# Patient Record
Sex: MALE | Race: WHITE | Employment: UNEMPLOYED | URBAN - METROPOLITAN AREA
[De-identification: names, ages, dates, MRNs, and addresses within clinical notes are randomized per-mention and may not be internally consistent; named-entity substitution may affect disease eponyms.]

---

## 2016-04-21 LAB
LEFT VENTRICULAR EJECTION FRACTION MODE: NORMAL
LV EF: 65 %

## 2017-02-23 PROBLEM — N18.30 CKD (CHRONIC KIDNEY DISEASE) STAGE 3, GFR 30-59 ML/MIN (HCC): Chronic | Status: ACTIVE | Noted: 2017-02-23

## 2017-06-01 PROBLEM — R76.8 POSITIVE HEPATITIS C ANTIBODY TEST: Chronic | Status: ACTIVE | Noted: 2017-06-01

## 2018-01-29 PROBLEM — N40.0 BPH (BENIGN PROSTATIC HYPERPLASIA): Status: ACTIVE | Noted: 2018-01-29

## 2018-02-26 PROBLEM — F32.A DEPRESSION: Status: ACTIVE | Noted: 2018-02-26

## 2018-03-12 ENCOUNTER — HOSPITAL ENCOUNTER (OUTPATIENT)
Dept: ULTRASOUND IMAGING | Age: 58
Discharge: HOME | End: 2018-03-14
Payer: MEDICARE

## 2018-03-12 DIAGNOSIS — N18.30 CKD (CHRONIC KIDNEY DISEASE) STAGE 3, GFR 30-59 ML/MIN (HCC): Chronic | ICD-10-CM

## 2018-03-12 DIAGNOSIS — E87.5 HYPERKALEMIA: ICD-10-CM

## 2018-03-12 PROCEDURE — 76770 US EXAM ABDO BACK WALL COMP: CPT

## 2018-04-02 ENCOUNTER — OFFICE VISIT (OUTPATIENT)
Dept: FAMILY MEDICINE CLINIC | Age: 58
End: 2018-04-02
Payer: MEDICARE

## 2018-04-02 ENCOUNTER — HOSPITAL ENCOUNTER (OUTPATIENT)
Age: 58
Discharge: HOME | End: 2018-04-04
Payer: MEDICARE

## 2018-04-02 VITALS
RESPIRATION RATE: 18 BRPM | HEIGHT: 72 IN | HEART RATE: 76 BPM | DIASTOLIC BLOOD PRESSURE: 79 MMHG | TEMPERATURE: 97.6 F | OXYGEN SATURATION: 93 % | SYSTOLIC BLOOD PRESSURE: 133 MMHG | BODY MASS INDEX: 42.66 KG/M2 | WEIGHT: 315 LBS

## 2018-04-02 DIAGNOSIS — E66.9 DIABETES MELLITUS TYPE 2 IN OBESE (HCC): ICD-10-CM

## 2018-04-02 DIAGNOSIS — Z76.0 MEDICATION REFILL: ICD-10-CM

## 2018-04-02 DIAGNOSIS — E13.65 OTHER SPECIFIED DIABETES MELLITUS WITH HYPERGLYCEMIA, WITHOUT LONG-TERM CURRENT USE OF INSULIN (HCC): ICD-10-CM

## 2018-04-02 DIAGNOSIS — I10 ESSENTIAL HYPERTENSION: Chronic | ICD-10-CM

## 2018-04-02 DIAGNOSIS — E11.69 DIABETES MELLITUS TYPE 2 IN OBESE (HCC): ICD-10-CM

## 2018-04-02 DIAGNOSIS — F32.A DEPRESSION, UNSPECIFIED DEPRESSION TYPE: ICD-10-CM

## 2018-04-02 DIAGNOSIS — I10 ESSENTIAL HYPERTENSION: Primary | Chronic | ICD-10-CM

## 2018-04-02 LAB
ANION GAP SERPL CALCULATED.3IONS-SCNC: 18 MMOL/L (ref 7–16)
BUN BLDV-MCNC: 22 MG/DL (ref 6–20)
CALCIUM SERPL-MCNC: 9 MG/DL (ref 8.6–10.2)
CHLORIDE BLD-SCNC: 95 MMOL/L (ref 98–107)
CO2: 26 MMOL/L (ref 22–29)
CREAT SERPL-MCNC: 1.3 MG/DL (ref 0.7–1.2)
GFR AFRICAN AMERICAN: >60
GFR NON-AFRICAN AMERICAN: 57 ML/MIN/1.73
GLUCOSE BLD-MCNC: 349 MG/DL (ref 74–109)
HBA1C MFR BLD: 8.8 % (ref 4.8–5.9)
POTASSIUM SERPL-SCNC: 4.4 MMOL/L (ref 3.5–5)
SODIUM BLD-SCNC: 139 MMOL/L (ref 132–146)

## 2018-04-02 PROCEDURE — 80048 BASIC METABOLIC PNL TOTAL CA: CPT

## 2018-04-02 PROCEDURE — 36415 COLL VENOUS BLD VENIPUNCTURE: CPT

## 2018-04-02 PROCEDURE — 99212 OFFICE O/P EST SF 10 MIN: CPT | Performed by: FAMILY MEDICINE

## 2018-04-02 PROCEDURE — 99213 OFFICE O/P EST LOW 20 MIN: CPT | Performed by: FAMILY MEDICINE

## 2018-04-02 PROCEDURE — 83036 HEMOGLOBIN GLYCOSYLATED A1C: CPT

## 2018-04-02 RX ORDER — LOSARTAN POTASSIUM 25 MG/1
25 TABLET ORAL DAILY
Qty: 30 TABLET | Refills: 3 | Status: SHIPPED | OUTPATIENT
Start: 2018-04-02 | End: 2018-08-07 | Stop reason: SDUPTHER

## 2018-04-02 RX ORDER — TRAZODONE HYDROCHLORIDE 100 MG/1
200 TABLET ORAL NIGHTLY
Qty: 60 TABLET | Refills: 5 | Status: SHIPPED | OUTPATIENT
Start: 2018-04-02 | End: 2018-06-13 | Stop reason: SDUPTHER

## 2018-04-02 RX ORDER — PAROXETINE HYDROCHLORIDE 20 MG/1
20 TABLET, FILM COATED ORAL DAILY
Qty: 30 TABLET | Refills: 3 | Status: SHIPPED | OUTPATIENT
Start: 2018-04-02 | End: 2018-08-07 | Stop reason: SDUPTHER

## 2018-04-02 ASSESSMENT — ENCOUNTER SYMPTOMS
NAUSEA: 0
CONSTIPATION: 0
VOMITING: 0
DIARRHEA: 0
ABDOMINAL PAIN: 0
SHORTNESS OF BREATH: 0
COUGH: 0

## 2018-04-05 ENCOUNTER — TELEPHONE (OUTPATIENT)
Dept: CASE MANAGEMENT | Age: 58
End: 2018-04-05

## 2018-05-01 ENCOUNTER — TELEPHONE (OUTPATIENT)
Dept: FAMILY MEDICINE CLINIC | Age: 58
End: 2018-05-01

## 2018-05-23 RX ORDER — GLIPIZIDE 10 MG/1
10 TABLET ORAL
Qty: 60 TABLET | Refills: 3 | Status: SHIPPED | OUTPATIENT
Start: 2018-05-23 | End: 2018-08-07 | Stop reason: SDUPTHER

## 2018-05-25 ENCOUNTER — HOSPITAL ENCOUNTER (OUTPATIENT)
Dept: CT IMAGING | Age: 58
Discharge: HOME | End: 2018-05-27
Payer: MEDICARE

## 2018-05-25 DIAGNOSIS — Z87.891 PERSONAL HISTORY OF TOBACCO USE: ICD-10-CM

## 2018-05-25 PROCEDURE — G0297 LDCT FOR LUNG CA SCREEN: HCPCS

## 2018-05-29 ENCOUNTER — TELEPHONE (OUTPATIENT)
Dept: CASE MANAGEMENT | Age: 58
End: 2018-05-29

## 2018-05-31 DIAGNOSIS — E66.9 DIABETES MELLITUS TYPE 2 IN OBESE (HCC): ICD-10-CM

## 2018-05-31 DIAGNOSIS — I25.2 HISTORY OF NON-ST ELEVATION MYOCARDIAL INFARCTION (NSTEMI): ICD-10-CM

## 2018-05-31 DIAGNOSIS — Z76.0 MEDICATION REFILL: ICD-10-CM

## 2018-05-31 DIAGNOSIS — E11.69 DIABETES MELLITUS TYPE 2 IN OBESE (HCC): ICD-10-CM

## 2018-05-31 DIAGNOSIS — I10 ESSENTIAL HYPERTENSION: ICD-10-CM

## 2018-05-31 DIAGNOSIS — E78.00 PURE HYPERCHOLESTEROLEMIA: Chronic | ICD-10-CM

## 2018-06-01 RX ORDER — ATENOLOL 50 MG/1
50 TABLET ORAL 2 TIMES DAILY
Qty: 180 TABLET | Refills: 1 | Status: SHIPPED | OUTPATIENT
Start: 2018-06-01 | End: 2018-11-12 | Stop reason: SDUPTHER

## 2018-06-01 RX ORDER — ATORVASTATIN CALCIUM 40 MG/1
TABLET, FILM COATED ORAL
Qty: 90 TABLET | Refills: 1 | Status: SHIPPED | OUTPATIENT
Start: 2018-06-01 | End: 2018-11-19 | Stop reason: SDUPTHER

## 2018-06-01 RX ORDER — HYDROCHLOROTHIAZIDE 25 MG/1
25 TABLET ORAL DAILY
Qty: 90 TABLET | Refills: 1 | Status: SHIPPED | OUTPATIENT
Start: 2018-06-01 | End: 2018-11-19 | Stop reason: SDUPTHER

## 2018-06-01 RX ORDER — FINASTERIDE 5 MG/1
5 TABLET, FILM COATED ORAL DAILY
Qty: 90 TABLET | Refills: 1 | Status: SHIPPED | OUTPATIENT
Start: 2018-06-01 | End: 2018-12-05 | Stop reason: SDUPTHER

## 2018-06-01 RX ORDER — ASPIRIN 81 MG/1
81 TABLET ORAL DAILY
Qty: 90 TABLET | Refills: 1 | Status: SHIPPED | OUTPATIENT
Start: 2018-06-01 | End: 2018-11-19 | Stop reason: SDUPTHER

## 2018-06-01 RX ORDER — AMLODIPINE BESYLATE 10 MG/1
10 TABLET ORAL DAILY
Qty: 90 TABLET | Refills: 1 | Status: SHIPPED | OUTPATIENT
Start: 2018-06-01 | End: 2018-11-12 | Stop reason: SDUPTHER

## 2018-06-01 RX ORDER — OMEPRAZOLE 20 MG/1
20 CAPSULE, DELAYED RELEASE ORAL DAILY
Qty: 90 CAPSULE | Refills: 1 | Status: SHIPPED | OUTPATIENT
Start: 2018-06-01 | End: 2018-11-19 | Stop reason: SDUPTHER

## 2018-06-13 DIAGNOSIS — Z76.0 MEDICATION REFILL: ICD-10-CM

## 2018-06-14 RX ORDER — DIPHENHYDRAMINE HCL 25 MG
75 CAPSULE ORAL NIGHTLY
Qty: 90 CAPSULE | Refills: 3 | Status: SHIPPED | OUTPATIENT
Start: 2018-06-14 | End: 2018-11-19 | Stop reason: SDUPTHER

## 2018-06-14 RX ORDER — TRAZODONE HYDROCHLORIDE 100 MG/1
200 TABLET ORAL NIGHTLY
Qty: 60 TABLET | Refills: 5 | Status: SHIPPED | OUTPATIENT
Start: 2018-06-14 | End: 2018-08-07 | Stop reason: SDUPTHER

## 2018-08-07 ENCOUNTER — OFFICE VISIT (OUTPATIENT)
Dept: FAMILY MEDICINE CLINIC | Age: 58
End: 2018-08-07
Payer: MEDICARE

## 2018-08-07 VITALS
HEART RATE: 81 BPM | RESPIRATION RATE: 18 BRPM | TEMPERATURE: 98 F | HEIGHT: 72 IN | SYSTOLIC BLOOD PRESSURE: 133 MMHG | WEIGHT: 315 LBS | BODY MASS INDEX: 42.66 KG/M2 | OXYGEN SATURATION: 92 % | DIASTOLIC BLOOD PRESSURE: 87 MMHG

## 2018-08-07 DIAGNOSIS — E66.9 DIABETES MELLITUS TYPE 2 IN OBESE (HCC): ICD-10-CM

## 2018-08-07 DIAGNOSIS — E11.69 DIABETES MELLITUS TYPE 2 IN OBESE (HCC): ICD-10-CM

## 2018-08-07 DIAGNOSIS — G56.00 CARPAL TUNNEL SYNDROME, UNSPECIFIED LATERALITY: Primary | ICD-10-CM

## 2018-08-07 DIAGNOSIS — F32.A DEPRESSION, UNSPECIFIED DEPRESSION TYPE: ICD-10-CM

## 2018-08-07 DIAGNOSIS — I10 ESSENTIAL HYPERTENSION: Chronic | ICD-10-CM

## 2018-08-07 DIAGNOSIS — Z76.0 MEDICATION REFILL: ICD-10-CM

## 2018-08-07 LAB — HBA1C MFR BLD: 9.9 %

## 2018-08-07 PROCEDURE — 83036 HEMOGLOBIN GLYCOSYLATED A1C: CPT | Performed by: STUDENT IN AN ORGANIZED HEALTH CARE EDUCATION/TRAINING PROGRAM

## 2018-08-07 PROCEDURE — 99212 OFFICE O/P EST SF 10 MIN: CPT | Performed by: STUDENT IN AN ORGANIZED HEALTH CARE EDUCATION/TRAINING PROGRAM

## 2018-08-07 PROCEDURE — 99213 OFFICE O/P EST LOW 20 MIN: CPT | Performed by: STUDENT IN AN ORGANIZED HEALTH CARE EDUCATION/TRAINING PROGRAM

## 2018-08-07 RX ORDER — LOSARTAN POTASSIUM 25 MG/1
25 TABLET ORAL DAILY
Qty: 30 TABLET | Refills: 3 | Status: SHIPPED | OUTPATIENT
Start: 2018-08-07 | End: 2018-11-19 | Stop reason: SDUPTHER

## 2018-08-07 RX ORDER — TRAZODONE HYDROCHLORIDE 100 MG/1
200 TABLET ORAL NIGHTLY
Qty: 60 TABLET | Refills: 5 | Status: SHIPPED | OUTPATIENT
Start: 2018-08-07 | End: 2019-01-03 | Stop reason: SDUPTHER

## 2018-08-07 RX ORDER — GLIPIZIDE 10 MG/1
10 TABLET ORAL
Qty: 90 TABLET | Refills: 3 | Status: SHIPPED | OUTPATIENT
Start: 2018-08-07 | End: 2018-11-19 | Stop reason: SDUPTHER

## 2018-08-07 RX ORDER — MIRTAZAPINE 15 MG/1
15 TABLET, FILM COATED ORAL NIGHTLY
Qty: 30 TABLET | Refills: 3 | Status: SHIPPED | OUTPATIENT
Start: 2018-08-07 | End: 2018-11-12 | Stop reason: SDUPTHER

## 2018-08-07 RX ORDER — PAROXETINE HYDROCHLORIDE 20 MG/1
20 TABLET, FILM COATED ORAL DAILY
Qty: 30 TABLET | Refills: 3 | Status: SHIPPED | OUTPATIENT
Start: 2018-08-07 | End: 2018-11-19 | Stop reason: SDUPTHER

## 2018-08-07 ASSESSMENT — ENCOUNTER SYMPTOMS
ABDOMINAL PAIN: 0
NAUSEA: 0
VOMITING: 0

## 2018-08-07 NOTE — PROGRESS NOTES
work.  States that he is also taking Benadryl occasionally. Patient with COPD currently on 4-6 mLs of O2. Currently well controlled, and recently had medications changed by pulmonology which includes stopping Qvar and starting trelegy. He uses BiPAP at night for sleep apnea. [place] [facility] [place] carpal tunnel and back pain, as it would be free. Past Medical History:      Diagnosis Date    Anxiety     Asthma     CAD (coronary artery disease) 2008    2 stents in 2008 .  CHF [facility]congestive heart failure) ([facility])     Chills     Chronic sinusitis     Cocaine abuse     COPD (chronic obstructive pulmonary disease) ([facility])     Depression     Diabetes mellitus ([facility])     on insulin    GERD (gastroesophageal reflux disease)     H/O cardiovascular stress test     H/O chest x-ray     Hand fracture 3/2010    Fractured both hands.  Hepatitis C     Work up was negative. Saw specialist     History of tobacco abuse     Hyperlipidemia     Hypertension     Night sweats     NSTEMI (non-ST elevation myocardial infarction) ([facility] [address] 3/2010    Hospitalized.  Obesity     Oxygen dependent     Panic attacks     Pneumonia 3/2010     LLL.  Sleep apnea     Has CPAP machine    SOB (shortness of breath)     does not know settings       Past Surgical History:        Procedure Laterality Date    COLONOSCOPY  2011    COLONOSCOPY  9/23/15   [employer] [facility] CATH LAB PROCEDURE  2/25/2009    [facility], cardiac cath/primary BM stent in proximal LAD.     OTHER SURGICAL HISTORY      anal fistula    TONSILLECTOMY         Allergies:    Amoxicillin and Penicillins    Social History:   Social History     Social History    Marital status: Single     Spouse name: N/A    Number of children: N/A    Years of education: 12     Occupational History    disabled-      Social History Main Topics    Smoking status: Former Smoker     Packs/day: 2.00     Years: 38.00 with Dr. Jasmyn Lozada

## 2018-08-07 NOTE — PROGRESS NOTES
S: 62 y.o. male here for md follow up. Eating a lot of sugary foods. Cannot afford test strips. Trouble with sleeping. Taking trazodone at night and self increased to 300 mg. Uses cpap for ALEXIS.  O: VS: /87   Pulse 81   Temp 98 °F (36.7 °C)   Resp 18   Ht 6' (1.829 m)   Wt (!) 372 lb (168.7 kg)   SpO2 92%   BMI 50.45 kg/m²    General: NAD   CV:  RRR, no gallops, rubs, or murmurs   Resp: CTAB no R/R/W   Abd:  Soft, nontender, no masses    Ext:  no C/C/tr edema b/l legs  Impression/Plan:   1. Dm 2-check a1c. Last one was 8.8. Did see eye doctor recently. Increase glypizide  2. HTN-controlled  3. Insomnia- continue trazodone 200mg , sleep hygiene, discuss adjusting mdd meds next time, low dose remeron, Watch for signs of elevated serotonin    rto in 4 weeks      Attending Physician Statement  I have discussed the case, including pertinent history and exam findings with the resident. I agree with the documented assessment and plan.         Bety Danielle MD

## 2018-08-08 RX ORDER — LIDOCAINE 50 MG/G
1 PATCH TOPICAL DAILY
Qty: 30 PATCH | Refills: 1 | Status: SHIPPED | OUTPATIENT
Start: 2018-08-08 | End: 2018-10-02 | Stop reason: SDUPTHER

## 2018-08-22 ENCOUNTER — TELEPHONE (OUTPATIENT)
Dept: FAMILY MEDICINE CLINIC | Age: 58
End: 2018-08-22

## 2018-08-22 NOTE — TELEPHONE ENCOUNTER
Patient called in stating that he would like a letter stating that he is unable to do jury duty due to being on oxygen 24 hours a day. Letter can be faxed to  [phone].

## 2018-08-27 NOTE — TELEPHONE ENCOUNTER
Patient notified of appointment information per Dr. Janie Yao note, patient verbalized and understood.

## 2018-09-18 ENCOUNTER — OFFICE VISIT (OUTPATIENT)
Dept: FAMILY MEDICINE CLINIC | Age: 58
End: 2018-09-18
Payer: MEDICARE

## 2018-09-18 VITALS
BODY MASS INDEX: 42.66 KG/M2 | TEMPERATURE: 98.6 F | HEIGHT: 72 IN | WEIGHT: 315 LBS | OXYGEN SATURATION: 95 % | DIASTOLIC BLOOD PRESSURE: 78 MMHG | SYSTOLIC BLOOD PRESSURE: 138 MMHG | HEART RATE: 83 BPM | RESPIRATION RATE: 22 BRPM

## 2018-09-18 DIAGNOSIS — E11.69 DIABETES MELLITUS TYPE 2 IN OBESE (HCC): ICD-10-CM

## 2018-09-18 DIAGNOSIS — G47.00 INSOMNIA, UNSPECIFIED TYPE: Primary | ICD-10-CM

## 2018-09-18 DIAGNOSIS — R35.0 URINARY FREQUENCY: ICD-10-CM

## 2018-09-18 DIAGNOSIS — E66.9 DIABETES MELLITUS TYPE 2 IN OBESE (HCC): ICD-10-CM

## 2018-09-18 PROCEDURE — 99212 OFFICE O/P EST SF 10 MIN: CPT | Performed by: STUDENT IN AN ORGANIZED HEALTH CARE EDUCATION/TRAINING PROGRAM

## 2018-09-18 PROCEDURE — 99213 OFFICE O/P EST LOW 20 MIN: CPT | Performed by: STUDENT IN AN ORGANIZED HEALTH CARE EDUCATION/TRAINING PROGRAM

## 2018-09-18 NOTE — PROGRESS NOTES
[address] [facility]  Department of Family Medicine  Family Medicine Residency Program      Patient:  Benjamin Baird 62 y.o. male     Date of Service: 9/18/18      Chief complaint:   Chief Complaint   Patient presents with    Diabetes     follow-up    Insomnia    Other     Increased urination         History of Present Illness   The patient is a 62 y.o. male  presented to the clinic with PMHx of GERD, Insomnia, anxiety, and COPD who presents for f/u of Insomnia, and complaint of urinary frequency. Insomnia slightly better. We added Remeron during his last visit to the clinic, and is helping. Still taking 2- 3 trazodones, 4 benadryls, in addition to remeron. Wakes up in the middle of the nght and takes another trazodone to fall back asleep States that he is not taking anymore [employer] sleep aid. Has tried Melatonin in the past but did not work. States that xanax, and Nora Valverde helped in the past. Which he was getting from psychiatry, but he cant afford to see psychiatry anymore as it costs him $40 to see a specialist.  Robin Murdock Dr,. Sridevi Thomas- 40$ in [place]ax will want to see also on magi    Also on Paxil, and states that he is ok nnow but tends to get more down during the winter. Urinary frequency:-He follows with urologist, and is on flomax, and finasteride. Last PSA was normal. Increased urinating is  only at night. Drinking lots of water. During the. Takes HCTZ in the morning. States that he urinates  before going to bed. Due to the urinary frequency at night it makes his sleeping worse. Denies dysuria, change in urine color, or change in smell. Cardiologist-  Minus Farida Gonzalez smoking 10 years ago. Has COPD, and ALEXIS. Past Medical History:      Diagnosis Date    Anxiety     Asthma     CAD (coronary artery disease) 2008    2 stents in 2008 .     [facility]     Chills     Chronic sinusitis     Cocaine abuse     COPD [facility]ch[facility] obstructive pulmonary disease) ([facility] [address]     Depression     [facility]     on insulin    GERD (gastroesophageal reflux disease)     H/O cardiovascular stress test     H/O chest x-ray     Hand fracture 3/2010    Fractured both hands.  Hepatitis C     Work up was negative. Saw specialist     History of tobacco abuse     Hyperlipidemia     Hypertension     Night sweats     [facility]STEM[facility]non-ST elevation [facility]) [facility] 3[facility]2010    Hospitalized.  Obesity     Oxygen dependent     Panic attacks     Pneumonia 3/2010     LLL.  Sleep apnea     Has CPAP machine    SOB (shortness of breath)     does not know settings       Past Surgical History:        Procedure Laterality Date    COLONOSCOPY  2011    COLONOSCOPY  9/23/15   [facility] DI[facility]NO[facility]IC C[facility]AC CATH LAB PROCEDURE  2/25/2009    [facility], cardiac cath/primary BM stent in proximal LAD.  OTHER SURGICAL HISTORY      anal fistula    TONSILLECTOMY         Allergies:    Amoxicillin and Penicillins    Social History:   Social History     Social History    Marital status: Single     Spouse name: N/A    Number of children: N/A    Years of education: 15     Occupational History    disabled-      Social History Main Topics    Smoking status: Former Smoker     Packs/day: 2.00     Years: 38.00     Types: Cigarettes     Quit date: 8/5/2008    Smokeless tobacco: Never Used    Alcohol use No      Comment: was heavy drinker; quit age 35;  drinks 1-2 cups of coffee daily    Drug use: No      Comment: Acid, marijuana, cocaine, STOPPED ALL DRUGS 1993    Sexual activity: Not on file     Other Topics Concern    Not on file     Social History Narrative    No narrative on file        Family History:   Family History   Problem Relation Age of Onset    Heart Failure Father     Diabetes Mother     Heart Surgery Brother        Review of Systems:   Review of Systems   Constitutional: Negative for fever.    Respiratory: Negative for shortness of breath. On 4L O2   Cardiovascular: Negative for chest pain. Genitourinary:        As stated in HPI   Psychiatric/Behavioral:        As stated in HPI       Physical Exam   Vitals: /78   Pulse 83   Temp 98.6 °F (37 °C) (Oral)   Resp 22   Ht 6' (1.829 m)   Wt (!) 370 lb (167.8 kg)   SpO2 95% Comment: 4 l O2  BMI 50.18 kg/m²   General Appearance: Well developed, awake, alert, oriented, no acute distress. Obese. HEENT: Normocephalic, atraumatic. Chest wall/Lung: Clear to auscultation bilaterally,  respirations unlabored. No ronchi/wheezing/rales. On 4L O2  Heart: Regular rate and rhythm, S1 and S2 normal, no murmur, rub or gallop. Abdomen: Soft, non-tender, bowel sounds normoactive, no masses, no organomegaly  Extremities:  Trace edema on lower left leg (chronic, and better than usual)  Neurologic:   Alert&Oriented. Psychiatric: has a normal mood and affect. Behavior is normal.       Assessment and Plan       1. Diabetes mellitus type 2 in obese (HCC)  -Last A1c was 8.8. Currently on Metformin maxed out, and Glipizide TID.  - Offered patient Trulicity or sitaglipitin but he is unable to afford them as they would cost about $40, so will talk to SW    Talk to  for Psych, and DM meds-Trulicity or sitagliptin    2. Insomnia, unspecified type  -Still having trouble staying asleep. He continues to take Benadryl, Trazodone, and now the remeron I prescribed which he thinks is helping.  -Discussed sleep hygiene again. We can doxepin in the future. Advised to decrease Benadryl, and to only take 200mg of Trazodone  -Control DM which will decrease urinary frequency, which will decrease night time awakenings. -Xanax and Ambien helped in the past, but he can't afford to see Psychiatry.  -Will talk to SW to see if able to help him co-pay for Psychiatrist  -Use CPAP for ALEXIS    3.  Urinary frequency  -Increased urinary frequency at night,which is likely due to uncontrolled diabetes.  -Plan as stated above In the diabetes section      Additional plans and future considerations weight loss    Return to Office: No Follow-up on file. Medication List:    Current Outpatient Prescriptions   Medication Sig Dispense Refill    lidocaine (LIDODERM) 5 % Place 1 patch onto the skin daily 12 hours on, 12 hours off.  30 patch 1    PARoxetine (PAXIL) 20 MG tablet Take 1 tablet by mouth daily 30 tablet 3    metFORMIN (GLUCOPHAGE) 1000 MG tablet Take 1 tablet by mouth 2 times daily (with meals) 180 tablet 1    losartan (COZAAR) 25 MG tablet Take 1 tablet by mouth daily 30 tablet 3    glipiZIDE (GLUCOTROL) 10 MG tablet Take 1 tablet by mouth 3 times daily (before meals) (Patient taking differently: Take 10 mg by mouth 2 times daily (before meals) ) 90 tablet 3    mirtazapine (REMERON) 15 MG tablet Take 1 tablet by mouth nightly 30 tablet 3    traZODone (DESYREL) 100 MG tablet Take 2 tablets by mouth nightly 60 tablet 5    tamsulosin (FLOMAX) 0.4 MG capsule take 1 capsule by mouth once daily 90 capsule 1    diphenhydrAMINE (BENADRYL) 25 MG capsule Take 3 capsules by mouth nightly 90 capsule 3    Fluticasone-Umeclidin-Vilant 100-62.5-25 MCG/INH AEPB Inhale 1 puff into the lungs daily 1 each 5    amLODIPine (NORVASC) 10 MG tablet Take 1 tablet by mouth daily 90 tablet 1    atenolol (TENORMIN) 50 MG tablet Take 1 tablet by mouth 2 times daily 180 tablet 1    aspirin 81 MG EC tablet Take 1 tablet by mouth daily 90 tablet 1    omeprazole (PRILOSEC) 20 MG delayed release capsule Take 1 capsule by mouth daily 90 capsule 1    atorvastatin (LIPITOR) 40 MG tablet take 1 tablet by mouth once daily 90 tablet 1    finasteride (PROSCAR) 5 MG tablet Take 1 tablet by mouth daily 90 tablet 1    albuterol (PROVENTIL) (2.5 MG/3ML) 0.083% nebulizer solution inhale contents of 1 vial in nebulizer four times a day 120 each 5    glucose blood VI test strips (ASCENSIA AUTODISC VI;ONE TOUCH ULTRA TEST VI)

## 2018-09-25 ASSESSMENT — ENCOUNTER SYMPTOMS: SHORTNESS OF BREATH: 0

## 2018-10-12 ENCOUNTER — OFFICE VISIT (OUTPATIENT)
Dept: CARDIOLOGY CLINIC | Age: 58
End: 2018-10-12
Payer: MEDICARE

## 2018-10-12 VITALS
SYSTOLIC BLOOD PRESSURE: 138 MMHG | HEART RATE: 83 BPM | RESPIRATION RATE: 16 BRPM | HEIGHT: 72 IN | BODY MASS INDEX: 42.66 KG/M2 | DIASTOLIC BLOOD PRESSURE: 78 MMHG | WEIGHT: 315 LBS

## 2018-10-12 DIAGNOSIS — E11.69 DIABETES MELLITUS TYPE 2 IN OBESE (HCC): ICD-10-CM

## 2018-10-12 DIAGNOSIS — E78.00 PURE HYPERCHOLESTEROLEMIA: Chronic | ICD-10-CM

## 2018-10-12 DIAGNOSIS — E66.9 DIABETES MELLITUS TYPE 2 IN OBESE (HCC): ICD-10-CM

## 2018-10-12 DIAGNOSIS — I25.119 CORONARY ARTERY DISEASE INVOLVING NATIVE CORONARY ARTERY OF NATIVE HEART WITH ANGINA PECTORIS (HCC): Primary | ICD-10-CM

## 2018-10-12 DIAGNOSIS — I25.2 HISTORY OF NON-ST ELEVATION MYOCARDIAL INFARCTION (NSTEMI): ICD-10-CM

## 2018-10-12 DIAGNOSIS — I10 ESSENTIAL HYPERTENSION: Chronic | ICD-10-CM

## 2018-10-12 PROCEDURE — 93000 ELECTROCARDIOGRAM COMPLETE: CPT | Performed by: INTERNAL MEDICINE

## 2018-10-12 PROCEDURE — 99213 OFFICE O/P EST LOW 20 MIN: CPT | Performed by: INTERNAL MEDICINE

## 2018-10-16 NOTE — PROGRESS NOTES
[facility]  MD Alyce Villarreal MD Verita Handler, MD Mercedes Chacon. MD Denise Ojeda Part   1960  Vania Henderson MD    Mr. Pearl Chaudhry was in the outpatient office on 10/12/2018 for follow up of his CAD. This morbidly obese, 22-year-old gentleman has CO@ dependent COPD with PCI to his LAD in 2009, hypertension and diabetes. This last echo in 04/2016 revealed normal EF. He presents to our office today for a yearly follow up visit. He notes no new cardiac complaints. He has chronic dyspnea on exertion, which has remained fairly unchanged. He denies orthopnea, PND and no lower extremity edema. 1. Morbid obesity, BMI 52 - 04/13/2015. 2. Hypertension. 3. Diabetes. 4. 02-dependent COPD. 5. GERD. 6. PCI to proximal LAD, 02/25/2009.   7. Echo, 02/24/2014. EF 50-55%. Stage II diastolic dysfunction. Normal LA size. No valvular heart disease. 8. Allergies to amoxicillin and penicillin. 9. Sixty pack year smoking history. Quit 08/05/2008. 10. Presentation, [facility], 1/3/2016 with throat pain. No chest pain was noted. Enzymes negative x 3. Creatinine 1.4. 11. Stress perfusion, 1/4/2016, mid and distal inferior wall infarct with no ischemia. Inferior wall hypokinesis. EF 42%. 12. Cardiology consulted. Due to lack of anginal symptoms, no further cardiac testing was recommended. 13. Echo 04/21/2016. Technically difficult study. Definity contrast used. EF 65%. Normal LA size.   Valves could not be visualized in the study.       Review of Systems:  HEENT: negative for acute visual and auditory problems  Constitutional: negative for fever and chills  Respiratory: negative for cough and hemoptysis  Cardiovascular: negative for chest pain and dyspnea  Gastrointestinal: negative for abdominal pain, diarrhea, nausea and vomiting  Genitourinary: negative for dysuria and hematuria  Derm: negative for rash and skin lesion(s)  Neurological: negative for seizures and tremors  Endocrine: negative for diabetic symptoms including polydipsia and polyuria  Musculoskeletal: negative for CTD  Psychiatric: negative for anxiety and major depression    On exam, he is a middle-aged, obese gentleman in no particular distress. BP: 138/78. P: 83. R: 16. Wt. 368 lbs. BMI: 49. HEENT, conjunctiva pink. Sclerae anicteric. Mucous membranes moist.  Neck is short and thick. No JVD could be appreciated. Carotid upstrokes normal.  No bruits. Chest expands normally. No intercostal retractions. Cardiovascular exam, normal S1/S2, regular rate and rhythm. No murmurs, rubs or gallops. Lungs have severely diminished breath sounds all over. No creps, rhonchi or wheezes. Abdomen is morbidly obese, soft and nontender. Extremities are without edema. Distal pulses are normal bilaterally. Skin has no rashes. Neurologically, oriented x 3. Psych, he is pleasant. Back, no tenderness. Muscle tone is normal.      EKG, as per my interpretation, reveals sinus rhythm, right bundle branch block. Nonspecific ST-T changes. Mr. Therese Ly was in the outpatient office for follow up of his CAD. He notes no anginal complaints. He has chronic dyspnea on exertion. It is related to his COPD. He requires no further cardiac testing. His medical therapy will be continued as follows:   lidocaine (LIDODERM) 5 % Place 1 patch onto the skin daily 12 hours on, 12 hours off.   capsicum (ZOSTRIX) 0.075 % topical cream Apply topically 3 times daily.    PARoxetine (PAXIL) 20 MG tablet Take 1 tablet by mouth daily   metFORMIN (GLUCOPHAGE) 1000 MG tablet Take 1 tablet by mouth 2 times daily (with meals)   losartan (COZAAR) 25 MG tablet Take 1 tablet by mouth daily   glipiZIDE (GLUCOTROL) 10 MG tablet Take 1 tablet by mouth 3 times daily (before meals)   mirtazapine (REMERON) 15 MG tablet Take 1 tablet by mouth nightly   traZODone (DESYREL) 100 MG tablet Take 2 tablets by mouth nightly   tamsulosin

## 2018-11-12 DIAGNOSIS — Z76.0 MEDICATION REFILL: ICD-10-CM

## 2018-11-12 DIAGNOSIS — F32.A DEPRESSION, UNSPECIFIED DEPRESSION TYPE: ICD-10-CM

## 2018-11-12 DIAGNOSIS — I10 ESSENTIAL HYPERTENSION: ICD-10-CM

## 2018-11-12 RX ORDER — MIRTAZAPINE 15 MG/1
TABLET, FILM COATED ORAL
Qty: 30 TABLET | Refills: 3 | Status: SHIPPED | OUTPATIENT
Start: 2018-11-12 | End: 2019-01-03 | Stop reason: SDUPTHER

## 2018-11-12 RX ORDER — AMLODIPINE BESYLATE 10 MG/1
TABLET ORAL
Qty: 90 TABLET | Refills: 1 | Status: SHIPPED | OUTPATIENT
Start: 2018-11-12 | End: 2019-02-08 | Stop reason: SDUPTHER

## 2018-11-12 RX ORDER — ATENOLOL 50 MG/1
TABLET ORAL
Qty: 180 TABLET | Refills: 1 | Status: SHIPPED | OUTPATIENT
Start: 2018-11-12 | End: 2019-02-08 | Stop reason: SDUPTHER

## 2018-11-19 ENCOUNTER — HOSPITAL ENCOUNTER (OUTPATIENT)
Age: 58
Discharge: HOME | End: 2018-11-21
Payer: MEDICARE

## 2018-11-19 ENCOUNTER — OFFICE VISIT (OUTPATIENT)
Dept: FAMILY MEDICINE CLINIC | Age: 58
End: 2018-11-19
Payer: MEDICARE

## 2018-11-19 VITALS
WEIGHT: 315 LBS | TEMPERATURE: 98.4 F | OXYGEN SATURATION: 94 % | HEART RATE: 81 BPM | BODY MASS INDEX: 42.66 KG/M2 | SYSTOLIC BLOOD PRESSURE: 119 MMHG | HEIGHT: 72 IN | DIASTOLIC BLOOD PRESSURE: 77 MMHG

## 2018-11-19 DIAGNOSIS — E11.69 DIABETES MELLITUS TYPE 2 IN OBESE (HCC): ICD-10-CM

## 2018-11-19 DIAGNOSIS — I25.2 HISTORY OF NON-ST ELEVATION MYOCARDIAL INFARCTION (NSTEMI): ICD-10-CM

## 2018-11-19 DIAGNOSIS — I10 ESSENTIAL HYPERTENSION: Chronic | ICD-10-CM

## 2018-11-19 DIAGNOSIS — E78.00 PURE HYPERCHOLESTEROLEMIA: Chronic | ICD-10-CM

## 2018-11-19 DIAGNOSIS — K21.9 GASTROESOPHAGEAL REFLUX DISEASE, ESOPHAGITIS PRESENCE NOT SPECIFIED: Primary | ICD-10-CM

## 2018-11-19 DIAGNOSIS — E66.9 DIABETES MELLITUS TYPE 2 IN OBESE (HCC): ICD-10-CM

## 2018-11-19 DIAGNOSIS — F32.A DEPRESSION, UNSPECIFIED DEPRESSION TYPE: ICD-10-CM

## 2018-11-19 DIAGNOSIS — Z76.0 MEDICATION REFILL: ICD-10-CM

## 2018-11-19 DIAGNOSIS — J30.9 ALLERGIC RHINITIS, UNSPECIFIED SEASONALITY, UNSPECIFIED TRIGGER: ICD-10-CM

## 2018-11-19 LAB
ALBUMIN SERPL-MCNC: 4.1 G/DL (ref 3.5–5.2)
ALP BLD-CCNC: 89 U/L (ref 40–129)
ALT SERPL-CCNC: 20 U/L (ref 0–40)
ANION GAP SERPL CALCULATED.3IONS-SCNC: 16 MMOL/L (ref 7–16)
AST SERPL-CCNC: 16 U/L (ref 0–39)
BILIRUB SERPL-MCNC: 0.2 MG/DL (ref 0–1.2)
BUN BLDV-MCNC: 21 MG/DL (ref 6–20)
CALCIUM SERPL-MCNC: 9.3 MG/DL (ref 8.6–10.2)
CHLORIDE BLD-SCNC: 94 MMOL/L (ref 98–107)
CO2: 25 MMOL/L (ref 22–29)
CREAT SERPL-MCNC: 1.4 MG/DL (ref 0.7–1.2)
GFR AFRICAN AMERICAN: >60
GFR NON-AFRICAN AMERICAN: 52 ML/MIN/1.73
GLUCOSE BLD-MCNC: 496 MG/DL (ref 74–99)
HBA1C MFR BLD: 12.1 %
POTASSIUM SERPL-SCNC: 4.3 MMOL/L (ref 3.5–5)
SODIUM BLD-SCNC: 135 MMOL/L (ref 132–146)
TOTAL PROTEIN: 7.2 G/DL (ref 6.4–8.3)

## 2018-11-19 PROCEDURE — 80053 COMPREHEN METABOLIC PANEL: CPT

## 2018-11-19 PROCEDURE — 99212 OFFICE O/P EST SF 10 MIN: CPT | Performed by: STUDENT IN AN ORGANIZED HEALTH CARE EDUCATION/TRAINING PROGRAM

## 2018-11-19 PROCEDURE — 83036 HEMOGLOBIN GLYCOSYLATED A1C: CPT | Performed by: STUDENT IN AN ORGANIZED HEALTH CARE EDUCATION/TRAINING PROGRAM

## 2018-11-19 PROCEDURE — 99213 OFFICE O/P EST LOW 20 MIN: CPT | Performed by: STUDENT IN AN ORGANIZED HEALTH CARE EDUCATION/TRAINING PROGRAM

## 2018-11-19 RX ORDER — DIPHENHYDRAMINE HCL 25 MG
75 CAPSULE ORAL NIGHTLY
Qty: 90 CAPSULE | Refills: 3 | Status: SHIPPED | OUTPATIENT
Start: 2018-11-19 | End: 2019-02-08 | Stop reason: SDUPTHER

## 2018-11-19 RX ORDER — GLUCOSAMINE HCL/CHONDROITIN SU 500-400 MG
CAPSULE ORAL
Qty: 100 STRIP | Refills: 1 | Status: SHIPPED
Start: 2018-11-19 | End: 2020-07-20

## 2018-11-19 RX ORDER — LOSARTAN POTASSIUM 25 MG/1
25 TABLET ORAL DAILY
Qty: 30 TABLET | Refills: 3 | Status: SHIPPED | OUTPATIENT
Start: 2018-11-19 | End: 2019-01-03 | Stop reason: SDUPTHER

## 2018-11-19 RX ORDER — CALCIPOTRIENE 50 UG/G
CREAM TOPICAL
Qty: 360 G | Refills: 1 | Status: SHIPPED | OUTPATIENT
Start: 2018-11-19 | End: 2019-05-28 | Stop reason: SDUPTHER

## 2018-11-19 RX ORDER — OMEPRAZOLE 20 MG/1
20 CAPSULE, DELAYED RELEASE ORAL DAILY
Qty: 90 CAPSULE | Refills: 1 | Status: SHIPPED | OUTPATIENT
Start: 2018-11-19 | End: 2019-02-08 | Stop reason: SDUPTHER

## 2018-11-19 RX ORDER — GLIPIZIDE 10 MG/1
10 TABLET ORAL
Qty: 90 TABLET | Refills: 3 | Status: SHIPPED | OUTPATIENT
Start: 2018-11-19 | End: 2019-05-17

## 2018-11-19 RX ORDER — HYDROCHLOROTHIAZIDE 25 MG/1
25 TABLET ORAL DAILY
Qty: 90 TABLET | Refills: 1 | Status: SHIPPED | OUTPATIENT
Start: 2018-11-19 | End: 2019-02-08 | Stop reason: SDUPTHER

## 2018-11-19 RX ORDER — ATORVASTATIN CALCIUM 40 MG/1
TABLET, FILM COATED ORAL
Qty: 90 TABLET | Refills: 1 | Status: SHIPPED | OUTPATIENT
Start: 2018-11-19 | End: 2019-02-08 | Stop reason: SDUPTHER

## 2018-11-19 RX ORDER — ASPIRIN 81 MG/1
81 TABLET ORAL DAILY
Qty: 90 TABLET | Refills: 1 | Status: SHIPPED | OUTPATIENT
Start: 2018-11-19 | End: 2019-02-08 | Stop reason: SDUPTHER

## 2018-11-19 RX ORDER — PAROXETINE HYDROCHLORIDE 20 MG/1
20 TABLET, FILM COATED ORAL DAILY
Qty: 30 TABLET | Refills: 3 | Status: SHIPPED | OUTPATIENT
Start: 2018-11-19 | End: 2019-01-03 | Stop reason: SDUPTHER

## 2018-11-19 ASSESSMENT — ENCOUNTER SYMPTOMS
SHORTNESS OF BREATH: 0
RESPIRATORY NEGATIVE: 1

## 2018-11-19 NOTE — PROGRESS NOTES
[address]   Department of Family Medicine  Family Medicine Residency Program      Patient:  Chon Gamboa 62 y.o. male     Date of Service: 18      Chief complaint:   Chief Complaint   Patient presents with    Other     two month f/u         History of Present Illness   The patient is a 62 y.o. male  presented to the clinic with complaints as above. who presented to the clinic today for diabetes follow-up visit. .   Takes metformin, and glipizide BID cause only eating twice  Compliance with medication: Yes  Home blood glucose monitoring range: No, cant afford strips. Hypoglycemic episode(s): No  Statin: Yes  ACEI: Lisinopril stoped but on losrtan  ASA: Yes  Ophthalmology: Yes saw in  [place] for now  Podiatry: No  Dentistry: No, has no teeth  Compliance with diet:  No, he eats reeses cups, craccker, bread, pasta, no pop. Has seen dietician in the past.  States he has seen a nutritionist in the past. Eats two meals a day, cause of the time he wakes up. Other: States her mother that his mother  of a diabetic coma. States he is using Lantus in the past, but stopped because his A1c normalized.       Insomnia-Using BIPAP. Remneron helping, Still continues to take trazodone and Benadryl. Uses oxygen, states he uses 2 L when sitting around, and uses 3-4 L when walking. Already received his flu shot. Plan   Talk to  for strips, he has accucheck      PastMedical History:      Diagnosis Date    Anxiety     Asthma     CAD (coronary artery disease)     2 stents in  .  [facility]     Chills     Chronic sinusitis     [facility] ([facility] [address]     COPD (chronic obstructive pulmonary disease) (HCC)     Depression     Diabetes mellitus (HCC)     on insulin    GERD (gastroesophageal reflux disease)     H/O cardiovascular stress test     H/O chest x-ray     Hand fracture 3/2010    Fractured both hands.       Hepatitis C     Work up was by mouth nightly 60 tablet 5    diphenhydrAMINE (BENADRYL) 25 MG capsule Take 3 capsules by mouth nightly 90 capsule 3    Fluticasone-Umeclidin-Vilant 100-62.5-25 MCG/INH AEPB Inhale 1 puff into the lungs daily 1 each 5    aspirin 81 MG EC tablet Take 1 tablet by mouth daily 90 tablet 1    hydrochlorothiazide (HYDRODIURIL) 25 MG tablet Take 1 tablet by mouth daily 90 tablet 1    omeprazole (PRILOSEC) 20 MG delayed release capsule Take 1 capsule by mouth daily 90 capsule 1    atorvastatin (LIPITOR) 40 MG tablet take 1 tablet by mouth once daily 90 tablet 1    finasteride (PROSCAR) 5 MG tablet Take 1 tablet by mouth daily 90 tablet 1    albuterol (PROVENTIL) (2.5 MG/3ML) 0.083% nebulizer solution inhale contents of 1 vial in nebulizer four times a day 120 each 5    Omega-3 Fatty Acids (FISH OIL) 1000 MG CAPS Take 1,000 mg by mouth daily.  promethazine (PHENERGAN) 25 MG tablet Take 25 mg by mouth every 6 hours as needed for Nausea.  OXYGEN Inhale 6 L into the lungs continuous       Ipratropium-Albuterol (COMBIVENT IN) Inhale 2 puffs into the lungs 2 times daily       lidocaine (LIDODERM) 5 % Place 1 patch onto the skin daily 12 hours on, 12 hours off. (Patient taking differently: Place 1 patch onto the skin daily 12 hours on, 12 hours off. Patient has not received king 10/12/18) 30 patch 1    tamsulosin (FLOMAX) 0.4 MG capsule take 1 capsule by mouth once daily 90 capsule 1    docusate sodium (DOCQLACE) 100 MG capsule Take 1 capsule by mouth 2 times daily (Patient taking differently: Take 100 mg by mouth as needed ) 60 capsule 5    glucose blood VI test strips (ASCENSIA AUTODISC VI;ONE TOUCH ULTRA TEST VI) strip Check each morning. [id number] Monie Nguyen for Pharmacy to substitute 100 strip 5    Lancets MISC Check sugars as directed. [id number] Monie Nguyen for pharmacy to substitute 100 each 5     No current facility-administered medications for this visit.          Leigha Jaramillo MD PGY-2    This case was discussedwith Dr(s)

## 2018-11-20 ENCOUNTER — TELEPHONE (OUTPATIENT)
Dept: FAMILY MEDICINE CLINIC | Age: 58
End: 2018-11-20

## 2018-11-20 NOTE — TELEPHONE ENCOUNTER
Please call the patient and schedule him to see me in 1-2 weeks, due to high blood sugar of 496, and restarting Lantus. He needs to measure his glucose 4 times a day and write  the results in a log. He is to check his glucose 4 times a day, one time in the morning prior to breakfast, 2 more times before meals, and one time prior to his nifghtly Lantus. If he is having any trouble getting his glucose monitoring materials please let me know. I Have attempted to call the patient myself multiple time but he is not getting the phone.     Freddie Sullivan MD PGY-2

## 2018-11-27 NOTE — TELEPHONE ENCOUNTER
Yoli Miladis returned call. Has not yet obtained insulin or testing supplies due to finances. Will obtain insulin after 12-1-18 but will not be able to get testing supplies.

## 2018-11-27 NOTE — TELEPHONE ENCOUNTER
Office staff was able to get him free Levemir. He was instructed to come pick it up.   Rafael Hernandez MD PGY-2

## 2018-12-06 RX ORDER — FINASTERIDE 5 MG/1
5 TABLET, FILM COATED ORAL DAILY
Qty: 90 TABLET | Refills: 1 | Status: SHIPPED | OUTPATIENT
Start: 2018-12-06 | End: 2019-02-08 | Stop reason: SDUPTHER

## 2018-12-13 ENCOUNTER — OFFICE VISIT (OUTPATIENT)
Dept: FAMILY MEDICINE CLINIC | Age: 58
End: 2018-12-13
Payer: MEDICARE

## 2018-12-13 VITALS
SYSTOLIC BLOOD PRESSURE: 126 MMHG | BODY MASS INDEX: 42.66 KG/M2 | RESPIRATION RATE: 18 BRPM | HEIGHT: 72 IN | TEMPERATURE: 98.2 F | HEART RATE: 74 BPM | OXYGEN SATURATION: 96 % | DIASTOLIC BLOOD PRESSURE: 70 MMHG | WEIGHT: 315 LBS

## 2018-12-13 DIAGNOSIS — E11.69 DIABETES MELLITUS TYPE 2 IN OBESE (HCC): ICD-10-CM

## 2018-12-13 DIAGNOSIS — E66.9 DIABETES MELLITUS TYPE 2 IN OBESE (HCC): ICD-10-CM

## 2018-12-13 LAB — GLUCOSE BLD-MCNC: 343 MG/DL

## 2018-12-13 PROCEDURE — 82962 GLUCOSE BLOOD TEST: CPT | Performed by: STUDENT IN AN ORGANIZED HEALTH CARE EDUCATION/TRAINING PROGRAM

## 2018-12-13 PROCEDURE — 99213 OFFICE O/P EST LOW 20 MIN: CPT | Performed by: STUDENT IN AN ORGANIZED HEALTH CARE EDUCATION/TRAINING PROGRAM

## 2018-12-13 PROCEDURE — 99212 OFFICE O/P EST SF 10 MIN: CPT | Performed by: STUDENT IN AN ORGANIZED HEALTH CARE EDUCATION/TRAINING PROGRAM

## 2018-12-13 RX ORDER — MIRTAZAPINE 15 MG/1
TABLET, FILM COATED ORAL
Qty: 30 TABLET | Refills: 3 | Status: CANCELLED | OUTPATIENT
Start: 2018-12-13

## 2018-12-13 RX ORDER — GLIPIZIDE 10 MG/1
10 TABLET ORAL
Qty: 90 TABLET | Refills: 3 | Status: CANCELLED | OUTPATIENT
Start: 2018-12-13

## 2018-12-13 RX ORDER — LOSARTAN POTASSIUM 25 MG/1
25 TABLET ORAL DAILY
Qty: 30 TABLET | Refills: 3 | Status: CANCELLED | OUTPATIENT
Start: 2018-12-13

## 2018-12-13 RX ORDER — PAROXETINE HYDROCHLORIDE 20 MG/1
20 TABLET, FILM COATED ORAL DAILY
Qty: 30 TABLET | Refills: 3 | Status: CANCELLED | OUTPATIENT
Start: 2018-12-13

## 2018-12-13 RX ORDER — TRAZODONE HYDROCHLORIDE 100 MG/1
200 TABLET ORAL NIGHTLY
Qty: 60 TABLET | Refills: 5 | Status: CANCELLED | OUTPATIENT
Start: 2018-12-13

## 2018-12-13 NOTE — PATIENT INSTRUCTIONS
Come in every two weeks to get fasting glucose checked in AM.    Increase Lantus to 15 units nightly.

## 2018-12-13 NOTE — PROGRESS NOTES
S1 and S2 normal, no murmur, rub or gallop. Abdomen: Soft, non-tender, bowel sounds normoactive, no masses, no organomegaly  Extremities:   No edema. Skin: Skin color, texture, turgor normal, no rashes or lesions  Neurologic:   Alert&Oriented. Assessment and Plan      Diabetes mellitus type 2 in obese [facility])  -Patient recently restarted using insulin, due to elevated A1c. Today insulin was increased to 15 units nightly from 10 units nightly. He is to continue metformin, and glipizide. Patient was advised to come in every 2 weeks to get fasting glucose checked, because  he is unwilling to pay for strips so he can check at home. - POCT Glucose-343  - insulin glargine (LANTUS) 100 UNIT/ML injection pen; Inject 15 Units into the skin nightly  Dispense: 5 pen; Refill: 0        Return to Office: Return in about 1 month (around 1/13/2019) for DM.       Medication List:    Current Outpatient Prescriptions   Medication Sig Dispense Refill    finasteride (PROSCAR) 5 MG tablet Take 1 tablet by mouth daily 90 tablet 1    tamsulosin (FLOMAX) 0.4 MG capsule take 1 capsule by mouth once daily 90 capsule 1    PARoxetine (PAXIL) 20 MG tablet Take 1 tablet by mouth daily 30 tablet 3    losartan (COZAAR) 25 MG tablet Take 1 tablet by mouth daily 30 tablet 3    omeprazole (PRILOSEC) 20 MG delayed release capsule Take 1 capsule by mouth daily 90 capsule 1    glipiZIDE (GLUCOTROL) 10 MG tablet Take 1 tablet by mouth 3 times daily (before meals) 90 tablet 3    aspirin 81 MG EC tablet Take 1 tablet by mouth daily 90 tablet 1    hydrochlorothiazide (HYDRODIURIL) 25 MG tablet Take 1 tablet by mouth daily 90 tablet 1    diphenhydrAMINE (BENADRYL) 25 MG capsule Take 3 capsules by mouth nightly 90 capsule 3    atorvastatin (LIPITOR) 40 MG tablet take 1 tablet by mouth once daily 90 tablet 1    insulin glargine (LANTUS) 100 UNIT/ML injection pen Inject 10 Units into the skin nightly 5 pen 0    mirtazapine (REMERON) 15 MG

## 2019-01-03 ENCOUNTER — OFFICE VISIT (OUTPATIENT)
Dept: FAMILY MEDICINE CLINIC | Age: 59
End: 2019-01-03
Payer: MEDICARE

## 2019-01-03 VITALS
OXYGEN SATURATION: 98 % | DIASTOLIC BLOOD PRESSURE: 82 MMHG | SYSTOLIC BLOOD PRESSURE: 132 MMHG | TEMPERATURE: 98.6 F | WEIGHT: 315 LBS | BODY MASS INDEX: 42.66 KG/M2 | HEART RATE: 88 BPM | RESPIRATION RATE: 16 BRPM | HEIGHT: 72 IN

## 2019-01-03 DIAGNOSIS — Z76.0 MEDICATION REFILL: ICD-10-CM

## 2019-01-03 DIAGNOSIS — E66.9 DIABETES MELLITUS TYPE 2 IN OBESE (HCC): Primary | ICD-10-CM

## 2019-01-03 DIAGNOSIS — E11.69 DIABETES MELLITUS TYPE 2 IN OBESE (HCC): Primary | ICD-10-CM

## 2019-01-03 LAB — GLUCOSE BLD-MCNC: 276 MG/DL

## 2019-01-03 PROCEDURE — 82962 GLUCOSE BLOOD TEST: CPT | Performed by: STUDENT IN AN ORGANIZED HEALTH CARE EDUCATION/TRAINING PROGRAM

## 2019-01-03 PROCEDURE — 99213 OFFICE O/P EST LOW 20 MIN: CPT | Performed by: STUDENT IN AN ORGANIZED HEALTH CARE EDUCATION/TRAINING PROGRAM

## 2019-01-03 PROCEDURE — 99212 OFFICE O/P EST SF 10 MIN: CPT | Performed by: STUDENT IN AN ORGANIZED HEALTH CARE EDUCATION/TRAINING PROGRAM

## 2019-01-03 RX ORDER — MIRTAZAPINE 15 MG/1
TABLET, FILM COATED ORAL
Qty: 30 TABLET | Refills: 1 | Status: SHIPPED | OUTPATIENT
Start: 2019-01-03 | End: 2019-02-08 | Stop reason: SDUPTHER

## 2019-01-03 RX ORDER — LOSARTAN POTASSIUM 25 MG/1
25 TABLET ORAL DAILY
Qty: 30 TABLET | Refills: 1 | Status: SHIPPED | OUTPATIENT
Start: 2019-01-03 | End: 2019-02-08 | Stop reason: SDUPTHER

## 2019-01-03 RX ORDER — TRAZODONE HYDROCHLORIDE 100 MG/1
200 TABLET ORAL NIGHTLY
Qty: 60 TABLET | Refills: 1 | Status: SHIPPED | OUTPATIENT
Start: 2019-01-03 | End: 2019-02-08 | Stop reason: SDUPTHER

## 2019-01-03 RX ORDER — PAROXETINE HYDROCHLORIDE 20 MG/1
20 TABLET, FILM COATED ORAL DAILY
Qty: 30 TABLET | Refills: 1 | Status: SHIPPED | OUTPATIENT
Start: 2019-01-03 | End: 2019-02-08 | Stop reason: SDUPTHER

## 2019-01-03 ASSESSMENT — ENCOUNTER SYMPTOMS: SHORTNESS OF BREATH: 0

## 2019-02-08 ENCOUNTER — OFFICE VISIT (OUTPATIENT)
Dept: FAMILY MEDICINE CLINIC | Age: 59
End: 2019-02-08
Payer: MEDICARE

## 2019-02-08 VITALS
WEIGHT: 315 LBS | HEIGHT: 72 IN | SYSTOLIC BLOOD PRESSURE: 137 MMHG | DIASTOLIC BLOOD PRESSURE: 84 MMHG | HEART RATE: 72 BPM | BODY MASS INDEX: 42.66 KG/M2 | OXYGEN SATURATION: 96 %

## 2019-02-08 DIAGNOSIS — G47.00 INSOMNIA, UNSPECIFIED TYPE: ICD-10-CM

## 2019-02-08 DIAGNOSIS — Z76.0 MEDICATION REFILL: ICD-10-CM

## 2019-02-08 DIAGNOSIS — E66.9 DIABETES MELLITUS TYPE 2 IN OBESE (HCC): Primary | ICD-10-CM

## 2019-02-08 DIAGNOSIS — E11.69 DIABETES MELLITUS TYPE 2 IN OBESE (HCC): Primary | ICD-10-CM

## 2019-02-08 LAB — GLUCOSE BLD-MCNC: 332 MG/DL

## 2019-02-08 PROCEDURE — 82962 GLUCOSE BLOOD TEST: CPT | Performed by: STUDENT IN AN ORGANIZED HEALTH CARE EDUCATION/TRAINING PROGRAM

## 2019-02-08 PROCEDURE — 99212 OFFICE O/P EST SF 10 MIN: CPT | Performed by: STUDENT IN AN ORGANIZED HEALTH CARE EDUCATION/TRAINING PROGRAM

## 2019-02-08 PROCEDURE — 99213 OFFICE O/P EST LOW 20 MIN: CPT | Performed by: STUDENT IN AN ORGANIZED HEALTH CARE EDUCATION/TRAINING PROGRAM

## 2019-02-08 RX ORDER — ATORVASTATIN CALCIUM 40 MG/1
TABLET, FILM COATED ORAL
Qty: 90 TABLET | Refills: 1 | Status: SHIPPED | OUTPATIENT
Start: 2019-02-08 | End: 2019-07-19 | Stop reason: SDUPTHER

## 2019-02-08 RX ORDER — PAROXETINE HYDROCHLORIDE 20 MG/1
20 TABLET, FILM COATED ORAL DAILY
Qty: 30 TABLET | Refills: 1 | Status: SHIPPED | OUTPATIENT
Start: 2019-02-08 | End: 2019-03-25 | Stop reason: SDUPTHER

## 2019-02-08 RX ORDER — LOSARTAN POTASSIUM 25 MG/1
25 TABLET ORAL DAILY
Qty: 30 TABLET | Refills: 1 | Status: SHIPPED | OUTPATIENT
Start: 2019-02-08 | End: 2019-03-25 | Stop reason: SDUPTHER

## 2019-02-08 RX ORDER — OMEPRAZOLE 20 MG/1
20 CAPSULE, DELAYED RELEASE ORAL DAILY
Qty: 90 CAPSULE | Refills: 1 | Status: SHIPPED | OUTPATIENT
Start: 2019-02-08 | End: 2019-07-19 | Stop reason: SDUPTHER

## 2019-02-08 RX ORDER — MIRTAZAPINE 30 MG/1
TABLET, FILM COATED ORAL
Qty: 30 TABLET | Refills: 1 | Status: SHIPPED | OUTPATIENT
Start: 2019-02-08 | End: 2019-03-25 | Stop reason: SDUPTHER

## 2019-02-08 RX ORDER — GABAPENTIN 100 MG/1
100 CAPSULE ORAL NIGHTLY
Qty: 30 CAPSULE | Refills: 1 | Status: SHIPPED | OUTPATIENT
Start: 2019-02-08 | End: 2019-03-25 | Stop reason: SDUPTHER

## 2019-02-08 RX ORDER — ASPIRIN 81 MG/1
81 TABLET ORAL DAILY
Qty: 90 TABLET | Refills: 1 | Status: SHIPPED | OUTPATIENT
Start: 2019-02-08 | End: 2019-07-19 | Stop reason: SDUPTHER

## 2019-02-08 RX ORDER — TAMSULOSIN HYDROCHLORIDE 0.4 MG/1
CAPSULE ORAL
Qty: 90 CAPSULE | Refills: 1 | Status: SHIPPED | OUTPATIENT
Start: 2019-02-08 | End: 2019-07-19 | Stop reason: SDUPTHER

## 2019-02-08 RX ORDER — ATENOLOL 50 MG/1
TABLET ORAL
Qty: 180 TABLET | Refills: 1 | Status: SHIPPED | OUTPATIENT
Start: 2019-02-08 | End: 2019-07-19 | Stop reason: SDUPTHER

## 2019-02-08 RX ORDER — GLIPIZIDE 10 MG/1
10 TABLET ORAL
Qty: 90 TABLET | Refills: 3 | Status: CANCELLED | OUTPATIENT
Start: 2019-02-08

## 2019-02-08 RX ORDER — CALCIPOTRIENE 50 UG/G
CREAM TOPICAL
Qty: 360 G | Refills: 1 | Status: CANCELLED | OUTPATIENT
Start: 2019-02-08

## 2019-02-08 RX ORDER — DIPHENHYDRAMINE HCL 25 MG
50 CAPSULE ORAL NIGHTLY
Qty: 90 CAPSULE | Refills: 3 | Status: SHIPPED | OUTPATIENT
Start: 2019-02-08 | End: 2019-05-01 | Stop reason: SDUPTHER

## 2019-02-08 RX ORDER — AMLODIPINE BESYLATE 10 MG/1
TABLET ORAL
Qty: 90 TABLET | Refills: 1 | Status: SHIPPED | OUTPATIENT
Start: 2019-02-08 | End: 2019-07-19 | Stop reason: SDUPTHER

## 2019-02-08 RX ORDER — TRAZODONE HYDROCHLORIDE 100 MG/1
200 TABLET ORAL NIGHTLY
Qty: 60 TABLET | Refills: 1 | Status: SHIPPED | OUTPATIENT
Start: 2019-02-08 | End: 2019-03-25 | Stop reason: SDUPTHER

## 2019-02-08 RX ORDER — FINASTERIDE 5 MG/1
5 TABLET, FILM COATED ORAL DAILY
Qty: 90 TABLET | Refills: 1 | Status: SHIPPED | OUTPATIENT
Start: 2019-02-08 | End: 2019-12-26 | Stop reason: SDUPTHER

## 2019-02-08 RX ORDER — HYDROCHLOROTHIAZIDE 25 MG/1
25 TABLET ORAL DAILY
Qty: 90 TABLET | Refills: 1 | Status: SHIPPED | OUTPATIENT
Start: 2019-02-08 | End: 2019-07-19 | Stop reason: SDUPTHER

## 2019-02-17 ASSESSMENT — ENCOUNTER SYMPTOMS: SHORTNESS OF BREATH: 0

## 2019-03-25 ENCOUNTER — OFFICE VISIT (OUTPATIENT)
Dept: FAMILY MEDICINE CLINIC | Age: 59
End: 2019-03-25
Payer: MEDICARE

## 2019-03-25 VITALS
OXYGEN SATURATION: 95 % | HEIGHT: 72 IN | TEMPERATURE: 98.6 F | SYSTOLIC BLOOD PRESSURE: 131 MMHG | RESPIRATION RATE: 16 BRPM | HEART RATE: 77 BPM | WEIGHT: 315 LBS | DIASTOLIC BLOOD PRESSURE: 76 MMHG | BODY MASS INDEX: 42.66 KG/M2

## 2019-03-25 DIAGNOSIS — E11.69 DIABETES MELLITUS TYPE 2 IN OBESE (HCC): Primary | ICD-10-CM

## 2019-03-25 DIAGNOSIS — Z76.0 MEDICATION REFILL: ICD-10-CM

## 2019-03-25 DIAGNOSIS — E66.9 DIABETES MELLITUS TYPE 2 IN OBESE (HCC): Primary | ICD-10-CM

## 2019-03-25 LAB
GLUCOSE BLD-MCNC: 383 MG/DL
HBA1C MFR BLD: 11.2 %

## 2019-03-25 PROCEDURE — 99212 OFFICE O/P EST SF 10 MIN: CPT | Performed by: STUDENT IN AN ORGANIZED HEALTH CARE EDUCATION/TRAINING PROGRAM

## 2019-03-25 PROCEDURE — 99213 OFFICE O/P EST LOW 20 MIN: CPT | Performed by: STUDENT IN AN ORGANIZED HEALTH CARE EDUCATION/TRAINING PROGRAM

## 2019-03-25 PROCEDURE — 83036 HEMOGLOBIN GLYCOSYLATED A1C: CPT | Performed by: STUDENT IN AN ORGANIZED HEALTH CARE EDUCATION/TRAINING PROGRAM

## 2019-03-25 PROCEDURE — 82962 GLUCOSE BLOOD TEST: CPT | Performed by: STUDENT IN AN ORGANIZED HEALTH CARE EDUCATION/TRAINING PROGRAM

## 2019-03-25 RX ORDER — PAROXETINE HYDROCHLORIDE 20 MG/1
20 TABLET, FILM COATED ORAL DAILY
Qty: 30 TABLET | Refills: 1 | Status: SHIPPED | OUTPATIENT
Start: 2019-03-25 | End: 2019-05-01 | Stop reason: SDUPTHER

## 2019-03-25 RX ORDER — MIRTAZAPINE 30 MG/1
TABLET, FILM COATED ORAL
Qty: 30 TABLET | Refills: 1 | Status: SHIPPED | OUTPATIENT
Start: 2019-03-25 | End: 2019-05-01 | Stop reason: SDUPTHER

## 2019-03-25 RX ORDER — GABAPENTIN 100 MG/1
100 CAPSULE ORAL NIGHTLY
Qty: 30 CAPSULE | Refills: 1 | Status: SHIPPED | OUTPATIENT
Start: 2019-03-25 | End: 2019-05-26 | Stop reason: SDUPTHER

## 2019-03-25 RX ORDER — TRAZODONE HYDROCHLORIDE 100 MG/1
200 TABLET ORAL NIGHTLY
Qty: 60 TABLET | Refills: 1 | Status: SHIPPED | OUTPATIENT
Start: 2019-03-25 | End: 2019-05-01 | Stop reason: SDUPTHER

## 2019-03-25 RX ORDER — LOSARTAN POTASSIUM 25 MG/1
25 TABLET ORAL DAILY
Qty: 30 TABLET | Refills: 1 | Status: SHIPPED | OUTPATIENT
Start: 2019-03-25 | End: 2019-05-01 | Stop reason: SDUPTHER

## 2019-03-25 ASSESSMENT — ENCOUNTER SYMPTOMS: SHORTNESS OF BREATH: 0

## 2019-04-01 DIAGNOSIS — E11.69 DIABETES MELLITUS TYPE 2 IN OBESE (HCC): ICD-10-CM

## 2019-04-01 DIAGNOSIS — E66.9 DIABETES MELLITUS TYPE 2 IN OBESE (HCC): ICD-10-CM

## 2019-04-02 DIAGNOSIS — E11.69 DIABETES MELLITUS TYPE 2 IN OBESE (HCC): ICD-10-CM

## 2019-04-02 DIAGNOSIS — E66.9 DIABETES MELLITUS TYPE 2 IN OBESE (HCC): ICD-10-CM

## 2019-05-01 DIAGNOSIS — Z76.0 MEDICATION REFILL: ICD-10-CM

## 2019-05-01 RX ORDER — MIRTAZAPINE 30 MG/1
TABLET, FILM COATED ORAL
Qty: 30 TABLET | Refills: 1 | Status: SHIPPED | OUTPATIENT
Start: 2019-05-01 | End: 2019-07-12 | Stop reason: SDUPTHER

## 2019-05-01 RX ORDER — LOSARTAN POTASSIUM 25 MG/1
25 TABLET ORAL DAILY
Qty: 30 TABLET | Refills: 1 | Status: SHIPPED | OUTPATIENT
Start: 2019-05-01 | End: 2019-07-19 | Stop reason: SDUPTHER

## 2019-05-01 RX ORDER — DOCUSATE SODIUM 100 MG/1
100 CAPSULE, LIQUID FILLED ORAL 2 TIMES DAILY
Qty: 60 CAPSULE | Refills: 5 | Status: SHIPPED | OUTPATIENT
Start: 2019-05-01 | End: 2019-07-19 | Stop reason: SDUPTHER

## 2019-05-01 RX ORDER — DIPHENHYDRAMINE HCL 50 MG
50 CAPSULE ORAL NIGHTLY
Qty: 90 CAPSULE | Refills: 0 | Status: SHIPPED | OUTPATIENT
Start: 2019-05-01 | End: 2019-07-19 | Stop reason: SDUPTHER

## 2019-05-01 RX ORDER — PAROXETINE HYDROCHLORIDE 20 MG/1
20 TABLET, FILM COATED ORAL DAILY
Qty: 30 TABLET | Refills: 1 | Status: SHIPPED | OUTPATIENT
Start: 2019-05-01 | End: 2019-07-19 | Stop reason: SDUPTHER

## 2019-05-01 RX ORDER — TRAZODONE HYDROCHLORIDE 100 MG/1
200 TABLET ORAL NIGHTLY
Qty: 60 TABLET | Refills: 1 | Status: SHIPPED | OUTPATIENT
Start: 2019-05-01 | End: 2019-07-19 | Stop reason: SDUPTHER

## 2019-05-17 ENCOUNTER — OFFICE VISIT (OUTPATIENT)
Dept: FAMILY MEDICINE CLINIC | Age: 59
End: 2019-05-17
Payer: MEDICARE

## 2019-05-17 VITALS
BODY MASS INDEX: 42.66 KG/M2 | SYSTOLIC BLOOD PRESSURE: 129 MMHG | TEMPERATURE: 97.8 F | HEART RATE: 84 BPM | WEIGHT: 315 LBS | DIASTOLIC BLOOD PRESSURE: 75 MMHG | OXYGEN SATURATION: 93 % | HEIGHT: 72 IN | RESPIRATION RATE: 20 BRPM

## 2019-05-17 DIAGNOSIS — Z76.0 MEDICATION REFILL: ICD-10-CM

## 2019-05-17 DIAGNOSIS — E66.9 DIABETES MELLITUS TYPE 2 IN OBESE (HCC): Primary | ICD-10-CM

## 2019-05-17 DIAGNOSIS — E11.69 DIABETES MELLITUS TYPE 2 IN OBESE (HCC): Primary | ICD-10-CM

## 2019-05-17 DIAGNOSIS — Z79.899 POLYPHARMACY: ICD-10-CM

## 2019-05-17 LAB
CHP ED QC CHECK: NORMAL
GLUCOSE BLD-MCNC: 353 MG/DL

## 2019-05-17 PROCEDURE — 99213 OFFICE O/P EST LOW 20 MIN: CPT | Performed by: STUDENT IN AN ORGANIZED HEALTH CARE EDUCATION/TRAINING PROGRAM

## 2019-05-17 PROCEDURE — 82962 GLUCOSE BLOOD TEST: CPT | Performed by: STUDENT IN AN ORGANIZED HEALTH CARE EDUCATION/TRAINING PROGRAM

## 2019-05-17 PROCEDURE — 99212 OFFICE O/P EST SF 10 MIN: CPT | Performed by: STUDENT IN AN ORGANIZED HEALTH CARE EDUCATION/TRAINING PROGRAM

## 2019-05-17 NOTE — PROGRESS NOTES
Follow up    Doesn't check sugars at home      today      Not always compliant with diet     Surveyed medications, but none can currently be stopped       Blood pressure 129/75, pulse 84, temperature 97.8 °F (36.6 °C), temperature source Oral, resp. rate 20, height 6' (1.829 m), weight (!) 360 lb (163.3 kg), SpO2 93 %. HEENT WNL     Heart regular    Lungs clear    abd non-tender      1+ edema    Pulses intact     Increase insulin to 40    In 1 month labs    Refill rxs    Attending Physician Statement  I have discussed the case, including pertinent history and exam findings with the resident. I agree with the documented assessment and plan.

## 2019-05-17 NOTE — PROGRESS NOTES
[address] [facility]  Department of Family Medicine  Family Medicine Residency Program      Patient:  Carroll Bonilla 62 y.o. male     Date of Service: 5/17/19      Chief complaint:   Chief Complaint   Patient presents with    Diabetes    Medication Refill         History of Present Illness   The patient is a 62 y.o. male  presented to the clinic with complaints as above. Takes metformin (1000 BID), and  lantus 29 Units nightly. Was previously on glipizibut was stopped due to starting insulin. Compliance with medication: Usually compliant, but not recently. Due to emesis last week, and diarrhea earlier this week. Home blood glucose monitoring range: No, cant afford strips ($44). Checks here during visits to the clinic. Hypoglycemic episode(s): No  Statin: Yes  ACEI: Lisinopril stopped but on losartan  ASA: Yes  Ophthalmology: Yes saw in July, [id number] [address] for now  [address] in [place] in the past but cant afford $45 co pay  Dentistry: No, has no teeth  Compliance with diet: Eating twice a day. Patient takes many medications. We went through his medication list.  There are no medications that we were able to stop either due to necessity, or his willingness. Patient to the CHF, and CAD S/P 2 stents placed. He follows with cardiology, and is due to see the cardiologist soon. PastMedical History:      Diagnosis Date    Anxiety     Asthma     CAD (coronary artery disease) 2008    2 stents in 2008 .  CHF (congestive heart failure) (HCC)     Chills     Chronic sinusitis     Cocaine abuse ([facility] [address]     COPD (chronic obstructive pulmonary disease) (HCC)     Depression     Diabetes mellitus (HCC)     on insulin    GERD (gastroesophageal reflux disease)     H/O cardiovascular stress test     H/O chest x-ray     Hand fracture 3/2010    Fractured both hands.  Hepatitis C     Work up was negative.  Saw specialist     History of tobacco abuse     Hyperlipidemia     Hypertension  Night sweats     NSTEMI (non-ST elevation myocardial infarction) ([facility] [address] 3/2010    Hospitalized.  Obesity     Oxygen dependent     Panic attacks     Pneumonia 3/2010     LLL.  Sleep apnea     Has CPAP machine    SOB (shortness of breath)     does not know settings       Past Surgical History:        Procedure Laterality Date    COLONOSCOPY  2011    COLONOSCOPY  9/23/15   [facility] PROCEDURE  2/25/2009    [facility], cardiac cath/primary BM stent in proximal LAD.     OTHER SURGICAL HISTORY      anal fistula    TONSILLECTOMY         Allergies:    Amoxicillin and Penicillins    Social History:   Social History     Socioeconomic History    Marital status: Single     Spouse name: Not on file    Number of children: Not on file    Years of education: 15    Highest education level: Not on file   Occupational History    Occupation: disabled-   Social Needs    Financial resource strain: Not on file    Food insecurity:     Worry: Not on file     Inability: Not on file    Transportation needs:     Medical: Not on file     Non-medical: Not on file   Tobacco Use    Smoking status: Former Smoker     Packs/day: 2.00     Years: 38.00     Pack years: 76.00     Types: Cigarettes     Last attempt to quit: 8/5/2008     Years since quitting: 10.7    Smokeless tobacco: Never Used   Substance and Sexual Activity    Alcohol use: No     Alcohol/week: 0.0 oz     Comment: was heavy drinker; quit age 35;  drinks 1-2 cups of coffee daily    Drug use: No     Comment: Acid, marijuana, cocaine, STOPPED ALL DRUGS 1993    Sexual activity: Not on file   Lifestyle    Physical activity:     Days per week: Not on file     Minutes per session: Not on file    Stress: Not on file   Relationships    Social connections:     Talks on phone: Not on file     Gets together: Not on file     Attends Hinduism service: Not on file     Active member of club or organization: Not on file     Attends meetings of clubs or organizations: Not on file     Relationship status: Not on file    Intimate partner violence:     Fear of current or ex partner: Not on file     Emotionally abused: Not on file     Physically abused: Not on file     Forced sexual activity: Not on file   Other Topics Concern    Not on file   Social History Narrative    Not on file        Family History:       Problem Relation Age of Onset    Heart Failure Father     Diabetes Mother     Heart Surgery Brother        Review of Systems:   Review of Systems   Constitutional: Negative for fever. Respiratory: Negative for shortness of breath. Cardiovascular: Negative for chest pain. Genitourinary: Negative for dysuria. Physical Exam   Vitals: /75 (Site: Left Upper Arm, Position: Sitting, Cuff Size: Medium Adult)   Pulse 84   Temp 97.8 °F (36.6 °C) (Oral)   Resp 20   Ht 6' (1.829 m)   Wt (!) 360 lb (163.3 kg)   SpO2 93%   BMI 48.82 kg/m²   General Appearance: Well developed, awake,alert, oriented, no acute distress  HEENT: Normocephalic, atraumatic. Chest wall/Lung: Clear toauscultation bilaterally,  respirations unlabored. Noronchi/wheezing/rales. On supplemental O2. Heart: Regular rate andrhythm, S1 and S2 normal, no murmur, rub or gallop. Abdomen: Soft, non-tender, bowel sounds normoactive, no masses, no organomegaly  Extremities:    1+ edema b/l  Neurologic:   Alert&Oriented. Psychiatric: has a normal mood and affect. Behavior is normal.       Assessment and Plan       Diabetes mellitus type 2 in obese (HCC)  -Increase insulin to 39 units (was on 29U) Nightly. Hyperglycemic today. Check A1c next visit.  -Continue to come to the clinic for POCT glucose checks  - POCT Glucose-353  - insulin glargine (LANTUS) 100 UNIT/ML injection pen; Inject 39 Units into the skin nightly  Dispense: 5 pen; Refill: 0    Polypharmacy  -Went through medication list, but unable to stop anything today.   Will reassess in the future. Medication Refill  -Med list reviewed and no meds needed to be refilled today            Additional plans and future considerations: Check Lipids next visit. HCM-vaccinations. Return to Office: Return in about 1 month (around 6/17/2019) for DM. Medication List:    Current Outpatient Medications   Medication Sig Dispense Refill    losartan (COZAAR) 25 MG tablet Take 1 tablet by mouth daily 30 tablet 1    mirtazapine (REMERON) 30 MG tablet take 1 tablet by mouth every evening 30 tablet 1    PARoxetine (PAXIL) 20 MG tablet Take 1 tablet by mouth daily 30 tablet 1    traZODone (DESYREL) 100 MG tablet Take 2 tablets by mouth nightly 60 tablet 1    docusate sodium (DOCQLACE) 100 MG capsule Take 1 capsule by mouth 2 times daily 60 capsule 5    diphenhydrAMINE (BENADRYL) 50 MG capsule Take 1 capsule by mouth nightly 90 capsule 0    insulin glargine (LANTUS) 100 UNIT/ML injection pen Inject 29 Units into the skin nightly 5 pen 0    metFORMIN (GLUCOPHAGE) 1000 MG tablet take 1 tablet by mouth twice a day with meals 180 tablet 1    Fluticasone-Umeclidin-Vilant 100-62.5-25 MCG/INH AEPB Inhale 1 puff into the lungs daily 1 each 1    gabapentin (NEURONTIN) 100 MG capsule Take 1 capsule by mouth nightly for 30 days.  Intended supply: 30 days 30 capsule 1    finasteride (PROSCAR) 5 MG tablet Take 1 tablet by mouth daily 90 tablet 1    tamsulosin (FLOMAX) 0.4 MG capsule take 1 capsule by mouth once daily 90 capsule 1    omeprazole (PRILOSEC) 20 MG delayed release capsule Take 1 capsule by mouth daily 90 capsule 1    aspirin 81 MG EC tablet Take 1 tablet by mouth daily 90 tablet 1    hydrochlorothiazide (HYDRODIURIL) 25 MG tablet Take 1 tablet by mouth daily 90 tablet 1    atorvastatin (LIPITOR) 40 MG tablet take 1 tablet by mouth once daily 90 tablet 1    amLODIPine (NORVASC) 10 MG tablet take 1 tablet by mouth once daily 90 tablet 1    atenolol (TENORMIN) 50 MG tablet take 1 tablet by mouth twice

## 2019-05-19 ASSESSMENT — ENCOUNTER SYMPTOMS: SHORTNESS OF BREATH: 0

## 2019-05-20 ENCOUNTER — TELEPHONE (OUTPATIENT)
Dept: CASE MANAGEMENT | Age: 59
End: 2019-05-20

## 2019-05-28 RX ORDER — CALCIPOTRIENE 50 UG/G
CREAM TOPICAL
Qty: 360 G | Refills: 1 | Status: SHIPPED | OUTPATIENT
Start: 2019-05-28 | End: 2019-12-16 | Stop reason: SDUPTHER

## 2019-05-28 NOTE — TELEPHONE ENCOUNTER
Requested Prescriptions     Pending Prescriptions Disp Refills    calcipotriene (DOVONEX) 0.005 % cream 360 g 1     Sig: APPLY 1-2 GRAMS TOPICALLY TO AFFECTED AREA 1-2 TIMES A DAY.   DO NOT APPLY TO FACE       Next appt is 7/19/2019  Last appt was 5/17/2019

## 2019-06-07 ENCOUNTER — HOSPITAL ENCOUNTER (OUTPATIENT)
Dept: CT IMAGING | Age: 59
Discharge: HOME | End: 2019-06-09
Payer: MEDICARE

## 2019-06-07 DIAGNOSIS — Z87.891 PERSONAL HISTORY OF TOBACCO USE: ICD-10-CM

## 2019-06-07 PROCEDURE — G0297 LDCT FOR LUNG CA SCREEN: HCPCS

## 2019-06-12 ENCOUNTER — TELEPHONE (OUTPATIENT)
Dept: CASE MANAGEMENT | Age: 59
End: 2019-06-12

## 2019-06-12 NOTE — TELEPHONE ENCOUNTER
No call, encounter opened to process CT Lung Screening.       CT Lung Screen 5/25/18 :  Impression       Lung - RADS Category 2 :  Benign Appearance or Behavior - Nodules with   a very low likelihood of becoming a clinically active cancer due to   size or lack of growth - Continue annual lung CT screening in 12   months.                  Olga Mac a  59 y. o. male who is a former (9 years ago) smoker with a 76 pack year smoking history.      Letter mailed to patient  5/29/2018 encouraging him  to call Anabelle Lopez for results and follow up recommendations if needed.             5/20/2019 No call, encounter opened for documentation in the lung nodule navigator flow sheet/ Lung screening patient.  Annual CT lung screening reminder mailed to patient. Reminders will be sent every 30 days until this patient schedules their screening exam or exceeds 60 days past due. Once a screening is over 60 days past due, the patient will be removed from active program surveillance and future follow up reminders. 6/7/19 CT Lung Screening: Michelle Ho  Impression           1. Limited examination due to patient body habitus.       2. Stable 3 mm nodule located in right middle lobe is likely benign,   however continued follow-up could be helpful for further evaluation.       3. No new nodule or evidence of lung mass.       Lung - RADS Category 3 :  Probably benign finding (s) - short term   follow - up suggested, includes nodules with a low likelihood of   becoming a clinically active cancer - Follow-up CT scan in 6 months.             Pulmonologist suggests yearly screening.     GABBY Velazquez., R.T.(R)(T)  Lung Nodule Navigator  CT Lung Cancer Screening- [facility]

## 2019-07-12 DIAGNOSIS — Z76.0 MEDICATION REFILL: ICD-10-CM

## 2019-07-12 RX ORDER — MIRTAZAPINE 30 MG/1
TABLET, FILM COATED ORAL
Qty: 30 TABLET | Refills: 1 | Status: SHIPPED | OUTPATIENT
Start: 2019-07-12 | End: 2019-09-05 | Stop reason: SDUPTHER

## 2019-07-19 ENCOUNTER — OFFICE VISIT (OUTPATIENT)
Dept: FAMILY MEDICINE CLINIC | Age: 59
End: 2019-07-19
Payer: MEDICARE

## 2019-07-19 ENCOUNTER — HOSPITAL ENCOUNTER (OUTPATIENT)
Age: 59
Discharge: HOME | End: 2019-07-21
Payer: MEDICARE

## 2019-07-19 VITALS
HEART RATE: 79 BPM | RESPIRATION RATE: 16 BRPM | TEMPERATURE: 98.2 F | BODY MASS INDEX: 42.66 KG/M2 | HEIGHT: 72 IN | OXYGEN SATURATION: 97 % | SYSTOLIC BLOOD PRESSURE: 129 MMHG | WEIGHT: 315 LBS | DIASTOLIC BLOOD PRESSURE: 79 MMHG

## 2019-07-19 DIAGNOSIS — E66.9 DIABETES MELLITUS TYPE 2 IN OBESE (HCC): ICD-10-CM

## 2019-07-19 DIAGNOSIS — E11.69 DIABETES MELLITUS TYPE 2 IN OBESE (HCC): ICD-10-CM

## 2019-07-19 DIAGNOSIS — Z79.899 POLYPHARMACY: ICD-10-CM

## 2019-07-19 DIAGNOSIS — Z76.0 MEDICATION REFILL: ICD-10-CM

## 2019-07-19 LAB
ALBUMIN SERPL-MCNC: 3.9 G/DL (ref 3.5–5.2)
ALP BLD-CCNC: 79 U/L (ref 40–129)
ALT SERPL-CCNC: 24 U/L (ref 0–40)
ANION GAP SERPL CALCULATED.3IONS-SCNC: 16 MMOL/L (ref 7–16)
AST SERPL-CCNC: 34 U/L (ref 0–39)
BASOPHILS ABSOLUTE: 0.09 E9/L (ref 0–0.2)
BASOPHILS RELATIVE PERCENT: 0.7 % (ref 0–2)
BILIRUB SERPL-MCNC: <0.2 MG/DL (ref 0–1.2)
BUN BLDV-MCNC: 26 MG/DL (ref 6–20)
CALCIUM SERPL-MCNC: 10 MG/DL (ref 8.6–10.2)
CHLORIDE BLD-SCNC: 92 MMOL/L (ref 98–107)
CHOLESTEROL, TOTAL: 192 MG/DL (ref 0–199)
CO2: 26 MMOL/L (ref 22–29)
CREAT SERPL-MCNC: 1.7 MG/DL (ref 0.7–1.2)
EOSINOPHILS ABSOLUTE: 0.13 E9/L (ref 0.05–0.5)
EOSINOPHILS RELATIVE PERCENT: 1 % (ref 0–6)
GFR AFRICAN AMERICAN: 50
GFR NON-AFRICAN AMERICAN: 41 ML/MIN/1.73
GLUCOSE BLD-MCNC: 504 MG/DL (ref 74–99)
HBA1C MFR BLD: 11.8 % (ref 4–5.6)
HCT VFR BLD CALC: 46.4 % (ref 37–54)
HDLC SERPL-MCNC: 27 MG/DL
HEMOGLOBIN: 14.4 G/DL (ref 12.5–16.5)
IMMATURE GRANULOCYTES #: 0.13 E9/L
IMMATURE GRANULOCYTES %: 1 % (ref 0–5)
LDL CHOLESTEROL CALCULATED: ABNORMAL MG/DL (ref 0–99)
LYMPHOCYTES ABSOLUTE: 3.09 E9/L (ref 1.5–4)
LYMPHOCYTES RELATIVE PERCENT: 24.6 % (ref 20–42)
MCH RBC QN AUTO: 27.6 PG (ref 26–35)
MCHC RBC AUTO-ENTMCNC: 31 % (ref 32–34.5)
MCV RBC AUTO: 88.9 FL (ref 80–99.9)
MONOCYTES ABSOLUTE: 0.78 E9/L (ref 0.1–0.95)
MONOCYTES RELATIVE PERCENT: 6.2 % (ref 2–12)
NEUTROPHILS ABSOLUTE: 8.36 E9/L (ref 1.8–7.3)
NEUTROPHILS RELATIVE PERCENT: 66.5 % (ref 43–80)
PDW BLD-RTO: 13.2 FL (ref 11.5–15)
PLATELET # BLD: 355 E9/L (ref 130–450)
PMV BLD AUTO: 10.7 FL (ref 7–12)
POTASSIUM SERPL-SCNC: 5.3 MMOL/L (ref 3.5–5)
RBC # BLD: 5.22 E12/L (ref 3.8–5.8)
SODIUM BLD-SCNC: 134 MMOL/L (ref 132–146)
TOTAL PROTEIN: 7.6 G/DL (ref 6.4–8.3)
TRIGL SERPL-MCNC: 519 MG/DL (ref 0–149)
VLDLC SERPL CALC-MCNC: ABNORMAL MG/DL
WBC # BLD: 12.6 E9/L (ref 4.5–11.5)

## 2019-07-19 PROCEDURE — 83036 HEMOGLOBIN GLYCOSYLATED A1C: CPT

## 2019-07-19 PROCEDURE — 99213 OFFICE O/P EST LOW 20 MIN: CPT | Performed by: STUDENT IN AN ORGANIZED HEALTH CARE EDUCATION/TRAINING PROGRAM

## 2019-07-19 PROCEDURE — 36415 COLL VENOUS BLD VENIPUNCTURE: CPT

## 2019-07-19 PROCEDURE — 80061 LIPID PANEL: CPT

## 2019-07-19 PROCEDURE — 85025 COMPLETE CBC W/AUTO DIFF WBC: CPT

## 2019-07-19 PROCEDURE — 80053 COMPREHEN METABOLIC PANEL: CPT

## 2019-07-19 PROCEDURE — 99212 OFFICE O/P EST SF 10 MIN: CPT | Performed by: STUDENT IN AN ORGANIZED HEALTH CARE EDUCATION/TRAINING PROGRAM

## 2019-07-19 PROCEDURE — 36415 COLL VENOUS BLD VENIPUNCTURE: CPT | Performed by: STUDENT IN AN ORGANIZED HEALTH CARE EDUCATION/TRAINING PROGRAM

## 2019-07-19 PROCEDURE — 36415 COLL VENOUS BLD VENIPUNCTURE: CPT | Performed by: FAMILY MEDICINE

## 2019-07-19 RX ORDER — PAROXETINE HYDROCHLORIDE 20 MG/1
20 TABLET, FILM COATED ORAL DAILY
Qty: 30 TABLET | Refills: 1 | Status: SHIPPED | OUTPATIENT
Start: 2019-07-19 | End: 2019-09-05 | Stop reason: SDUPTHER

## 2019-07-19 RX ORDER — GABAPENTIN 100 MG/1
CAPSULE ORAL
Qty: 90 CAPSULE | Refills: 0 | Status: SHIPPED | OUTPATIENT
Start: 2019-07-19 | End: 2019-09-05 | Stop reason: SDUPTHER

## 2019-07-19 RX ORDER — ATORVASTATIN CALCIUM 40 MG/1
TABLET, FILM COATED ORAL
Qty: 90 TABLET | Refills: 1 | Status: SHIPPED | OUTPATIENT
Start: 2019-07-19 | End: 2019-07-20 | Stop reason: SDUPTHER

## 2019-07-19 RX ORDER — TRAZODONE HYDROCHLORIDE 100 MG/1
200 TABLET ORAL NIGHTLY
Qty: 60 TABLET | Refills: 1 | Status: SHIPPED | OUTPATIENT
Start: 2019-07-19 | End: 2019-09-05 | Stop reason: SDUPTHER

## 2019-07-19 RX ORDER — DIPHENHYDRAMINE HCL 50 MG
50 CAPSULE ORAL NIGHTLY
Qty: 90 CAPSULE | Refills: 0 | Status: SHIPPED | OUTPATIENT
Start: 2019-07-19 | End: 2019-10-04 | Stop reason: SDUPTHER

## 2019-07-19 RX ORDER — AMLODIPINE BESYLATE 10 MG/1
TABLET ORAL
Qty: 90 TABLET | Refills: 1 | Status: SHIPPED | OUTPATIENT
Start: 2019-07-19 | End: 2019-09-05 | Stop reason: SDUPTHER

## 2019-07-19 RX ORDER — ASPIRIN 81 MG/1
81 TABLET ORAL DAILY
Qty: 90 TABLET | Refills: 1 | Status: SHIPPED | OUTPATIENT
Start: 2019-07-19 | End: 2019-12-16 | Stop reason: SDUPTHER

## 2019-07-19 RX ORDER — OMEPRAZOLE 20 MG/1
20 CAPSULE, DELAYED RELEASE ORAL DAILY
Qty: 90 CAPSULE | Refills: 1 | Status: SHIPPED | OUTPATIENT
Start: 2019-07-19 | End: 2019-09-05 | Stop reason: SDUPTHER

## 2019-07-19 RX ORDER — ATENOLOL 50 MG/1
TABLET ORAL
Qty: 180 TABLET | Refills: 1 | Status: SHIPPED | OUTPATIENT
Start: 2019-07-19 | End: 2019-09-05 | Stop reason: SDUPTHER

## 2019-07-19 RX ORDER — DOCUSATE SODIUM 100 MG/1
100 CAPSULE, LIQUID FILLED ORAL 2 TIMES DAILY
Qty: 60 CAPSULE | Refills: 5 | Status: SHIPPED | OUTPATIENT
Start: 2019-07-19 | End: 2019-10-04 | Stop reason: SDUPTHER

## 2019-07-19 RX ORDER — HYDROCHLOROTHIAZIDE 25 MG/1
25 TABLET ORAL DAILY
Qty: 90 TABLET | Refills: 1 | Status: SHIPPED | OUTPATIENT
Start: 2019-07-19 | End: 2019-09-05 | Stop reason: SDUPTHER

## 2019-07-19 RX ORDER — TAMSULOSIN HYDROCHLORIDE 0.4 MG/1
CAPSULE ORAL
Qty: 90 CAPSULE | Refills: 1 | Status: SHIPPED | OUTPATIENT
Start: 2019-07-19 | End: 2019-12-16 | Stop reason: SDUPTHER

## 2019-07-19 RX ORDER — LOSARTAN POTASSIUM 25 MG/1
25 TABLET ORAL DAILY
Qty: 30 TABLET | Refills: 1 | Status: SHIPPED | OUTPATIENT
Start: 2019-07-19 | End: 2019-09-05 | Stop reason: SDUPTHER

## 2019-07-19 NOTE — PROGRESS NOTES
OIL) 1000 MG CAPS Take 1,000 mg by mouth daily.  promethazine (PHENERGAN) 25 MG tablet Take 25 mg by mouth every 6 hours as needed for Nausea.  Ipratropium-Albuterol (COMBIVENT IN) Inhale 2 puffs into the lungs 2 times daily       blood glucose monitor strips Test 3 times a day & as needed for symptoms of irregular blood glucose. 100 strip 1    Insulin Pen Needle 31G X 8 MM MISC 1 each by Does not apply route daily 100 each 3    glucose blood VI test strips (ASCENSIA AUTODISC VI;ONE TOUCH ULTRA TEST VI) strip Check each morning. [id number] for Pharmacy to substitute 100 strip 5    Lancets MISC Check sugars as directed. [id number] Nguyen for pharmacy to substitute 100 each 5    OXYGEN Inhale 6 L into the lungs continuous        No current facility-administered medications for this visit. Jasmyn Coto MD PGY-2    This case was discussedwith Dr Randee Garcia (s)      This document may have been prepared at least partially through the use of voice recognition software. Although effort is taken to assure the accuracy of this document, it is possible that grammatical, syntax,  or spelling errors may occur.

## 2019-07-20 ENCOUNTER — TELEPHONE (OUTPATIENT)
Dept: FAMILY MEDICINE CLINIC | Age: 59
End: 2019-07-20

## 2019-07-20 DIAGNOSIS — E11.69 DIABETES MELLITUS TYPE 2 IN OBESE (HCC): Primary | ICD-10-CM

## 2019-07-20 DIAGNOSIS — E66.9 DIABETES MELLITUS TYPE 2 IN OBESE (HCC): Primary | ICD-10-CM

## 2019-07-20 DIAGNOSIS — N18.30 CKD (CHRONIC KIDNEY DISEASE) STAGE 3, GFR 30-59 ML/MIN (HCC): ICD-10-CM

## 2019-07-20 PROBLEM — Z79.899 POLYPHARMACY: Status: ACTIVE | Noted: 2019-07-20

## 2019-07-20 RX ORDER — ATORVASTATIN CALCIUM 80 MG/1
TABLET, FILM COATED ORAL
Qty: 30 TABLET | Refills: 1 | Status: SHIPPED | OUTPATIENT
Start: 2019-07-20 | End: 2019-09-05 | Stop reason: SDUPTHER

## 2019-07-20 ASSESSMENT — ENCOUNTER SYMPTOMS: SHORTNESS OF BREATH: 0

## 2019-07-22 ENCOUNTER — HOSPITAL ENCOUNTER (OUTPATIENT)
Age: 59
Discharge: HOME | End: 2019-07-24
Payer: MEDICARE

## 2019-07-22 DIAGNOSIS — E66.9 DIABETES MELLITUS TYPE 2 IN OBESE (HCC): ICD-10-CM

## 2019-07-22 DIAGNOSIS — E11.69 DIABETES MELLITUS TYPE 2 IN OBESE (HCC): ICD-10-CM

## 2019-07-22 LAB
ANION GAP SERPL CALCULATED.3IONS-SCNC: 19 MMOL/L (ref 7–16)
BACTERIA: ABNORMAL /HPF
BILIRUBIN URINE: NEGATIVE
BLOOD, URINE: NEGATIVE
BUN BLDV-MCNC: 22 MG/DL (ref 6–20)
CALCIUM SERPL-MCNC: 9.7 MG/DL (ref 8.6–10.2)
CHLORIDE BLD-SCNC: 93 MMOL/L (ref 98–107)
CLARITY: CLEAR
CO2: 24 MMOL/L (ref 22–29)
COLOR: YELLOW
CREAT SERPL-MCNC: 1.3 MG/DL (ref 0.7–1.2)
CREATININE URINE: 142 MG/DL (ref 40–278)
EPITHELIAL CELLS, UA: ABNORMAL /HPF
GFR AFRICAN AMERICAN: >60
GFR NON-AFRICAN AMERICAN: 57 ML/MIN/1.73
GLUCOSE BLD-MCNC: 387 MG/DL (ref 74–99)
GLUCOSE URINE: >=1000 MG/DL
KETONES, URINE: NEGATIVE MG/DL
LEUKOCYTE ESTERASE, URINE: NEGATIVE
MICROALBUMIN UR-MCNC: 214.9 MG/L
MICROALBUMIN/CREAT UR-RTO: 151.3 (ref 0–30)
NITRITE, URINE: NEGATIVE
PH UA: 5.5 (ref 5–9)
POTASSIUM SERPL-SCNC: 4.5 MMOL/L (ref 3.5–5)
PROTEIN UA: 30 MG/DL
RBC UA: ABNORMAL /HPF (ref 0–2)
SODIUM BLD-SCNC: 136 MMOL/L (ref 132–146)
SPECIFIC GRAVITY UA: 1.02 (ref 1–1.03)
UROBILINOGEN, URINE: 0.2 E.U./DL
WBC UA: ABNORMAL /HPF (ref 0–5)

## 2019-07-22 PROCEDURE — 82570 ASSAY OF URINE CREATININE: CPT

## 2019-07-22 PROCEDURE — 36415 COLL VENOUS BLD VENIPUNCTURE: CPT

## 2019-07-22 PROCEDURE — 80048 BASIC METABOLIC PNL TOTAL CA: CPT

## 2019-07-22 PROCEDURE — 81001 URINALYSIS AUTO W/SCOPE: CPT

## 2019-07-22 PROCEDURE — 82044 UR ALBUMIN SEMIQUANTITATIVE: CPT

## 2019-07-23 ENCOUNTER — TELEPHONE (OUTPATIENT)
Dept: FAMILY MEDICINE CLINIC | Age: 59
End: 2019-07-23

## 2019-08-05 ENCOUNTER — TELEPHONE (OUTPATIENT)
Dept: FAMILY MEDICINE CLINIC | Age: 59
End: 2019-08-05

## 2019-08-05 ENCOUNTER — NURSE ONLY (OUTPATIENT)
Dept: FAMILY MEDICINE CLINIC | Age: 59
End: 2019-08-05
Payer: MEDICARE

## 2019-08-05 DIAGNOSIS — E11.69 DIABETES MELLITUS TYPE 2 IN OBESE (HCC): Primary | ICD-10-CM

## 2019-08-05 DIAGNOSIS — E66.9 DIABETES MELLITUS TYPE 2 IN OBESE (HCC): Primary | ICD-10-CM

## 2019-08-05 LAB
CHP ED QC CHECK: NORMAL
GLUCOSE BLD-MCNC: 364 MG/DL

## 2019-08-05 PROCEDURE — 82962 GLUCOSE BLOOD TEST: CPT

## 2019-08-05 NOTE — TELEPHONE ENCOUNTER
Called  patient about his blood sugar today of 364. He did not get the phone. Please Inform him to increase his insulin from 45 units nightly to 56 units daily. He is to split the dose and take 28 units in the morning, and 28 units in the evening. Thanks.   Eliezer Velasco MD PGY-3

## 2019-08-14 ENCOUNTER — TELEPHONE (OUTPATIENT)
Dept: ADMINISTRATIVE | Age: 59
End: 2019-08-14

## 2019-08-14 DIAGNOSIS — E66.9 DIABETES MELLITUS TYPE 2 IN OBESE (HCC): ICD-10-CM

## 2019-08-14 DIAGNOSIS — E11.69 DIABETES MELLITUS TYPE 2 IN OBESE (HCC): ICD-10-CM

## 2019-09-05 ENCOUNTER — OFFICE VISIT (OUTPATIENT)
Dept: FAMILY MEDICINE CLINIC | Age: 59
End: 2019-09-05
Payer: MEDICARE

## 2019-09-05 VITALS
RESPIRATION RATE: 18 BRPM | TEMPERATURE: 98.2 F | SYSTOLIC BLOOD PRESSURE: 131 MMHG | HEART RATE: 77 BPM | DIASTOLIC BLOOD PRESSURE: 87 MMHG | WEIGHT: 315 LBS | BODY MASS INDEX: 42.66 KG/M2 | HEIGHT: 72 IN | OXYGEN SATURATION: 95 %

## 2019-09-05 DIAGNOSIS — F32.A DEPRESSION, UNSPECIFIED DEPRESSION TYPE: ICD-10-CM

## 2019-09-05 DIAGNOSIS — Z76.0 MEDICATION REFILL: ICD-10-CM

## 2019-09-05 DIAGNOSIS — E66.9 DIABETES MELLITUS TYPE 2 IN OBESE (HCC): Primary | ICD-10-CM

## 2019-09-05 DIAGNOSIS — E11.69 DIABETES MELLITUS TYPE 2 IN OBESE (HCC): Primary | ICD-10-CM

## 2019-09-05 DIAGNOSIS — Z79.899 POLYPHARMACY: ICD-10-CM

## 2019-09-05 LAB
CHP ED QC CHECK: NORMAL
GLUCOSE BLD-MCNC: 404 MG/DL

## 2019-09-05 PROCEDURE — 99212 OFFICE O/P EST SF 10 MIN: CPT | Performed by: STUDENT IN AN ORGANIZED HEALTH CARE EDUCATION/TRAINING PROGRAM

## 2019-09-05 PROCEDURE — 99213 OFFICE O/P EST LOW 20 MIN: CPT | Performed by: STUDENT IN AN ORGANIZED HEALTH CARE EDUCATION/TRAINING PROGRAM

## 2019-09-05 PROCEDURE — 82962 GLUCOSE BLOOD TEST: CPT | Performed by: STUDENT IN AN ORGANIZED HEALTH CARE EDUCATION/TRAINING PROGRAM

## 2019-09-05 RX ORDER — LOSARTAN POTASSIUM 25 MG/1
25 TABLET ORAL DAILY
Qty: 30 TABLET | Refills: 1 | Status: SHIPPED | OUTPATIENT
Start: 2019-09-05 | End: 2019-12-16 | Stop reason: SDUPTHER

## 2019-09-05 RX ORDER — AMLODIPINE BESYLATE 10 MG/1
TABLET ORAL
Qty: 90 TABLET | Refills: 1 | Status: SHIPPED | OUTPATIENT
Start: 2019-09-05 | End: 2019-12-16 | Stop reason: SDUPTHER

## 2019-09-05 RX ORDER — ATORVASTATIN CALCIUM 80 MG/1
TABLET, FILM COATED ORAL
Qty: 30 TABLET | Refills: 1 | Status: SHIPPED | OUTPATIENT
Start: 2019-09-05 | End: 2019-12-16 | Stop reason: SDUPTHER

## 2019-09-05 RX ORDER — TAMSULOSIN HYDROCHLORIDE 0.4 MG/1
CAPSULE ORAL
Qty: 90 CAPSULE | Refills: 1 | Status: CANCELLED | OUTPATIENT
Start: 2019-09-05

## 2019-09-05 RX ORDER — PAROXETINE 10 MG/1
10 TABLET, FILM COATED ORAL DAILY
Qty: 30 TABLET | Refills: 0 | Status: SHIPPED | OUTPATIENT
Start: 2019-09-05 | End: 2019-10-04 | Stop reason: SDUPTHER

## 2019-09-05 RX ORDER — ATENOLOL 50 MG/1
TABLET ORAL
Qty: 180 TABLET | Refills: 1 | Status: SHIPPED | OUTPATIENT
Start: 2019-09-05 | End: 2019-12-16 | Stop reason: SDUPTHER

## 2019-09-05 RX ORDER — FINASTERIDE 5 MG/1
5 TABLET, FILM COATED ORAL DAILY
Qty: 90 TABLET | Refills: 1 | Status: CANCELLED | OUTPATIENT
Start: 2019-09-05

## 2019-09-05 RX ORDER — GABAPENTIN 100 MG/1
200 CAPSULE ORAL 3 TIMES DAILY
Qty: 540 CAPSULE | Refills: 0 | Status: SHIPPED | OUTPATIENT
Start: 2019-09-05 | End: 2019-10-04 | Stop reason: SDUPTHER

## 2019-09-05 RX ORDER — OMEPRAZOLE 20 MG/1
20 CAPSULE, DELAYED RELEASE ORAL DAILY
Qty: 90 CAPSULE | Refills: 1 | Status: SHIPPED | OUTPATIENT
Start: 2019-09-05 | End: 2019-12-16 | Stop reason: SDUPTHER

## 2019-09-05 RX ORDER — MIRTAZAPINE 30 MG/1
30 TABLET, FILM COATED ORAL NIGHTLY
Qty: 30 TABLET | Refills: 1 | Status: SHIPPED | OUTPATIENT
Start: 2019-09-05 | End: 2019-10-04 | Stop reason: SDUPTHER

## 2019-09-05 RX ORDER — TRAZODONE HYDROCHLORIDE 100 MG/1
200 TABLET ORAL NIGHTLY
Qty: 60 TABLET | Refills: 1 | Status: SHIPPED | OUTPATIENT
Start: 2019-09-05 | End: 2019-10-04 | Stop reason: SDUPTHER

## 2019-09-05 RX ORDER — HYDROCHLOROTHIAZIDE 25 MG/1
25 TABLET ORAL DAILY
Qty: 90 TABLET | Refills: 1 | Status: SHIPPED | OUTPATIENT
Start: 2019-09-05 | End: 2019-12-16 | Stop reason: SDUPTHER

## 2019-09-05 NOTE — PROGRESS NOTES
S: Elizabeth Butts 62 y.o. male  here for medication refills. Complicated medical history, including poorly controlled IRDM  IRDM: blood sugar in office >400. Noncompliant with diet; compliant with medication when he can afford it. He cannot afford food or meds. Needs assistance with affordability. Last HgbA1C >14%. Self titrated Lantus to 30 units BID. Complicated by DM neuropathy with minimal compliance with Gabapentin. PMH: CAD S/P MI; COPD/Emphysema on O2@ 4 L/min. Denies tobacco, EtOH  O: VS: /87 (Site: Right Upper Arm, Position: Sitting, Cuff Size: Large Adult)   Pulse 77   Temp 98.2 °F (36.8 °C) (Oral)   Resp 18   Ht 6' (1.829 m)   Wt (!) 360 lb (163.3 kg)   SpO2 95%   BMI 48.82 kg/m²    General: NAD   CV:  RRR, no gallops, rubs, or murmurs   Resp: CTAB no R/R/W   Abd:  Soft, nontender, no masses    Ext:  no C/C/E    Assessment / Plan:      Ranjith Shearer was seen today for diabetes. Diagnoses and all orders for this visit:    Diabetes mellitus type 2 in obese (HCC)  -     atorvastatin (LIPITOR) 80 MG tablet; take 1 tablet by mouth once daily  -     metFORMIN (GLUCOPHAGE) 1000 MG tablet; take 1 tablet by mouth twice a day with meals  -     gabapentin (NEURONTIN) 100 MG capsule; Take 2 capsules by mouth 3 times daily for 90 days. take 1 capsule by mouth every evening for 1 month  -     insulin glargine (LANTUS) 100 UNIT/ML injection pen; Inject 35 Units into the skin 2 times daily  -     POCT Glucose    Medication refill  -     losartan (COZAAR) 25 MG tablet; Take 1 tablet by mouth daily  -     PARoxetine (PAXIL) 10 MG tablet; Take 1 tablet by mouth daily  -     traZODone (DESYREL) 100 MG tablet; Take 2 tablets by mouth nightly  -     omeprazole (PRILOSEC) 20 MG delayed release capsule; Take 1 capsule by mouth daily  -     hydrochlorothiazide (HYDRODIURIL) 25 MG tablet;  Take 1 tablet by mouth daily  -     amLODIPine (NORVASC) 10 MG tablet; take 1 tablet by mouth once daily  -     atenolol (TENORMIN) 50 MG tablet; take 1 tablet by mouth twice a day  -     mirtazapine (REMERON) 30 MG tablet; Take 1 tablet by mouth nightly    Depression, unspecified depression type    Polypharmacy    IRDM: poorly controled with multiple factors involved. Increase Lantus to 35 units BID. Consider referral to Endocrinology for comprehensive management of DM  BPH: trial off Flomax, Proscar  Polypharmacy: decrease Paxil per patient request due to symptom stability and polypharmacy; off Flomax & Proscar  Diabetic Neuropathy: increase Gabapentin to 300 mg TID  Meds reviewed and refilled         Return in about 1 month (around 10/5/2019) for DM, Paxil Decrease. F/U all of the above in 1 months    Attending Physician Statement  I have discussed the case, including pertinent history and exam findings with the resident. I agree with the documented assessment and plan.          Serafin Parks MD

## 2019-09-05 NOTE — PROGRESS NOTES
[address] [facility]  Department of Family Medicine  Family Medicine Residency Program      Patient:  Elizabeth Butts 62 y.o. male     Date of Service: 9/5/19      Chief complaint:   Chief Complaint   Patient presents with    Diabetes     F/U         History of Present Illness   The patient is a 62 y.o. male  presented to the clinic with complaints as above. Patient is here for follow-up of diabetes. He is currently on 30 units of Lantus twice daily. Last A1c was 11.8. Glucose today is 404. Takes insulin most days, but due to price sometimes he does not have any. They have been providing him with free samples here in the clinic. Home blood glucose monitoring range: No, cant afford strips ($44). Checks here during visits to the clinic. Hypoglycemic episode(s): No  Statin: Yes  ACEI: Lisinopril stopped but on losartan  ASA: Yes  Ophthalmology: Saw the eye doctor recently, and eyes are okay. Podiatry: Seen Podiatrist in [place] in the past but cant afford $45 co pay  Dentistry: No, has no teeth  Compliance with diet: Admits to eating pasta last night. Patient went to try and stop using Flomax, and finasteride. There is no record of BPH in the chart. He did have some inflammation in his prostate in the past according to urology. He is going to try to not use these medications. See how it goes. PastMedical History:      Diagnosis Date    Anxiety     Asthma     CAD (coronary artery disease) 2008    2 stents in 2008 .  [facility] ([facility])     Chills     Chronic sinusitis     Cocaine abuse ([address]     COPD (chronic obstructive pulmonary disease) (HCC)     Depression     Diabetes mellitus (HCC)     on insulin    GERD (gastroesophageal reflux disease)     H/O cardiovascular stress test     H/O chest x-ray     Hand fracture 3/2010    Fractured both hands.  Hepatitis C     Work up was negative.  Saw specialist     History of tobacco abuse     Hyperlipidemia     file     Attends meetings of clubs or organizations: Not on file     Relationship status: Not on file    Intimate partner violence:     Fear of current or ex partner: Not on file     Emotionally abused: Not on file     Physically abused: Not on file     Forced sexual activity: Not on file   Other Topics Concern    Not on file   Social History Narrative    Not on file        Family History:       Problem Relation Age of Onset    Heart Failure Father     Diabetes Mother     Heart Surgery Brother        Review of Systems:   Review of Systems    Physical Exam   Vitals: /87 (Site: Right Upper Arm, Position: Sitting, Cuff Size: Large Adult)   Pulse 77   Temp 98.2 °F (36.8 °C) (Oral)   Resp 18   Ht 6' (1.829 m)   Wt (!) 360 lb (163.3 kg)   SpO2 95%   BMI 48.82 kg/m²     General Appearance: Well developed, awake,alert, oriented, no acute distress  HEENT: Normocephalic, atraumatic. Chest wall/Lung: Clear toauscultation bilaterally,  respirations unlabored. Noronchi/wheezing/rales. On 4 L O2. Heart: Regular rate andrhythm, S1 and S2 normal, no murmur, rub or gallop. Abdomen: Soft, non-tender, bowel sounds normoactive, no masses, no organomegaly  Extremities:  Extremities normal, atraumatic, no cyanosis. Trace edema B/L. Neurologic:   Alert&Oriented. Psychiatric: has a normal mood and affect. Behavior is normal.     Assessment and Plan       Diabetes mellitus type 2 in obese (HCC)  -Increased insulin to 35 units twice daily, needs to continue on metformin. Provide him with some free insulin here in the office. Glucose today was 404.    - atorvastatin (LIPITOR) 80 MG tablet; take 1 tablet by mouth once daily  Dispense: 30 tablet; Refill: 1  - metFORMIN (GLUCOPHAGE) 1000 MG tablet; take 1 tablet by mouth twice a day with meals  Dispense: 180 tablet; Refill: 1  - gabapentin (NEURONTIN) 100 MG capsule; Take 2 capsules by mouth 3 times daily for 90 days.  take 1 capsule by mouth every evening for

## 2019-09-09 ENCOUNTER — TELEPHONE (OUTPATIENT)
Dept: FAMILY MEDICINE CLINIC | Age: 59
End: 2019-09-09

## 2019-09-09 NOTE — TELEPHONE ENCOUNTER
The patient would like an antibiotic called in if possible  He has a fever, cough, cold, congested, ears hurt  He did not wish to come in for an acute visit,   Please advise

## 2019-10-04 ENCOUNTER — OFFICE VISIT (OUTPATIENT)
Dept: FAMILY MEDICINE CLINIC | Age: 59
End: 2019-10-04
Payer: MEDICARE

## 2019-10-04 VITALS
WEIGHT: 315 LBS | DIASTOLIC BLOOD PRESSURE: 81 MMHG | OXYGEN SATURATION: 96 % | RESPIRATION RATE: 16 BRPM | SYSTOLIC BLOOD PRESSURE: 136 MMHG | HEART RATE: 67 BPM | BODY MASS INDEX: 42.66 KG/M2 | TEMPERATURE: 97.8 F | HEIGHT: 72 IN

## 2019-10-04 DIAGNOSIS — E11.69 DIABETES MELLITUS TYPE 2 IN OBESE (HCC): Primary | ICD-10-CM

## 2019-10-04 DIAGNOSIS — Z79.899 POLYPHARMACY: ICD-10-CM

## 2019-10-04 DIAGNOSIS — E66.9 DIABETES MELLITUS TYPE 2 IN OBESE (HCC): Primary | ICD-10-CM

## 2019-10-04 DIAGNOSIS — Z76.0 MEDICATION REFILL: ICD-10-CM

## 2019-10-04 LAB
CHP ED QC CHECK: NORMAL
GLUCOSE BLD-MCNC: 429 MG/DL

## 2019-10-04 PROCEDURE — 99213 OFFICE O/P EST LOW 20 MIN: CPT | Performed by: STUDENT IN AN ORGANIZED HEALTH CARE EDUCATION/TRAINING PROGRAM

## 2019-10-04 PROCEDURE — 99212 OFFICE O/P EST SF 10 MIN: CPT | Performed by: STUDENT IN AN ORGANIZED HEALTH CARE EDUCATION/TRAINING PROGRAM

## 2019-10-04 PROCEDURE — 82962 GLUCOSE BLOOD TEST: CPT | Performed by: STUDENT IN AN ORGANIZED HEALTH CARE EDUCATION/TRAINING PROGRAM

## 2019-10-04 RX ORDER — PAROXETINE 10 MG/1
5 TABLET, FILM COATED ORAL DAILY
Qty: 30 TABLET | Refills: 0 | Status: SHIPPED | OUTPATIENT
Start: 2019-10-04 | End: 2019-12-16 | Stop reason: ALTCHOICE

## 2019-10-04 RX ORDER — GABAPENTIN 100 MG/1
200 CAPSULE ORAL 3 TIMES DAILY
Qty: 540 CAPSULE | Refills: 0 | Status: SHIPPED | OUTPATIENT
Start: 2019-10-04 | End: 2019-10-04

## 2019-10-04 RX ORDER — TRAZODONE HYDROCHLORIDE 100 MG/1
200 TABLET ORAL NIGHTLY
Qty: 60 TABLET | Refills: 1 | Status: SHIPPED | OUTPATIENT
Start: 2019-10-04 | End: 2019-12-16 | Stop reason: SDUPTHER

## 2019-10-04 RX ORDER — DIPHENHYDRAMINE HCL 50 MG
50 CAPSULE ORAL NIGHTLY
Qty: 90 CAPSULE | Refills: 0 | Status: SHIPPED | OUTPATIENT
Start: 2019-10-04 | End: 2019-11-12 | Stop reason: SDUPTHER

## 2019-10-04 RX ORDER — MIRTAZAPINE 30 MG/1
30 TABLET, FILM COATED ORAL NIGHTLY
Qty: 30 TABLET | Refills: 1 | Status: SHIPPED | OUTPATIENT
Start: 2019-10-04 | End: 2019-12-16 | Stop reason: SDUPTHER

## 2019-10-04 RX ORDER — DOCUSATE SODIUM 100 MG/1
100 CAPSULE, LIQUID FILLED ORAL 2 TIMES DAILY
Qty: 60 CAPSULE | Refills: 5 | Status: SHIPPED | OUTPATIENT
Start: 2019-10-04 | End: 2019-12-16 | Stop reason: SDUPTHER

## 2019-10-04 RX ORDER — PROMETHAZINE HYDROCHLORIDE 25 MG/1
25 TABLET ORAL EVERY 6 HOURS PRN
Status: CANCELLED | OUTPATIENT
Start: 2019-10-04

## 2019-10-06 ASSESSMENT — ENCOUNTER SYMPTOMS: SHORTNESS OF BREATH: 0

## 2019-11-04 DIAGNOSIS — E11.69 DIABETES MELLITUS TYPE 2 IN OBESE (HCC): ICD-10-CM

## 2019-11-04 DIAGNOSIS — E66.9 DIABETES MELLITUS TYPE 2 IN OBESE (HCC): ICD-10-CM

## 2019-11-06 ENCOUNTER — TELEPHONE (OUTPATIENT)
Dept: FAMILY MEDICINE CLINIC | Age: 59
End: 2019-11-06

## 2019-11-06 DIAGNOSIS — E66.9 DIABETES MELLITUS TYPE 2 IN OBESE (HCC): ICD-10-CM

## 2019-11-06 DIAGNOSIS — E11.69 DIABETES MELLITUS TYPE 2 IN OBESE (HCC): ICD-10-CM

## 2019-11-07 RX ORDER — GABAPENTIN 100 MG/1
200 CAPSULE ORAL 3 TIMES DAILY
Qty: 540 CAPSULE | Refills: 0 | Status: SHIPPED | OUTPATIENT
Start: 2019-11-07 | End: 2019-11-14 | Stop reason: SDUPTHER

## 2019-11-08 ENCOUNTER — TELEPHONE (OUTPATIENT)
Dept: FAMILY MEDICINE CLINIC | Age: 59
End: 2019-11-08

## 2019-11-11 ENCOUNTER — OFFICE VISIT (OUTPATIENT)
Dept: FAMILY MEDICINE CLINIC | Age: 59
End: 2019-11-11
Payer: MEDICARE

## 2019-11-11 VITALS
RESPIRATION RATE: 16 BRPM | DIASTOLIC BLOOD PRESSURE: 69 MMHG | HEIGHT: 72 IN | SYSTOLIC BLOOD PRESSURE: 125 MMHG | WEIGHT: 315 LBS | BODY MASS INDEX: 42.66 KG/M2 | OXYGEN SATURATION: 96 % | TEMPERATURE: 98.1 F | HEART RATE: 80 BPM

## 2019-11-11 DIAGNOSIS — E11.69 DIABETES MELLITUS TYPE 2 IN OBESE (HCC): Primary | ICD-10-CM

## 2019-11-11 DIAGNOSIS — Z76.0 MEDICATION REFILL: ICD-10-CM

## 2019-11-11 DIAGNOSIS — E66.9 DIABETES MELLITUS TYPE 2 IN OBESE (HCC): Primary | ICD-10-CM

## 2019-11-11 LAB — HBA1C MFR BLD: 13.5 %

## 2019-11-11 PROCEDURE — 83036 HEMOGLOBIN GLYCOSYLATED A1C: CPT | Performed by: STUDENT IN AN ORGANIZED HEALTH CARE EDUCATION/TRAINING PROGRAM

## 2019-11-11 PROCEDURE — 99213 OFFICE O/P EST LOW 20 MIN: CPT | Performed by: STUDENT IN AN ORGANIZED HEALTH CARE EDUCATION/TRAINING PROGRAM

## 2019-11-11 PROCEDURE — 99212 OFFICE O/P EST SF 10 MIN: CPT | Performed by: STUDENT IN AN ORGANIZED HEALTH CARE EDUCATION/TRAINING PROGRAM

## 2019-11-11 RX ORDER — TRAZODONE HYDROCHLORIDE 100 MG/1
200 TABLET ORAL NIGHTLY
Qty: 60 TABLET | Refills: 1 | Status: CANCELLED | OUTPATIENT
Start: 2019-11-11

## 2019-11-12 RX ORDER — DIPHENHYDRAMINE HCL 50 MG
50 CAPSULE ORAL NIGHTLY
Qty: 90 CAPSULE | Refills: 0 | Status: SHIPPED | OUTPATIENT
Start: 2019-11-12 | End: 2019-12-16 | Stop reason: SDUPTHER

## 2019-11-13 ASSESSMENT — ENCOUNTER SYMPTOMS: SHORTNESS OF BREATH: 0

## 2019-11-14 ENCOUNTER — TELEPHONE (OUTPATIENT)
Dept: FAMILY MEDICINE CLINIC | Age: 59
End: 2019-11-14

## 2019-11-14 DIAGNOSIS — E66.9 DIABETES MELLITUS TYPE 2 IN OBESE (HCC): ICD-10-CM

## 2019-11-14 DIAGNOSIS — E11.69 DIABETES MELLITUS TYPE 2 IN OBESE (HCC): ICD-10-CM

## 2019-11-14 RX ORDER — GABAPENTIN 100 MG/1
200 CAPSULE ORAL 2 TIMES DAILY
Qty: 360 CAPSULE | Refills: 0 | Status: SHIPPED | OUTPATIENT
Start: 2019-11-14 | End: 2019-11-20 | Stop reason: SDUPTHER

## 2019-11-18 ENCOUNTER — TELEPHONE (OUTPATIENT)
Dept: FAMILY MEDICINE CLINIC | Age: 59
End: 2019-11-18

## 2019-11-18 NOTE — TELEPHONE ENCOUNTER
Pharmacy phoned stated that there is two directions  on the patients gabapentin   Please place new order   2 po BID for 90 days  Thank you April

## 2019-11-20 RX ORDER — GABAPENTIN 100 MG/1
200 CAPSULE ORAL 2 TIMES DAILY
Qty: 360 CAPSULE | Refills: 0 | Status: SHIPPED | OUTPATIENT
Start: 2019-11-20 | End: 2019-12-16 | Stop reason: SDUPTHER

## 2019-11-27 ENCOUNTER — HOSPITAL ENCOUNTER (OUTPATIENT)
Dept: ULTRASOUND IMAGING | Age: 59
Discharge: HOME | End: 2019-11-29
Payer: MEDICARE

## 2019-11-27 DIAGNOSIS — N18.30 CHRONIC KIDNEY DISEASE, STAGE III (MODERATE) (HCC): ICD-10-CM

## 2019-11-27 DIAGNOSIS — I25.10 CORONARY ARTERIOSCLEROSIS: ICD-10-CM

## 2019-11-27 DIAGNOSIS — R80.9 MICROALBUMINURIA: ICD-10-CM

## 2019-11-27 DIAGNOSIS — E78.5 HYPERLIPIDEMIA, UNSPECIFIED HYPERLIPIDEMIA TYPE: ICD-10-CM

## 2019-11-27 DIAGNOSIS — I10 ESSENTIAL HYPERTENSION: ICD-10-CM

## 2019-11-27 PROCEDURE — 76775 US EXAM ABDO BACK WALL LIM: CPT

## 2019-11-27 PROCEDURE — 76770 US EXAM ABDO BACK WALL COMP: CPT

## 2019-12-16 ENCOUNTER — OFFICE VISIT (OUTPATIENT)
Dept: FAMILY MEDICINE CLINIC | Age: 59
End: 2019-12-16
Payer: MEDICARE

## 2019-12-16 VITALS
RESPIRATION RATE: 18 BRPM | DIASTOLIC BLOOD PRESSURE: 74 MMHG | WEIGHT: 315 LBS | SYSTOLIC BLOOD PRESSURE: 123 MMHG | BODY MASS INDEX: 42.66 KG/M2 | HEIGHT: 72 IN | HEART RATE: 76 BPM | TEMPERATURE: 98.2 F | OXYGEN SATURATION: 96 %

## 2019-12-16 DIAGNOSIS — Z76.0 MEDICATION REFILL: ICD-10-CM

## 2019-12-16 DIAGNOSIS — E11.69 DIABETES MELLITUS TYPE 2 IN OBESE (HCC): ICD-10-CM

## 2019-12-16 DIAGNOSIS — J06.9 UPPER RESPIRATORY TRACT INFECTION, UNSPECIFIED TYPE: ICD-10-CM

## 2019-12-16 DIAGNOSIS — E66.9 DIABETES MELLITUS TYPE 2 IN OBESE (HCC): ICD-10-CM

## 2019-12-16 DIAGNOSIS — R05.9 COUGH: Primary | ICD-10-CM

## 2019-12-16 LAB
CHP ED QC CHECK: YES
GLUCOSE BLD-MCNC: 394 MG/DL

## 2019-12-16 PROCEDURE — 99212 OFFICE O/P EST SF 10 MIN: CPT | Performed by: STUDENT IN AN ORGANIZED HEALTH CARE EDUCATION/TRAINING PROGRAM

## 2019-12-16 PROCEDURE — 99213 OFFICE O/P EST LOW 20 MIN: CPT | Performed by: STUDENT IN AN ORGANIZED HEALTH CARE EDUCATION/TRAINING PROGRAM

## 2019-12-16 PROCEDURE — 82962 GLUCOSE BLOOD TEST: CPT | Performed by: STUDENT IN AN ORGANIZED HEALTH CARE EDUCATION/TRAINING PROGRAM

## 2019-12-16 RX ORDER — ATORVASTATIN CALCIUM 80 MG/1
TABLET, FILM COATED ORAL
Qty: 30 TABLET | Refills: 1 | Status: SHIPPED
Start: 2019-12-16 | End: 2020-03-04 | Stop reason: SDUPTHER

## 2019-12-16 RX ORDER — AMLODIPINE BESYLATE 10 MG/1
TABLET ORAL
Qty: 90 TABLET | Refills: 1 | Status: SHIPPED
Start: 2019-12-16 | End: 2020-03-04 | Stop reason: SDUPTHER

## 2019-12-16 RX ORDER — LANCETS 30 GAUGE
EACH MISCELLANEOUS
Qty: 100 EACH | Refills: 5 | Status: SHIPPED
Start: 2019-12-16 | End: 2020-08-25 | Stop reason: SDUPTHER

## 2019-12-16 RX ORDER — LOSARTAN POTASSIUM 25 MG/1
25 TABLET ORAL DAILY
Qty: 30 TABLET | Refills: 1 | Status: SHIPPED
Start: 2019-12-16 | End: 2020-03-02 | Stop reason: SDUPTHER

## 2019-12-16 RX ORDER — PAROXETINE 10 MG/1
5 TABLET, FILM COATED ORAL DAILY
Qty: 30 TABLET | Refills: 0 | Status: SHIPPED | OUTPATIENT
Start: 2019-12-16 | End: 2019-12-17 | Stop reason: ALTCHOICE

## 2019-12-16 RX ORDER — OMEPRAZOLE 20 MG/1
20 CAPSULE, DELAYED RELEASE ORAL DAILY
Qty: 90 CAPSULE | Refills: 1 | Status: SHIPPED
Start: 2019-12-16 | End: 2020-02-26 | Stop reason: SDUPTHER

## 2019-12-16 RX ORDER — CHLORAL HYDRATE 500 MG
1000 CAPSULE ORAL DAILY
Qty: 90 CAPSULE | Refills: 1 | Status: SHIPPED
Start: 2019-12-16 | End: 2020-03-04 | Stop reason: SDUPTHER

## 2019-12-16 RX ORDER — ATENOLOL 50 MG/1
TABLET ORAL
Qty: 180 TABLET | Refills: 1 | Status: SHIPPED
Start: 2019-12-16 | End: 2020-05-15 | Stop reason: SDUPTHER

## 2019-12-16 RX ORDER — HYDROCHLOROTHIAZIDE 25 MG/1
25 TABLET ORAL DAILY
Qty: 90 TABLET | Refills: 1 | Status: SHIPPED
Start: 2019-12-16 | End: 2020-03-04 | Stop reason: SDUPTHER

## 2019-12-16 RX ORDER — CALCIPOTRIENE 50 UG/G
CREAM TOPICAL
Qty: 360 G | Refills: 1 | Status: SHIPPED
Start: 2019-12-16 | End: 2020-05-15 | Stop reason: ALTCHOICE

## 2019-12-16 RX ORDER — PROMETHAZINE HYDROCHLORIDE 25 MG/1
25 TABLET ORAL EVERY 6 HOURS PRN
Status: CANCELLED | OUTPATIENT
Start: 2019-12-16

## 2019-12-16 RX ORDER — TRAZODONE HYDROCHLORIDE 100 MG/1
200 TABLET ORAL NIGHTLY
Qty: 60 TABLET | Refills: 1 | Status: SHIPPED | OUTPATIENT
Start: 2019-12-16 | End: 2020-01-06 | Stop reason: SDUPTHER

## 2019-12-16 RX ORDER — TAMSULOSIN HYDROCHLORIDE 0.4 MG/1
CAPSULE ORAL
Qty: 90 CAPSULE | Refills: 1 | Status: SHIPPED
Start: 2019-12-16 | End: 2020-07-08 | Stop reason: SDUPTHER

## 2019-12-16 RX ORDER — DOCUSATE SODIUM 100 MG/1
100 CAPSULE, LIQUID FILLED ORAL 2 TIMES DAILY
Qty: 60 CAPSULE | Refills: 5 | Status: SHIPPED
Start: 2019-12-16 | End: 2020-05-15 | Stop reason: SDUPTHER

## 2019-12-16 RX ORDER — DIPHENHYDRAMINE HCL 50 MG
50 CAPSULE ORAL NIGHTLY
Qty: 90 CAPSULE | Refills: 0 | Status: SHIPPED
Start: 2019-12-16 | End: 2020-03-04 | Stop reason: SDUPTHER

## 2019-12-16 RX ORDER — AZITHROMYCIN 250 MG/1
250 TABLET, FILM COATED ORAL SEE ADMIN INSTRUCTIONS
Qty: 6 TABLET | Refills: 0 | Status: SHIPPED | OUTPATIENT
Start: 2019-12-16 | End: 2019-12-21

## 2019-12-16 RX ORDER — GABAPENTIN 100 MG/1
200 CAPSULE ORAL 2 TIMES DAILY
Qty: 360 CAPSULE | Refills: 0 | Status: SHIPPED
Start: 2019-12-16 | End: 2020-03-04 | Stop reason: SDUPTHER

## 2019-12-16 RX ORDER — MIRTAZAPINE 30 MG/1
30 TABLET, FILM COATED ORAL NIGHTLY
Qty: 30 TABLET | Refills: 1 | Status: SHIPPED
Start: 2019-12-16 | End: 2020-02-25

## 2019-12-16 RX ORDER — ASPIRIN 81 MG/1
81 TABLET ORAL DAILY
Qty: 90 TABLET | Refills: 1 | Status: SHIPPED
Start: 2019-12-16 | End: 2020-03-04 | Stop reason: SDUPTHER

## 2019-12-16 ASSESSMENT — ENCOUNTER SYMPTOMS: SHORTNESS OF BREATH: 0

## 2019-12-26 ENCOUNTER — OFFICE VISIT (OUTPATIENT)
Dept: FAMILY MEDICINE CLINIC | Age: 59
End: 2019-12-26
Payer: MEDICARE

## 2019-12-26 VITALS
RESPIRATION RATE: 18 BRPM | OXYGEN SATURATION: 92 % | DIASTOLIC BLOOD PRESSURE: 81 MMHG | HEIGHT: 72 IN | HEART RATE: 88 BPM | WEIGHT: 315 LBS | TEMPERATURE: 99.4 F | BODY MASS INDEX: 42.66 KG/M2 | SYSTOLIC BLOOD PRESSURE: 136 MMHG

## 2019-12-26 DIAGNOSIS — E11.69 DIABETES MELLITUS TYPE 2 IN OBESE (HCC): ICD-10-CM

## 2019-12-26 DIAGNOSIS — R05.9 COUGH: ICD-10-CM

## 2019-12-26 DIAGNOSIS — E66.9 DIABETES MELLITUS TYPE 2 IN OBESE (HCC): ICD-10-CM

## 2019-12-26 DIAGNOSIS — Z76.0 MEDICATION REFILL: ICD-10-CM

## 2019-12-26 DIAGNOSIS — J44.1 COPD EXACERBATION (HCC): Primary | ICD-10-CM

## 2019-12-26 PROCEDURE — 99213 OFFICE O/P EST LOW 20 MIN: CPT | Performed by: STUDENT IN AN ORGANIZED HEALTH CARE EDUCATION/TRAINING PROGRAM

## 2019-12-26 PROCEDURE — 99212 OFFICE O/P EST SF 10 MIN: CPT | Performed by: STUDENT IN AN ORGANIZED HEALTH CARE EDUCATION/TRAINING PROGRAM

## 2019-12-26 RX ORDER — FINASTERIDE 5 MG/1
5 TABLET, FILM COATED ORAL DAILY
Qty: 90 TABLET | Refills: 1 | Status: SHIPPED
Start: 2019-12-26 | End: 2020-03-04 | Stop reason: SDUPTHER

## 2019-12-29 RX ORDER — PREDNISONE 20 MG/1
20 TABLET ORAL DAILY
Qty: 3 TABLET | Refills: 0 | Status: SHIPPED | OUTPATIENT
Start: 2019-12-29 | End: 2020-01-01

## 2019-12-29 ASSESSMENT — ENCOUNTER SYMPTOMS
COUGH: 1
WHEEZING: 1
SHORTNESS OF BREATH: 0

## 2020-01-01 ENCOUNTER — OFFICE VISIT (OUTPATIENT)
Dept: FAMILY MEDICINE CLINIC | Age: 60
End: 2020-01-01
Payer: MEDICARE

## 2020-01-01 VITALS
HEART RATE: 80 BPM | TEMPERATURE: 97.7 F | WEIGHT: 315 LBS | BODY MASS INDEX: 42.66 KG/M2 | HEIGHT: 72 IN | OXYGEN SATURATION: 90 % | DIASTOLIC BLOOD PRESSURE: 80 MMHG | SYSTOLIC BLOOD PRESSURE: 147 MMHG

## 2020-01-01 VITALS
HEIGHT: 72 IN | DIASTOLIC BLOOD PRESSURE: 80 MMHG | HEART RATE: 76 BPM | RESPIRATION RATE: 22 BRPM | TEMPERATURE: 97.7 F | SYSTOLIC BLOOD PRESSURE: 131 MMHG | WEIGHT: 315 LBS | BODY MASS INDEX: 42.66 KG/M2 | OXYGEN SATURATION: 92 %

## 2020-01-01 LAB
CHP ED QC CHECK: YES
GLUCOSE BLD-MCNC: 163 MG/DL
HBA1C MFR BLD: 6.9 %

## 2020-01-01 PROCEDURE — 99213 OFFICE O/P EST LOW 20 MIN: CPT | Performed by: STUDENT IN AN ORGANIZED HEALTH CARE EDUCATION/TRAINING PROGRAM

## 2020-01-01 PROCEDURE — 99212 OFFICE O/P EST SF 10 MIN: CPT | Performed by: STUDENT IN AN ORGANIZED HEALTH CARE EDUCATION/TRAINING PROGRAM

## 2020-01-01 PROCEDURE — 82962 GLUCOSE BLOOD TEST: CPT | Performed by: STUDENT IN AN ORGANIZED HEALTH CARE EDUCATION/TRAINING PROGRAM

## 2020-01-01 PROCEDURE — 83036 HEMOGLOBIN GLYCOSYLATED A1C: CPT | Performed by: STUDENT IN AN ORGANIZED HEALTH CARE EDUCATION/TRAINING PROGRAM

## 2020-01-01 RX ORDER — DIPHENHYDRAMINE HCL 50 MG
50 CAPSULE ORAL NIGHTLY
Qty: 90 CAPSULE | Refills: 0 | Status: CANCELLED | OUTPATIENT
Start: 2020-01-01

## 2020-01-01 RX ORDER — MIRTAZAPINE 30 MG/1
TABLET, FILM COATED ORAL
Qty: 60 TABLET | Refills: 3 | Status: SHIPPED
Start: 2020-01-01 | End: 2021-01-01 | Stop reason: SDUPTHER

## 2020-01-01 RX ORDER — ALBUTEROL SULFATE 2.5 MG/3ML
2.5 SOLUTION RESPIRATORY (INHALATION) 4 TIMES DAILY
Qty: 1080 ML | Refills: 1 | Status: SHIPPED
Start: 2020-01-01 | End: 2021-01-01 | Stop reason: SDUPTHER

## 2020-01-01 RX ORDER — GABAPENTIN 100 MG/1
200 CAPSULE ORAL 2 TIMES DAILY
Qty: 360 CAPSULE | Refills: 0 | Status: SHIPPED
Start: 2020-01-01 | End: 2020-01-01

## 2020-01-01 RX ORDER — ALBUTEROL SULFATE 2.5 MG/3ML
SOLUTION RESPIRATORY (INHALATION)
Qty: 120 EACH | Refills: 5 | Status: SHIPPED
Start: 2020-01-01 | End: 2020-01-01 | Stop reason: SDUPTHER

## 2020-01-01 RX ORDER — LOSARTAN POTASSIUM 25 MG/1
25 TABLET ORAL DAILY
Qty: 90 TABLET | Refills: 0 | Status: SHIPPED
Start: 2020-01-01 | End: 2021-01-01 | Stop reason: SDUPTHER

## 2020-01-01 RX ORDER — ATORVASTATIN CALCIUM 80 MG/1
TABLET, FILM COATED ORAL
Qty: 90 TABLET | Refills: 1 | Status: SHIPPED
Start: 2020-01-01 | End: 2021-01-01 | Stop reason: SDUPTHER

## 2020-01-01 RX ORDER — FINASTERIDE 5 MG/1
5 TABLET, FILM COATED ORAL DAILY
Qty: 90 TABLET | Refills: 3 | Status: SHIPPED
Start: 2020-01-01 | End: 2021-01-01 | Stop reason: SDUPTHER

## 2020-01-01 RX ORDER — INSULIN ASPART 100 [IU]/ML
20 INJECTION, SOLUTION INTRAVENOUS; SUBCUTANEOUS DAILY
Qty: 5 CARTRIDGE | Refills: 3 | Status: SHIPPED
Start: 2020-01-01 | End: 2021-01-01 | Stop reason: SDUPTHER

## 2020-01-01 RX ORDER — GABAPENTIN 300 MG/1
300 CAPSULE ORAL 3 TIMES DAILY
Qty: 270 CAPSULE | Refills: 1 | Status: SHIPPED
Start: 2020-01-01 | End: 2021-01-01 | Stop reason: SDUPTHER

## 2020-01-01 RX ORDER — PROMETHAZINE HYDROCHLORIDE 25 MG/1
25 TABLET ORAL EVERY 6 HOURS PRN
Qty: 30 TABLET | Refills: 3 | Status: SHIPPED | OUTPATIENT
Start: 2020-01-01

## 2020-01-01 RX ORDER — PEN NEEDLE, DIABETIC 29 G X1/2"
1 NEEDLE, DISPOSABLE MISCELLANEOUS 3 TIMES DAILY
COMMUNITY
Start: 2020-08-25 | End: 2021-01-01 | Stop reason: SDUPTHER

## 2020-01-01 RX ORDER — DIPHENHYDRAMINE HCL 50 MG
100 CAPSULE ORAL NIGHTLY
Qty: 60 CAPSULE | Refills: 0 | Status: SHIPPED
Start: 2020-01-01 | End: 2021-01-01 | Stop reason: CLARIF

## 2020-01-01 RX ORDER — DIPHENHYDRAMINE HCL 50 MG
50 CAPSULE ORAL NIGHTLY PRN
Qty: 30 CAPSULE | Refills: 2 | Status: SHIPPED | OUTPATIENT
Start: 2020-01-01 | End: 2020-01-01

## 2020-01-01 RX ORDER — TRAZODONE HYDROCHLORIDE 100 MG/1
300 TABLET ORAL NIGHTLY
Qty: 90 TABLET | Refills: 3 | Status: SHIPPED
Start: 2020-01-01 | End: 2020-01-01 | Stop reason: SDUPTHER

## 2020-01-01 RX ORDER — TRAZODONE HYDROCHLORIDE 100 MG/1
400 TABLET ORAL NIGHTLY
Qty: 120 TABLET | Refills: 3 | Status: SHIPPED
Start: 2020-01-01 | End: 2021-01-01 | Stop reason: SDUPTHER

## 2020-01-01 ASSESSMENT — ENCOUNTER SYMPTOMS
SHORTNESS OF BREATH: 0
SHORTNESS OF BREATH: 1
WHEEZING: 0

## 2020-01-06 ENCOUNTER — OFFICE VISIT (OUTPATIENT)
Dept: FAMILY MEDICINE CLINIC | Age: 60
End: 2020-01-06
Payer: MEDICARE

## 2020-01-06 VITALS
DIASTOLIC BLOOD PRESSURE: 75 MMHG | SYSTOLIC BLOOD PRESSURE: 148 MMHG | HEART RATE: 85 BPM | HEIGHT: 72 IN | TEMPERATURE: 98.7 F | WEIGHT: 315 LBS | OXYGEN SATURATION: 96 % | BODY MASS INDEX: 42.66 KG/M2

## 2020-01-06 LAB
CHP ED QC CHECK: NORMAL
GLUCOSE BLD-MCNC: 374 MG/DL

## 2020-01-06 PROCEDURE — 82962 GLUCOSE BLOOD TEST: CPT | Performed by: STUDENT IN AN ORGANIZED HEALTH CARE EDUCATION/TRAINING PROGRAM

## 2020-01-06 PROCEDURE — 99212 OFFICE O/P EST SF 10 MIN: CPT | Performed by: STUDENT IN AN ORGANIZED HEALTH CARE EDUCATION/TRAINING PROGRAM

## 2020-01-06 PROCEDURE — 99213 OFFICE O/P EST LOW 20 MIN: CPT | Performed by: STUDENT IN AN ORGANIZED HEALTH CARE EDUCATION/TRAINING PROGRAM

## 2020-01-06 RX ORDER — DIPHENHYDRAMINE HCL 50 MG
50 CAPSULE ORAL NIGHTLY
Qty: 90 CAPSULE | Refills: 0 | Status: CANCELLED | OUTPATIENT
Start: 2020-01-06

## 2020-01-06 RX ORDER — OMEPRAZOLE 20 MG/1
20 CAPSULE, DELAYED RELEASE ORAL DAILY
Qty: 90 CAPSULE | Refills: 1 | Status: CANCELLED | OUTPATIENT
Start: 2020-01-06

## 2020-01-06 RX ORDER — LOSARTAN POTASSIUM 25 MG/1
25 TABLET ORAL DAILY
Qty: 30 TABLET | Refills: 1 | Status: CANCELLED | OUTPATIENT
Start: 2020-01-06

## 2020-01-06 RX ORDER — TAMSULOSIN HYDROCHLORIDE 0.4 MG/1
CAPSULE ORAL
Qty: 90 CAPSULE | Refills: 1 | Status: CANCELLED | OUTPATIENT
Start: 2020-01-06

## 2020-01-06 RX ORDER — GABAPENTIN 100 MG/1
200 CAPSULE ORAL 2 TIMES DAILY
Qty: 360 CAPSULE | Refills: 0 | Status: CANCELLED | OUTPATIENT
Start: 2020-01-06 | End: 2020-04-05

## 2020-01-06 RX ORDER — ATORVASTATIN CALCIUM 80 MG/1
TABLET, FILM COATED ORAL
Qty: 30 TABLET | Refills: 1 | Status: CANCELLED | OUTPATIENT
Start: 2020-01-06

## 2020-01-06 RX ORDER — ATENOLOL 50 MG/1
TABLET ORAL
Qty: 180 TABLET | Refills: 1 | Status: CANCELLED | OUTPATIENT
Start: 2020-01-06

## 2020-01-06 RX ORDER — FINASTERIDE 5 MG/1
5 TABLET, FILM COATED ORAL DAILY
Qty: 90 TABLET | Refills: 1 | Status: CANCELLED | OUTPATIENT
Start: 2020-01-06

## 2020-01-06 RX ORDER — AMLODIPINE BESYLATE 10 MG/1
TABLET ORAL
Qty: 90 TABLET | Refills: 1 | Status: CANCELLED | OUTPATIENT
Start: 2020-01-06

## 2020-01-06 RX ORDER — TRAZODONE HYDROCHLORIDE 100 MG/1
300 TABLET ORAL NIGHTLY
Qty: 90 TABLET | Refills: 1
Start: 2020-01-06 | End: 2020-02-24 | Stop reason: SDUPTHER

## 2020-01-06 RX ORDER — CALCIPOTRIENE 50 UG/G
CREAM TOPICAL
Qty: 360 G | Refills: 1 | Status: CANCELLED | OUTPATIENT
Start: 2020-01-06

## 2020-01-06 RX ORDER — CHLORAL HYDRATE 500 MG
1000 CAPSULE ORAL DAILY
Qty: 90 CAPSULE | Refills: 1 | Status: CANCELLED | OUTPATIENT
Start: 2020-01-06

## 2020-01-06 RX ORDER — HYDROCHLOROTHIAZIDE 25 MG/1
25 TABLET ORAL DAILY
Qty: 90 TABLET | Refills: 1 | Status: CANCELLED | OUTPATIENT
Start: 2020-01-06

## 2020-01-06 RX ORDER — MIRTAZAPINE 30 MG/1
30 TABLET, FILM COATED ORAL NIGHTLY
Qty: 30 TABLET | Refills: 1 | Status: CANCELLED | OUTPATIENT
Start: 2020-01-06

## 2020-01-06 RX ORDER — TRAZODONE HYDROCHLORIDE 100 MG/1
200 TABLET ORAL NIGHTLY
Qty: 60 TABLET | Refills: 1 | Status: CANCELLED | OUTPATIENT
Start: 2020-01-06

## 2020-01-06 RX ORDER — ASPIRIN 81 MG/1
81 TABLET ORAL DAILY
Qty: 90 TABLET | Refills: 1 | Status: CANCELLED | OUTPATIENT
Start: 2020-01-06

## 2020-01-06 RX ORDER — DOCUSATE SODIUM 100 MG/1
100 CAPSULE, LIQUID FILLED ORAL 2 TIMES DAILY
Qty: 60 CAPSULE | Refills: 5 | Status: CANCELLED | OUTPATIENT
Start: 2020-01-06

## 2020-01-06 NOTE — PROGRESS NOTES
[address] [facility]  Department of Family Medicine  Family Medicine Residency Program      Patient:  Jai Lindsey 61 y.o. male     Date of Service: 1/6/20      Chief complaint:   Chief Complaint   Patient presents with    2 Week Follow-Up         History of Present Illness   The patient is a 61 y.o. male  presented to the clinic with complaints as above. COPD exacerbation:  COPD exacerbation is improving. Only has a mild nonproductive cough now. No more wheezing. Continues on on 4L O2. Completed recent prednisone burst.  Was also treated with Z-Paul. Took combiventt 4 times a day, Qvar twice a day, and albuterol as needed as instructed. Diabetes mellitus type 2:  Patient was recently switched to diabetes 75/25 due to affordability never picked it up. He has continued on his 42 units of Lantus twice daily. He was recently given some free samples of Basaglar in the office. According to the pharmacy which was called today cheapest option for him would be Novolin 70/30. PastMedical History:      Diagnosis Date    Anxiety     Asthma     CAD (coronary artery disease) 2008    2 stents in 2008 .  CHF (congestive heart failure) (HCC)     Chills     Chronic sinusitis     [facility] [address]     COPD (chronic obstructive pulmonary disease) (HCC)     Depression     Diabetes mellitus (HCC)     on insulin    GERD (gastroesophageal reflux disease)     H/O cardiovascular stress test     H/O chest x-ray     Hand fracture 3/2010    Fractured both hands.  Hepatitis C     Work up was negative. Saw specialist     History of tobacco abuse     Hyperlipidemia     Hypertension     Night sweats     NSTEMI (non-ST elevation myocardial infarction) ([facility] [address] 3/2010    Hospitalized.  Obesity     Oxygen dependent     Panic attacks     Pneumonia 3/2010     LLL.     Sleep apnea     Has CPAP machine    SOB (shortness of breath)     does not know settings       Past Surgical History: file   Social History Narrative    Not on file        Family History:       Problem Relation Age of Onset    Heart Failure Father     Diabetes Mother     Heart Surgery Brother        Review of Systems:   Review of Systems   Constitutional: Negative for fever. Respiratory: Negative for shortness of breath and wheezing. Cardiovascular: Negative for chest pain. Genitourinary: Negative for dysuria. Physical Exam   Vitals: BP (!) 148/75 (Site: Left Upper Arm, Position: Sitting, Cuff Size: Medium Adult)   Pulse 85   Temp 98.7 °F (37.1 °C) (Oral)   Ht 6' (1.829 m)   Wt (!) 359 lb (162.8 kg)   SpO2 96%   BMI 48.69 kg/m²   General Appearance: Well developed, awake,alert, oriented, no acute distress  HEENT: Normocephalic, atraumatic. Chest wall/Lung:  Respirations unlabored.  CTAB/L. On 4 L O2.  Very mild bilateral intermittent wheezing which improved with cough. No crackles. Heart: Regular rate andrhythm, S1 and S2 normal, no murmur, rub or gallop. Abdomen: Soft, non-tender, bowel sounds normoactive, no masses, no organomegaly  Extremities:  Extremities normal, atraumatic, no cyanosis.  Trace edema B/L. Neurologic:   Alert&Oriented.          Psychiatric: has a normal mood and affect. Behavior is normal.       Assessment and Plan       Diabetes mellitus type 2 in [facility])  -continue with current treatment; determine lowest cost and make adjustment as needed; will contact pt once pricing is determined; work on lifestyle. encourage to check BS   - POCT Glucose    COPD exacerbation ([facility] [address]  -Much Improved  -go back to combivent BID; continue albuterol, qvar as prescribed. Medication refill  - traZODone (DESYREL) 100 MG tablet; Take 3 tablets by mouth nightly  Dispense: 90 tablet; Refill: 1        Return to Office: Return in about 1 month (around 2/6/2020) for DM.       Medication List:    Current Outpatient Medications   Medication Sig Dispense Refill    beclomethasone (QVAR) 80 MCG/ACT inhaler

## 2020-01-09 ENCOUNTER — HOSPITAL ENCOUNTER (OUTPATIENT)
Age: 60
Discharge: HOME | End: 2020-01-09
Payer: MEDICARE

## 2020-01-09 LAB
ALBUMIN SERPL-MCNC: 3.8 G/DL (ref 3.5–5.2)
ALP BLD-CCNC: 67 U/L (ref 40–129)
ALT SERPL-CCNC: 23 U/L (ref 0–40)
ANION GAP SERPL CALCULATED.3IONS-SCNC: 18 MMOL/L (ref 7–16)
AST SERPL-CCNC: 16 U/L (ref 0–39)
BACTERIA: NORMAL /HPF
BILIRUB SERPL-MCNC: 0.2 MG/DL (ref 0–1.2)
BILIRUBIN URINE: NEGATIVE
BLOOD, URINE: NEGATIVE
BUN BLDV-MCNC: 24 MG/DL (ref 6–20)
CALCIUM SERPL-MCNC: 9.7 MG/DL (ref 8.6–10.2)
CHLORIDE BLD-SCNC: 98 MMOL/L (ref 98–107)
CHOLESTEROL, TOTAL: 161 MG/DL (ref 0–199)
CLARITY: CLEAR
CO2: 24 MMOL/L (ref 22–29)
COLOR: YELLOW
CREAT SERPL-MCNC: 1.4 MG/DL (ref 0.7–1.2)
CREATININE URINE: 114 MG/DL (ref 40–278)
EPITHELIAL CELLS, UA: NORMAL /HPF
GFR AFRICAN AMERICAN: >60
GFR NON-AFRICAN AMERICAN: 52 ML/MIN/1.73
GLUCOSE BLD-MCNC: 324 MG/DL (ref 74–99)
GLUCOSE URINE: 500 MG/DL
HBA1C MFR BLD: 10.5 % (ref 4–5.6)
HCT VFR BLD CALC: 44.9 % (ref 37–54)
HDLC SERPL-MCNC: 39 MG/DL
HEMOGLOBIN: 13.9 G/DL (ref 12.5–16.5)
KETONES, URINE: NEGATIVE MG/DL
LDL CHOLESTEROL CALCULATED: 49 MG/DL (ref 0–99)
LEUKOCYTE ESTERASE, URINE: NEGATIVE
MAGNESIUM: 1.4 MG/DL (ref 1.6–2.6)
MCH RBC QN AUTO: 27.4 PG (ref 26–35)
MCHC RBC AUTO-ENTMCNC: 31 % (ref 32–34.5)
MCV RBC AUTO: 88.4 FL (ref 80–99.9)
MICROALBUMIN UR-MCNC: 74.1 MG/L
MICROALBUMIN/CREAT UR-RTO: 65 (ref 0–30)
NITRITE, URINE: NEGATIVE
PARATHYROID HORMONE INTACT: 38 PG/ML (ref 15–65)
PDW BLD-RTO: 13.4 FL (ref 11.5–15)
PH UA: 5.5 (ref 5–9)
PHOSPHORUS: 3.5 MG/DL (ref 2.5–4.5)
PLATELET # BLD: 250 E9/L (ref 130–450)
PMV BLD AUTO: 10.9 FL (ref 7–12)
POTASSIUM SERPL-SCNC: 5.4 MMOL/L (ref 3.5–5)
PROSTATE SPECIFIC ANTIGEN: 0.36 NG/ML (ref 0–4)
PROTEIN PROTEIN: 24 MG/DL (ref 0–12)
PROTEIN UA: ABNORMAL MG/DL
PROTEIN/CREAT RATIO: 0.2
PROTEIN/CREAT RATIO: 0.2 (ref 0–0.2)
RBC # BLD: 5.08 E12/L (ref 3.8–5.8)
RBC UA: NORMAL /HPF (ref 0–2)
SEDIMENTATION RATE, ERYTHROCYTE: 30 MM/HR (ref 0–15)
SODIUM BLD-SCNC: 140 MMOL/L (ref 132–146)
SPECIFIC GRAVITY UA: 1.02 (ref 1–1.03)
TOTAL PROTEIN: 6.9 G/DL (ref 6.4–8.3)
TRIGL SERPL-MCNC: 367 MG/DL (ref 0–149)
TSH SERPL DL<=0.05 MIU/L-ACNC: 1.93 UIU/ML (ref 0.27–4.2)
URIC ACID, SERUM: 6.4 MG/DL (ref 3.4–7)
UROBILINOGEN, URINE: 0.2 E.U./DL
VITAMIN D 25-HYDROXY: 27 NG/ML (ref 30–100)
VLDLC SERPL CALC-MCNC: 73 MG/DL
WBC # BLD: 16.4 E9/L (ref 4.5–11.5)
WBC UA: NORMAL /HPF (ref 0–5)

## 2020-01-09 PROCEDURE — 83735 ASSAY OF MAGNESIUM: CPT

## 2020-01-09 PROCEDURE — 83970 ASSAY OF PARATHORMONE: CPT

## 2020-01-09 PROCEDURE — 80061 LIPID PANEL: CPT

## 2020-01-09 PROCEDURE — 83036 HEMOGLOBIN GLYCOSYLATED A1C: CPT

## 2020-01-09 PROCEDURE — 82044 UR ALBUMIN SEMIQUANTITATIVE: CPT

## 2020-01-09 PROCEDURE — 81001 URINALYSIS AUTO W/SCOPE: CPT

## 2020-01-09 PROCEDURE — 82306 VITAMIN D 25 HYDROXY: CPT

## 2020-01-09 PROCEDURE — 36415 COLL VENOUS BLD VENIPUNCTURE: CPT

## 2020-01-09 PROCEDURE — G0103 PSA SCREENING: HCPCS

## 2020-01-09 PROCEDURE — 85651 RBC SED RATE NONAUTOMATED: CPT

## 2020-01-09 PROCEDURE — 84550 ASSAY OF BLOOD/URIC ACID: CPT

## 2020-01-09 PROCEDURE — 82570 ASSAY OF URINE CREATININE: CPT

## 2020-01-09 PROCEDURE — 80053 COMPREHEN METABOLIC PANEL: CPT

## 2020-01-09 PROCEDURE — 84156 ASSAY OF PROTEIN URINE: CPT

## 2020-01-09 PROCEDURE — 84443 ASSAY THYROID STIM HORMONE: CPT

## 2020-01-09 PROCEDURE — 84100 ASSAY OF PHOSPHORUS: CPT

## 2020-01-09 PROCEDURE — 85027 COMPLETE CBC AUTOMATED: CPT

## 2020-01-12 ASSESSMENT — ENCOUNTER SYMPTOMS
WHEEZING: 0
SHORTNESS OF BREATH: 0

## 2020-01-14 ENCOUNTER — TELEPHONE (OUTPATIENT)
Dept: CARDIOLOGY CLINIC | Age: 60
End: 2020-01-14

## 2020-01-14 NOTE — TELEPHONE ENCOUNTER
Patient called to rescheduled his 1/31/2020 appointment, over due annual visit. I advised him to make sure that he comes into the office to be seen on an annual visit.   He needs to stay on his cardiac medications and it is imperative that he is seen once a year by his cardiologist.    He verbalized understanding

## 2020-02-14 ENCOUNTER — OFFICE VISIT (OUTPATIENT)
Dept: CARDIOLOGY CLINIC | Age: 60
End: 2020-02-14
Payer: MEDICARE

## 2020-02-14 VITALS
DIASTOLIC BLOOD PRESSURE: 64 MMHG | HEART RATE: 76 BPM | OXYGEN SATURATION: 95 % | BODY MASS INDEX: 42.66 KG/M2 | WEIGHT: 315 LBS | SYSTOLIC BLOOD PRESSURE: 116 MMHG | HEIGHT: 72 IN | RESPIRATION RATE: 26 BRPM

## 2020-02-14 PROCEDURE — 93000 ELECTROCARDIOGRAM COMPLETE: CPT | Performed by: INTERNAL MEDICINE

## 2020-02-14 PROCEDURE — 99213 OFFICE O/P EST LOW 20 MIN: CPT | Performed by: INTERNAL MEDICINE

## 2020-02-14 ASSESSMENT — ENCOUNTER SYMPTOMS
DIARRHEA: 0
COUGH: 1
SHORTNESS OF BREATH: 0
BLOOD IN STOOL: 0
CONSTIPATION: 0
WHEEZING: 0
ABDOMINAL PAIN: 0
VOMITING: 0
NAUSEA: 0
BACK PAIN: 0

## 2020-02-14 NOTE — PROGRESS NOTES
disease)     2 stents in  .  CH[facility]congest[facility] failure) ([facility])     Chills     Chronic sinusitis     [facility]aine abuse ([facility]     COPD (chronic obstructive pulmonary disease) ([facility])     Depression     Diabetes mellitus (HCC)     on insulin    GERD (gastroesophageal reflux disease)     H/O cardiovascular stress test     H/O chest x-ray     Hand fracture 3/2010    Fractured both hands.  Hepatitis C     Work up was negative. Saw specialist     History of tobacco abuse     Hyperlipidemia     Hypertension     Night sweats     NSTEMI (non-ST elevation myocardial infarction) ([facility] 3/2010    Hospitalized.  Obesity     Oxygen dependent     Panic attacks     Pneumonia 3/2010     LLL.  Sleep apnea     Has CPAP machine    SOB (shortness of breath)     does not know settings       Past Surgical History:  Past Surgical History:   Procedure Laterality Date    COLONOSCOPY      COLONOSCOPY  9/23/15    DIAGNOSTIC CARDIAC CATH LAB PROCEDURE  2009    [facility], cardiac cath/primary BM stent in proximal LAD.     OTHER SURGICAL HISTORY      anal fistula    TONSILLECTOMY         Family History:  Family History   Problem Relation Age of Onset    Heart Failure Father     Diabetes Mother     Heart Surgery Brother        Social History:  Social History     Socioeconomic History    Marital status: Single     Spouse name: Not on file    Number of children: Not on file    Years of education: 15    Highest education level: Not on file   Occupational History    Occupation: disabled-   Social Needs    Financial resource strain: Not on file    Food insecurity:     Worry: Not on file     Inability: Not on file    Transportation needs:     Medical: Not on file     Non-medical: Not on file   Tobacco Use    Smoking status: Former Smoker     Packs/day: 2.00     Years: 38.00     Pack years: 76.00     Types: Cigarettes     Last attempt to quit: 2008     Years since quittin.5    daily 30 tablet 1    losartan (COZAAR) 25 MG tablet Take 1 tablet by mouth daily 30 tablet 1    metFORMIN (GLUCOPHAGE) 1000 MG tablet take 1 tablet by mouth twice a day with meals 180 tablet 1    omeprazole (PRILOSEC) 20 MG delayed release capsule Take 1 capsule by mouth daily 90 capsule 1    hydrochlorothiazide (HYDRODIURIL) 25 MG tablet Take 1 tablet by mouth daily 90 tablet 1    amLODIPine (NORVASC) 10 MG tablet take 1 tablet by mouth once daily 90 tablet 1    atenolol (TENORMIN) 50 MG tablet take 1 tablet by mouth twice a day 180 tablet 1    Insulin Pen Needle 31G X 8 MM MISC 1 each by Does not apply route daily 100 each 3    tamsulosin (FLOMAX) 0.4 MG capsule take 1 capsule by mouth once daily 90 capsule 1    aspirin 81 MG EC tablet Take 1 tablet by mouth daily 90 tablet 1    calcipotriene (DOVONEX) 0.005 % cream APPLY 1-2 GRAMS TOPICALLY TO AFFECTED AREA 1-2 TIMES A DAY. DO NOT APPLY TO FACE 360 g 1    Lancets MISC Check sugars as directed. [facility] [place] for pharmacy to substitute 100 each 5    Omega-3 Fatty Acids (FISH OIL) 1000 MG CAPS Take 1 capsule by mouth daily 90 capsule 1    insulin 70-30 (NOVOLIN 70/30) (70-30) 100 UNIT per ML injection vial Inject 32 Units into the skin 2 times daily 1 vial 3    blood glucose monitor strips Test 3 times a day & as needed for symptoms of irregular blood glucose. 100 strip 1    lidocaine (LIDODERM) 5 % Place 1 patch onto the skin daily 12 hours on, 12 hours off. (Patient taking differently: Place 1 patch onto the skin daily 12 hours on, 12 hours off. Patient has not received king 10/12/18) 30 patch 1    albuterol (PROVENTIL) (2.5 MG/3ML) 0.083% nebulizer solution inhale contents of 1 vial in nebulizer four times a day 120 each 5    glucose blood VI test strips (ASCENSIA AUTODISC VI;ONE TOUCH ULTRA TEST VI) strip Check each morning.  [facility] [place] for Pharmacy to substitute 100 strip 5    promethazine (PHENERGAN) 25 MG tablet Take 25 mg by mouth every 6 hours as needed ALKPHOS 67 01/09/2020    BILITOT 0.2 01/09/2020     Lab Results   Component Value Date    CKTOTAL 106 01/03/2016    CKMB 1.5 01/03/2016    TROPONINI <0.01 01/04/2016    TROPONINI <0.01 01/04/2016    TROPONINI <0.01 01/03/2016     Lab Results   Component Value Date    INR 1.2 02/22/2014    INR 1.2 10/02/2013    PROTIME 13.0 02/22/2014    PROTIME 13.0 (H) 10/02/2013     Lab Results   Component Value Date    TSH 1.930 01/09/2020     Lab Results   Component Value Date    LABA1C 10.5 (H) 01/09/2020     No results found for: EAG  Lab Results   Component Value Date    CHOL 161 01/09/2020    CHOL 192 07/19/2019    CHOL 183 01/29/2018     Lab Results   Component Value Date    TRIG 367 (H) 01/09/2020    TRIG 519 (H) 07/19/2019    TRIG 362 (H) 01/29/2018     Lab Results   Component Value Date    HDL 39 01/09/2020    HDL 27 07/19/2019    HDL 33 01/29/2018     Lab Results   Component Value Date    LDLCALC 49 01/09/2020    [facility] - () 07/19/2019    LDLCALC 78 01/29/2018     Lab Results   Component Value Date    LABVLDL 73 01/09/2020    LABVLDL - (AA) 07/19/2019    LABVLDL 72 01/29/2018     Cardiac Tests:      Echocardiogram: Done in April 2016 was technically very difficult. Cardiac valves were not well seen. Ejection fraction was 65%. ASSESSMENT / PLAN:  -Chronic atypical chest pain: Probably due to GERD. -CAD: Status post PCI to LAD in 2009. -Grade 2 diastolic dysfunction: Compensated. -Hypertension: Controlled. -Bifascicular block. -Hyperlipidemia: On high-dose atorvastatin. -COPD oxygen dependent.  -Obstructive sleep apnea treated with CPAP at night.  -Chronic kidney disease.  -Diabetes. -GERD. -Depression.  -History of hepatitis C antibody positive. -Morbid obesity. We will continue current cardiac medications. Patient was advised to lose weight and to keep using his CPAP at night. We will follow-up at the office in 1 year.          The patient's current medication list, allergies, problem list and results of all previously ordered testing were reviewed at today's visit. Samantha Mahan MD , [facility].   [facility]) Cardiology

## 2020-02-24 RX ORDER — TRAZODONE HYDROCHLORIDE 100 MG/1
300 TABLET ORAL NIGHTLY
Qty: 90 TABLET | Refills: 1 | Status: SHIPPED
Start: 2020-02-24 | End: 2020-04-20 | Stop reason: SDUPTHER

## 2020-02-24 NOTE — TELEPHONE ENCOUNTER
Patient called today requesting this refill, he is apparently out of refills because he needs this filled today.

## 2020-02-25 RX ORDER — MIRTAZAPINE 30 MG/1
TABLET, FILM COATED ORAL
Qty: 60 TABLET | Refills: 1 | Status: SHIPPED
Start: 2020-02-25 | End: 2020-06-12 | Stop reason: SDUPTHER

## 2020-02-25 NOTE — TELEPHONE ENCOUNTER
Last Appointment:  1/6/2020  Future Appointments   Date Time Provider Alex Anders   3/4/2020  2:40 PM [facility]

## 2020-02-26 RX ORDER — OMEPRAZOLE 20 MG/1
20 CAPSULE, DELAYED RELEASE ORAL DAILY
Qty: 90 CAPSULE | Refills: 1 | Status: SHIPPED
Start: 2020-02-26 | End: 2020-08-25 | Stop reason: SDUPTHER

## 2020-02-26 RX ORDER — MIRTAZAPINE 30 MG/1
30 TABLET, FILM COATED ORAL NIGHTLY
Qty: 30 TABLET | Refills: 1 | Status: CANCELLED | OUTPATIENT
Start: 2020-02-26

## 2020-03-02 RX ORDER — LOSARTAN POTASSIUM 25 MG/1
25 TABLET ORAL DAILY
Qty: 90 TABLET | Refills: 0 | Status: SHIPPED
Start: 2020-03-02 | End: 2020-06-02 | Stop reason: SDUPTHER

## 2020-03-04 ENCOUNTER — OFFICE VISIT (OUTPATIENT)
Dept: FAMILY MEDICINE CLINIC | Age: 60
End: 2020-03-04
Payer: MEDICARE

## 2020-03-04 VITALS
TEMPERATURE: 97.9 F | SYSTOLIC BLOOD PRESSURE: 135 MMHG | HEIGHT: 72 IN | BODY MASS INDEX: 42.66 KG/M2 | WEIGHT: 315 LBS | OXYGEN SATURATION: 95 % | HEART RATE: 82 BPM | DIASTOLIC BLOOD PRESSURE: 77 MMHG

## 2020-03-04 LAB
CHP ED QC CHECK: NORMAL
GLUCOSE BLD-MCNC: 359 MG/DL

## 2020-03-04 PROCEDURE — 82962 GLUCOSE BLOOD TEST: CPT | Performed by: STUDENT IN AN ORGANIZED HEALTH CARE EDUCATION/TRAINING PROGRAM

## 2020-03-04 PROCEDURE — 99212 OFFICE O/P EST SF 10 MIN: CPT | Performed by: STUDENT IN AN ORGANIZED HEALTH CARE EDUCATION/TRAINING PROGRAM

## 2020-03-04 PROCEDURE — 99213 OFFICE O/P EST LOW 20 MIN: CPT | Performed by: STUDENT IN AN ORGANIZED HEALTH CARE EDUCATION/TRAINING PROGRAM

## 2020-03-04 RX ORDER — HYDROCHLOROTHIAZIDE 25 MG/1
25 TABLET ORAL DAILY
Qty: 90 TABLET | Refills: 1 | Status: SHIPPED
Start: 2020-03-04 | End: 2020-08-03 | Stop reason: SDUPTHER

## 2020-03-04 RX ORDER — ASPIRIN 81 MG/1
81 TABLET ORAL DAILY
Qty: 90 TABLET | Refills: 1 | Status: SHIPPED
Start: 2020-03-04 | End: 2020-08-25 | Stop reason: SDUPTHER

## 2020-03-04 RX ORDER — DIPHENHYDRAMINE HCL 50 MG
50 CAPSULE ORAL NIGHTLY
Qty: 90 CAPSULE | Refills: 0 | Status: SHIPPED
Start: 2020-03-04 | End: 2020-04-21 | Stop reason: SDUPTHER

## 2020-03-04 RX ORDER — ATORVASTATIN CALCIUM 80 MG/1
TABLET, FILM COATED ORAL
Qty: 30 TABLET | Refills: 1 | Status: SHIPPED
Start: 2020-03-04 | End: 2020-04-17 | Stop reason: SDUPTHER

## 2020-03-04 RX ORDER — AMLODIPINE BESYLATE 10 MG/1
TABLET ORAL
Qty: 90 TABLET | Refills: 1 | Status: SHIPPED
Start: 2020-03-04 | End: 2020-08-25 | Stop reason: SDUPTHER

## 2020-03-04 RX ORDER — BLOOD-GLUCOSE METER
1 KIT MISCELLANEOUS DAILY
Qty: 1 KIT | Refills: 0 | Status: SHIPPED | OUTPATIENT
Start: 2020-03-04 | End: 2021-01-01

## 2020-03-04 RX ORDER — GABAPENTIN 100 MG/1
200 CAPSULE ORAL 2 TIMES DAILY
Qty: 360 CAPSULE | Refills: 0 | Status: SHIPPED
Start: 2020-03-04 | End: 2020-07-07 | Stop reason: SDUPTHER

## 2020-03-04 RX ORDER — FINASTERIDE 5 MG/1
5 TABLET, FILM COATED ORAL DAILY
Qty: 90 TABLET | Refills: 1 | Status: SHIPPED
Start: 2020-03-04 | End: 2020-08-25 | Stop reason: SDUPTHER

## 2020-03-04 RX ORDER — CHLORAL HYDRATE 500 MG
1000 CAPSULE ORAL DAILY
Qty: 90 CAPSULE | Refills: 1 | Status: SHIPPED
Start: 2020-03-04 | End: 2020-08-25 | Stop reason: SDUPTHER

## 2020-03-04 ASSESSMENT — ENCOUNTER SYMPTOMS: SHORTNESS OF BREATH: 0

## 2020-03-04 NOTE — PROGRESS NOTES
S: 61 y.o. male presents today for follow-up DM. Has not taken insulin in 2 months, did run out. Lost 10 lbs since last visit. O: VS: /77 (Site: Left Upper Arm, Position: Sitting, Cuff Size: Large Adult)   Pulse 82   Temp 97.9 °F (36.6 °C) (Oral)   Ht 6' (1.829 m)   Wt (!) 347 lb (157.4 kg)   SpO2 95%   BMI 47.06 kg/m²   AAO/NAD, appropriate affect for mood  CV:  RRR, no murmur  Resp: CTAB  Ext- DPP-B, no clubbing, cyanosis, edema    Impression/Plan:   1) DM- 70/30 32 units BID, rto 2 weeks       Attending Physician Statement  I have discussed the case, including pertinent history and exam findings with the resident. I agree with the documented assessment and plan.       Margie Rosenthal, DO

## 2020-03-04 NOTE — PROGRESS NOTES
[address] [facility]  Department of Family Medicine  Family Medicine Residency Program      Patient:  Lakeisha Gross 61 y.o. male     Date of Service: 3/4/20      Chief complaint:   Chief Complaint   Patient presents with    Diabetes     F/U         History of Present Illness   The patient is a 61 y.o. male  presented to the clinic  for f/u up of diabetes. Ran out of insulin 2 months ago has not taken any. States he was accepted by the indigent program to get his insulin covered. Insulin will be delivered to this office. Continues to take ARB, statin, and ASA. Not measuring glucose at home he does not have a machine. Would like Accu-Chek glucometer ordered. Of note patient was up to 42 units of Basaglar twice daily. Humulin 70/30 have been ordered, but he never started this. PastMedical History:      Diagnosis Date    Anxiety     Asthma     CAD (coronary artery disease) 2008    2 stents in 2008 .  [facility]     Chills     Chronic sinusitis     Cocaine abuse ([address]     COPD (chronic obstructive pulmonary disease) [facility]     Depression     Diabetes mellitus (HCC)     on insulin    GERD (gastroesophageal reflux disease)     H/O cardiovascular stress test     H/O chest x-ray     Hand fracture 3/2010    Fractured both hands.  Hepatitis C     Work up was negative. Saw specialist     History of tobacco abuse     Hyperlipidemia     Hypertension     Night sweats     NSTEMI (non-ST elevation myocardial infarction) ([address] 3/2010    Hospitalized.  Obesity     Oxygen dependent     Panic attacks     Pneumonia 3/2010     LLL.  Sleep apnea     Has CPAP machine    SOB (shortness of breath)     does not know settings       Past Surgical History:        Procedure Laterality Date    COLONOSCOPY  2011    COLONOSCOPY  9/23/15   Robbie Martínez DIAGNOSTIC CARDIAC CATH LAB PROCEDURE  2/25/2009    [facility], cardiac cath/primary BM stent in proximal LAD.     OTHER SURGICAL HISTORY      anal fistula    TONSILLECTOMY         Allergies:    Amoxicillin and Penicillins    Social History:   Social History     Socioeconomic History    Marital status: Single     Spouse name: Not on file    Number of children: Not on file    Years of education: 15    Highest education level: Not on file   Occupational History    Occupation: disabled-   Social Needs    Financial resource strain: Not on file    Food insecurity:     Worry: Not on file     Inability: Not on file    Transportation needs:     Medical: Not on file     Non-medical: Not on file   Tobacco Use    Smoking status: Former Smoker     Packs/day: 2.00     Years: 38.00     Pack years: 76.00     Types: Cigarettes     Last attempt to quit: 2008     Years since quittin.5    Smokeless tobacco: Never Used   Substance and Sexual Activity    Alcohol use: No     Alcohol/week: 0.0 standard drinks     Comment: was heavy drinker; quit age 35;  drinks 1-2 cups of coffee daily    Drug use: No     Comment: Acid, marijuana, cocaine, STOPPED ALL DRUGS     Sexual activity: Not Currently   Lifestyle    Physical activity:     Days per week: Not on file     Minutes per session: Not on file    Stress: Not on file   Relationships    Social connections:     Talks on phone: Not on file     Gets together: Not on file     Attends Orthodox service: Not on file     Active member of club or organization: Not on file     Attends meetings of clubs or organizations: Not on file     Relationship status: Not on file    Intimate partner violence:     Fear of current or ex partner: Not on file     Emotionally abused: Not on file     Physically abused: Not on file     Forced sexual activity: Not on file   Other Topics Concern    Not on file   Social History Narrative    Not on file        Family History:       Problem Relation Age of Onset    Heart Failure Father     Diabetes Mother     Heart Surgery Brother        Review of Systems:   Review of Systems   Constitutional: Negative for fever. Respiratory: Negative for shortness of breath. On 4L O2   Cardiovascular: Negative for chest pain. Genitourinary: Negative for dysuria. Physical Exam   Vitals: /77 (Site: Left Upper Arm, Position: Sitting, Cuff Size: Large Adult)   Pulse 82   Temp 97.9 °F (36.6 °C) (Oral)   Ht 6' (1.829 m)   Wt (!) 347 lb (157.4 kg)   SpO2 95%   BMI 47.06 kg/m²   General Appearance: Well developed, awake,alert, oriented, no acute distress  HEENT: Normocephalic, atraumatic. Chest wall/Lung:  Respirations unlabored.  CTAB/L. On 4 L O2.  No crackles. Heart: Regular rate andrhythm, S1 and S2 normal, no murmur, rub or gallop. Abdomen: Soft, non-tender, bowel sounds normoactive, no masses, no organomegaly  Extremities:  Extremities normal, atraumatic, no cyanosis.  Trace edema B/L. Neurologic:   Alert&Oriented.          Psychiatric: has a normal mood and affect. Behavior is normal.       Assessment and Plan       Diabetes mellitus type 2 in obese (HCC)  -No insulin for 2 months. Insulin will be covered by the indigent program, he will be delivered to the clinic. He will be started on 32 units twice daily Novolin 70/30. Glucometer ordered. - gabapentin (NEURONTIN) 100 MG capsule; Take 2 capsules by mouth 2 times daily for 90 days. Dispense: 360 capsule; Refill: 0  - POCT Glucose-359  - atorvastatin (LIPITOR) 80 MG tablet; take 1 tablet by mouth once daily  Dispense: 30 tablet; Refill: 1  - metFORMIN (GLUCOPHAGE) 1000 MG tablet; take 1 tablet by mouth twice a day with meals  Dispense: 180 tablet; Refill: 1  - glucose monitoring kit (FREESTYLE) monitoring kit; 1 kit by Does not apply route daily  Dispense: 1 kit; Refill: 0         Medication refill  - finasteride (PROSCAR) 5 MG tablet; Take 1 tablet by mouth daily  Dispense: 90 tablet; Refill: 1  - diphenhydrAMINE (BENADRYL) 50 MG capsule;  Take 1 capsule by mouth nightly  Dispense: 90 capsule; Refill: 0  - hydroCHLOROthiazide (HYDRODIURIL) 25 MG tablet; Take 1 tablet by mouth daily  Dispense: 90 tablet; Refill: 1  - amLODIPine (NORVASC) 10 MG tablet; take 1 tablet by mouth once daily  Dispense: 90 tablet; Refill: 1  - aspirin 81 MG EC tablet; Take 1 tablet by mouth daily  Dispense: 90 tablet; Refill: 1  - Omega-3 Fatty Acids (FISH OIL) 1000 MG CAPS; Take 1 capsule by mouth daily  Dispense: 90 capsule; Refill: 1    Return to Office: Return in about 2 weeks (around 3/18/2020) for Diabetes. Medication List:    Current Outpatient Medications   Medication Sig Dispense Refill    losartan (COZAAR) 25 MG tablet Take 1 tablet by mouth daily 90 tablet 0    omeprazole (PRILOSEC) 20 MG delayed release capsule Take 1 capsule by mouth daily 90 capsule 1    mirtazapine (REMERON) 30 MG tablet take 1 tablet by mouth every evening 60 tablet 1    traZODone (DESYREL) 100 MG tablet Take 3 tablets by mouth nightly 90 tablet 1    beclomethasone (QVAR) 80 MCG/ACT inhaler Inhale 2 puffs into the lungs 2 times daily      finasteride (PROSCAR) 5 MG tablet Take 1 tablet by mouth daily 90 tablet 1    gabapentin (NEURONTIN) 100 MG capsule Take 2 capsules by mouth 2 times daily for 90 days.  360 capsule 0    diphenhydrAMINE (BENADRYL) 50 MG capsule Take 1 capsule by mouth nightly 90 capsule 0    docusate sodium (DOCQLACE) 100 MG capsule Take 1 capsule by mouth 2 times daily 60 capsule 5    atorvastatin (LIPITOR) 80 MG tablet take 1 tablet by mouth once daily 30 tablet 1    metFORMIN (GLUCOPHAGE) 1000 MG tablet take 1 tablet by mouth twice a day with meals 180 tablet 1    hydrochlorothiazide (HYDRODIURIL) 25 MG tablet Take 1 tablet by mouth daily 90 tablet 1    amLODIPine (NORVASC) 10 MG tablet take 1 tablet by mouth once daily 90 tablet 1    atenolol (TENORMIN) 50 MG tablet take 1 tablet by mouth twice a day 180 tablet 1    Insulin Pen Needle 31G X 8 MM MISC 1 each by Does not apply route daily 100 each 3 or spelling errors may occur.

## 2020-03-25 PROBLEM — K21.9 GERD (GASTROESOPHAGEAL REFLUX DISEASE): Status: RESOLVED | Noted: 2020-03-25 | Resolved: 2020-03-24

## 2020-04-17 RX ORDER — ATORVASTATIN CALCIUM 80 MG/1
TABLET, FILM COATED ORAL
Qty: 90 TABLET | Refills: 0 | Status: SHIPPED
Start: 2020-04-17 | End: 2020-08-03 | Stop reason: SDUPTHER

## 2020-04-20 RX ORDER — TRAZODONE HYDROCHLORIDE 100 MG/1
300 TABLET ORAL NIGHTLY
Qty: 90 TABLET | Refills: 1 | Status: SHIPPED
Start: 2020-04-20 | End: 2020-06-16 | Stop reason: SDUPTHER

## 2020-04-21 RX ORDER — DIPHENHYDRAMINE HCL 50 MG
50 CAPSULE ORAL NIGHTLY
Qty: 90 CAPSULE | Refills: 0 | Status: SHIPPED
Start: 2020-04-21 | End: 2020-05-15 | Stop reason: SDUPTHER

## 2020-05-14 ENCOUNTER — TELEPHONE (OUTPATIENT)
Dept: ADMINISTRATIVE | Age: 60
End: 2020-05-14

## 2020-05-15 ENCOUNTER — HOSPITAL ENCOUNTER (OUTPATIENT)
Age: 60
Discharge: HOME | End: 2020-05-17
Payer: MEDICARE

## 2020-05-15 ENCOUNTER — OFFICE VISIT (OUTPATIENT)
Dept: FAMILY MEDICINE CLINIC | Age: 60
End: 2020-05-15
Payer: MEDICARE

## 2020-05-15 VITALS
TEMPERATURE: 98.5 F | HEART RATE: 80 BPM | RESPIRATION RATE: 22 BRPM | HEIGHT: 72 IN | BODY MASS INDEX: 42.66 KG/M2 | SYSTOLIC BLOOD PRESSURE: 122 MMHG | WEIGHT: 315 LBS | OXYGEN SATURATION: 93 % | DIASTOLIC BLOOD PRESSURE: 64 MMHG

## 2020-05-15 PROBLEM — I25.10 CAD (CORONARY ARTERY DISEASE): Status: ACTIVE | Noted: 2020-05-15

## 2020-05-15 PROBLEM — G47.09 OTHER INSOMNIA: Status: ACTIVE | Noted: 2020-05-15

## 2020-05-15 LAB
ANION GAP SERPL CALCULATED.3IONS-SCNC: 21 MMOL/L (ref 7–16)
BUN BLDV-MCNC: 21 MG/DL (ref 6–20)
CALCIUM SERPL-MCNC: 9.9 MG/DL (ref 8.6–10.2)
CHLORIDE BLD-SCNC: 94 MMOL/L (ref 98–107)
CHP ED QC CHECK: NORMAL
CO2: 23 MMOL/L (ref 22–29)
CREAT SERPL-MCNC: 1.5 MG/DL (ref 0.7–1.2)
GFR AFRICAN AMERICAN: 58
GFR NON-AFRICAN AMERICAN: 48 ML/MIN/1.73
GLUCOSE BLD-MCNC: 393 MG/DL
GLUCOSE BLD-MCNC: 452 MG/DL (ref 74–99)
HBA1C MFR BLD: 13 %
POTASSIUM SERPL-SCNC: 4.8 MMOL/L (ref 3.5–5)
SODIUM BLD-SCNC: 138 MMOL/L (ref 132–146)

## 2020-05-15 PROCEDURE — 82962 GLUCOSE BLOOD TEST: CPT | Performed by: STUDENT IN AN ORGANIZED HEALTH CARE EDUCATION/TRAINING PROGRAM

## 2020-05-15 PROCEDURE — 99213 OFFICE O/P EST LOW 20 MIN: CPT | Performed by: STUDENT IN AN ORGANIZED HEALTH CARE EDUCATION/TRAINING PROGRAM

## 2020-05-15 PROCEDURE — 36415 COLL VENOUS BLD VENIPUNCTURE: CPT

## 2020-05-15 PROCEDURE — 80048 BASIC METABOLIC PNL TOTAL CA: CPT

## 2020-05-15 PROCEDURE — 83036 HEMOGLOBIN GLYCOSYLATED A1C: CPT | Performed by: STUDENT IN AN ORGANIZED HEALTH CARE EDUCATION/TRAINING PROGRAM

## 2020-05-15 PROCEDURE — 36415 COLL VENOUS BLD VENIPUNCTURE: CPT | Performed by: FAMILY MEDICINE

## 2020-05-15 RX ORDER — ATENOLOL 50 MG/1
TABLET ORAL
Qty: 180 TABLET | Refills: 1 | Status: SHIPPED
Start: 2020-05-15 | End: 2020-08-25 | Stop reason: SDUPTHER

## 2020-05-15 RX ORDER — DOCUSATE SODIUM 100 MG/1
100 CAPSULE, LIQUID FILLED ORAL 2 TIMES DAILY
Qty: 60 CAPSULE | Refills: 5 | Status: SHIPPED
Start: 2020-05-15 | End: 2020-08-25 | Stop reason: SDUPTHER

## 2020-05-15 RX ORDER — DIPHENHYDRAMINE HCL 50 MG
50 CAPSULE ORAL NIGHTLY
Qty: 90 CAPSULE | Refills: 0 | Status: SHIPPED
Start: 2020-05-15 | End: 2020-08-03 | Stop reason: SDUPTHER

## 2020-05-15 ASSESSMENT — ENCOUNTER SYMPTOMS: SHORTNESS OF BREATH: 0

## 2020-05-15 NOTE — PROGRESS NOTES
[address] [facility]  Department of Family Medicine  Family Medicine Residency Program      Patient:  Anjali Medrano 61 y.o. male     Date of Service: 5/15/20      Chief complaint:   Chief Complaint   Patient presents with    Diabetes         History of Present Illness   The patient is a 61 y.o. male  presented to the clinic with complaints as above. Patient is currently taking 35 units twice daily of 70/30 mix, and metformin 1000 MGs twice daily. A1c today is 13.0, and POCT glucose 392  Home blood glucose monitoring range: Not checking glucose at home. States he has an old Accu-Chek machine, which he is unsure whether it is broken, or batteries are not working. He will take to [employer], and get batteries replaced if needed, or  purchase a new Accu-Chek machine and needs to. Of notes I did previously order a freestyle machine, which he picked up, but he is not using because does not have the strips for that. Hypoglycemic episode(s): No  Statin: Yes  ACEI: Losartan  ASA: Yes  Ophthalmology: Will make appointment, but states he does not need it. Denies any changes to his eyesight. Podiatry: Has Seen Podiatrist in [place] in the past but cant afford $45 co pay  Dentistry: No, has no teeth  Compliance with diet: Admits to eating pasta, and rosa    Continues to follow with multiple specialists, for his multiple chronic conditions. PastMedical History:      Diagnosis Date    Anxiety     Asthma     CAD (coronary artery disease) 2008    2 stents in 2008 .  [facility]     Chills     Chronic sinusitis     [facility]     COPD (chronic obstructive pulmonary disease) (HCC)     Depression     Diabetes mellitus (HCC)     on insulin    GERD (gastroesophageal reflux disease)     H/O cardiovascular stress test     H/O chest x-ray     Hand fracture 3/2010    Fractured both hands.  Hepatitis C     Work up was negative.  Saw specialist     History of tobacco follow with podiatry, and ophthalmology. Refused hepatitis B vaccine. Hyperkalemia- potassium 5.4 couple months ago. Will recheck BMP. CKD/COPD/CAD/Hx of NSTEMI-continue to follow with specialist       Diagnosis Orders   1. Diabetes mellitus type 2 in obese (HCC)  metFORMIN (GLUCOPHAGE) 1000 MG tablet    BASIC METABOLIC PANEL    POCT glycosylated hemoglobin (Hb A1C)    POCT Glucose   2. Medication refill  diphenhydrAMINE (BENADRYL) 50 MG capsule    docusate sodium (DOCQLACE) 100 MG capsule    atenolol (TENORMIN) 50 MG tablet   3. CKD (chronic kidney disease) stage 3, GFR 30-59 ml/min ([facility])     4. Hyperkalemia  BASIC METABOLIC PANEL   5. Pulmonary emphysema, unspecified [facility] type [facility]           Return to Office: Return in about 1 month (around 6/15/2020) for DM. Medication List:    Current Outpatient Medications   Medication Sig Dispense Refill    diphenhydrAMINE (BENADRYL) 50 MG capsule Take 1 capsule by mouth nightly 90 capsule 0    traZODone (DESYREL) 100 MG tablet Take 3 tablets by mouth nightly 90 tablet 1    atorvastatin (LIPITOR) 80 MG tablet take 1 tablet by mouth once daily 90 tablet 0    finasteride (PROSCAR) 5 MG tablet Take 1 tablet by mouth daily 90 tablet 1    gabapentin (NEURONTIN) 100 MG capsule Take 2 capsules by mouth 2 times daily for 90 days.  360 capsule 0    metFORMIN (GLUCOPHAGE) 1000 MG tablet take 1 tablet by mouth twice a day with meals 180 tablet 1    hydroCHLOROthiazide (HYDRODIURIL) 25 MG tablet Take 1 tablet by mouth daily 90 tablet 1    amLODIPine (NORVASC) 10 MG tablet take 1 tablet by mouth once daily 90 tablet 1    aspirin 81 MG EC tablet Take 1 tablet by mouth daily 90 tablet 1    Omega-3 Fatty Acids (FISH OIL) 1000 MG CAPS Take 1 capsule by mouth daily 90 capsule 1    glucose monitoring kit (FREESTYLE) monitoring kit 1 kit by Does not apply route daily 1 kit 0    losartan (COZAAR) 25 MG tablet Take 1 tablet by mouth daily 90 tablet 0    omeprazole

## 2020-05-21 ENCOUNTER — TELEPHONE (OUTPATIENT)
Dept: FAMILY MEDICINE CLINIC | Age: 60
End: 2020-05-21

## 2020-05-21 NOTE — TELEPHONE ENCOUNTER
Please let patient know that his labs are worsening losing kidney function, glucose. Potassium is within normal limits. He needs to follow-up with his kidney doctor, and he needs to check his glucose daily as we had talked about, so we can titrate his insulin faster. He is going to need to change his diet. If this continues there is a good chance he can be in renal failure in a couple years.     BMP:    Lab Results   Component Value Date     05/15/2020    K 4.8 05/15/2020    CL 94 05/15/2020    CO2 23 05/15/2020    BUN 21 05/15/2020    LABALBU 3.8 01/09/2020          CREATININE 1.5 05/15/2020    CALCIUM 9.9 05/15/2020    GFRAA 58 05/15/2020    LABGLOM 48 05/15/2020    GLUCOSE 393 05/15/2020           HgBA1c:    Lab Results   Component Value Date    LABA1C 13.0 05/15/2020       Rashmi Persaud MD PGY-3

## 2020-06-02 RX ORDER — LOSARTAN POTASSIUM 25 MG/1
25 TABLET ORAL DAILY
Qty: 90 TABLET | Refills: 0 | Status: SHIPPED
Start: 2020-06-02 | End: 2020-08-25 | Stop reason: SDUPTHER

## 2020-06-12 ENCOUNTER — OFFICE VISIT (OUTPATIENT)
Dept: FAMILY MEDICINE CLINIC | Age: 60
End: 2020-06-12
Payer: MEDICARE

## 2020-06-12 VITALS
TEMPERATURE: 98.5 F | DIASTOLIC BLOOD PRESSURE: 88 MMHG | SYSTOLIC BLOOD PRESSURE: 138 MMHG | BODY MASS INDEX: 42.66 KG/M2 | RESPIRATION RATE: 16 BRPM | OXYGEN SATURATION: 90 % | HEART RATE: 72 BPM | WEIGHT: 315 LBS | HEIGHT: 72 IN

## 2020-06-12 LAB
CHP ED QC CHECK: NORMAL
GLUCOSE BLD-MCNC: 403 MG/DL

## 2020-06-12 PROCEDURE — 82962 GLUCOSE BLOOD TEST: CPT | Performed by: STUDENT IN AN ORGANIZED HEALTH CARE EDUCATION/TRAINING PROGRAM

## 2020-06-12 PROCEDURE — 99212 OFFICE O/P EST SF 10 MIN: CPT | Performed by: STUDENT IN AN ORGANIZED HEALTH CARE EDUCATION/TRAINING PROGRAM

## 2020-06-12 PROCEDURE — 99213 OFFICE O/P EST LOW 20 MIN: CPT | Performed by: STUDENT IN AN ORGANIZED HEALTH CARE EDUCATION/TRAINING PROGRAM

## 2020-06-12 RX ORDER — MIRTAZAPINE 30 MG/1
TABLET, FILM COATED ORAL
Qty: 60 TABLET | Refills: 1 | Status: SHIPPED
Start: 2020-06-12 | End: 2020-08-25 | Stop reason: SDUPTHER

## 2020-06-12 RX ORDER — DULAGLUTIDE 0.75 MG/.5ML
0.75 INJECTION, SOLUTION SUBCUTANEOUS
Qty: 4 PEN | Refills: 0 | Status: SHIPPED
Start: 2020-06-12 | End: 2020-07-20

## 2020-06-12 NOTE — PROGRESS NOTES
Review of Systems   Constitutional: Negative for fever. Respiratory: Negative for shortness of breath. Cardiovascular: Negative for chest pain. Genitourinary: Negative for dysuria. Physical Exam   Vitals: /88   Pulse 72   Temp 98.5 °F (36.9 °C)   Resp 16   Ht 6' (1.829 m)   Wt (!) 351 lb (159.2 kg)   SpO2 90%   BMI 47.60 kg/m²          General Appearance: Well developed, awake,alert, oriented, no acute distress  HEENT: Normocephalic, atraumatic. Chest wall/Lung: Clear toauscultation bilaterally,  respirations unlabored. Noronchi/wheezing/rales. On 4L O2 chronic  Heart: Regular rate andrhythm, S1 and S2 normal, no murmur, rub or gallop. Abdomen: Soft, non-tender, bowel sounds normoactive, no masses, no organomegaly  Extremities:  Extremities normal, atraumatic, no cyanosis. 1-2 +edema (chronic). Musculokeletal: ROM grossly normal in all joints of extremities, no obvious joint swelling. Neurologic:   Alert&Oriented.    Psychiatric: has a normal mood and affect. Behavior is normal.          Assessment and Plan       1. Diabetes mellitus type 2 in obese (HCC)  -Continues to have uncontrolled glucose. Will increase insulin to 44 units twice daily, add Trulicity. Continue metformin. Advised to check glucose 3-4 times a day, make log and call in one week. If he continues uncontrolled will likely need to see an endocrinologist.  Once again advised lifestyle, and better food choices. - Handicap Placard MISC; by Does not apply route  Dispense: 1 each; Refill: 0  - insulin 70-30 (NOVOLIN 70/30) (70-30) 100 UNIT per ML injection vial; Inject 44 Units into the skin 2 times daily  Dispense: 1 vial; Refill: 3  - Dulaglutide (TRULICITY) 9.74 HX/3.4TH SOPN; Inject 0.75 mg into the skin every 7 days  Dispense: 4 pen; Refill: 0  - POCT Glucose    2. CKD (chronic kidney disease) stage 3, GFR 30-59 ml/min ([facility])  3.  COPD exacerbation ([facility]  - Handicap Placard MISC; by Does not apply route Dispense: 1 each; Refill: 0    4. Medication refill  - mirtazapine (REMERON) 30 MG tablet; take 1 tablet by mouth every evening  Dispense: 60 tablet; Refill: 1    Return to Office: Return in about 1 month (around 7/12/2020) for Diabetes Mellitus. Medication List Prior To Visit:    Current Outpatient Medications   Medication Sig Dispense Refill    losartan (COZAAR) 25 MG tablet Take 1 tablet by mouth daily 90 tablet 0    diphenhydrAMINE (BENADRYL) 50 MG capsule Take 1 capsule by mouth nightly 90 capsule 0    docusate sodium (DOCQLACE) 100 MG capsule Take 1 capsule by mouth 2 times daily 60 capsule 5    atenolol (TENORMIN) 50 MG tablet take 1 tablet by mouth twice a day 180 tablet 1    metFORMIN (GLUCOPHAGE) 1000 MG tablet take 1 tablet by mouth twice a day with meals 180 tablet 1    traZODone (DESYREL) 100 MG tablet Take 3 tablets by mouth nightly 90 tablet 1    atorvastatin (LIPITOR) 80 MG tablet take 1 tablet by mouth once daily 90 tablet 0    finasteride (PROSCAR) 5 MG tablet Take 1 tablet by mouth daily 90 tablet 1    gabapentin (NEURONTIN) 100 MG capsule Take 2 capsules by mouth 2 times daily for 90 days.  360 capsule 0    hydroCHLOROthiazide (HYDRODIURIL) 25 MG tablet Take 1 tablet by mouth daily 90 tablet 1    amLODIPine (NORVASC) 10 MG tablet take 1 tablet by mouth once daily 90 tablet 1    aspirin 81 MG EC tablet Take 1 tablet by mouth daily 90 tablet 1    Omega-3 Fatty Acids (FISH OIL) 1000 MG CAPS Take 1 capsule by mouth daily 90 capsule 1    glucose monitoring kit (FREESTYLE) monitoring kit 1 kit by Does not apply route daily 1 kit 0    omeprazole (PRILOSEC) 20 MG delayed release capsule Take 1 capsule by mouth daily 90 capsule 1    mirtazapine (REMERON) 30 MG tablet take 1 tablet by mouth every evening 60 tablet 1    beclomethasone (QVAR) 80 MCG/ACT inhaler Inhale 2 puffs into the lungs 2 times daily      tamsulosin (FLOMAX) 0.4 MG capsule take 1 capsule by mouth once daily 90

## 2020-06-15 ASSESSMENT — ENCOUNTER SYMPTOMS: SHORTNESS OF BREATH: 0

## 2020-06-16 RX ORDER — TRAZODONE HYDROCHLORIDE 100 MG/1
300 TABLET ORAL NIGHTLY
Qty: 90 TABLET | Refills: 1 | Status: SHIPPED
Start: 2020-06-16 | End: 2020-07-20 | Stop reason: SDUPTHER

## 2020-07-06 ENCOUNTER — HOSPITAL ENCOUNTER (OUTPATIENT)
Dept: CT IMAGING | Age: 60
Discharge: HOME | End: 2020-07-08
Payer: MEDICARE

## 2020-07-06 ENCOUNTER — TELEPHONE (OUTPATIENT)
Dept: CASE MANAGEMENT | Age: 60
End: 2020-07-06

## 2020-07-06 PROCEDURE — G0297 LDCT FOR LUNG CA SCREEN: HCPCS

## 2020-07-06 NOTE — TELEPHONE ENCOUNTER
I called and left a message for the patient to call the office back at [phone] if unable to keep his 7/6/2020 ct lung screening at [facility] with an arrival of 11:30 am.        Electronically signed by Shahid Carter on 7/6/20 at 10:35 AM EDT

## 2020-07-06 NOTE — TELEPHONE ENCOUNTER
Last Appointment:  Visit date not found  Future Appointments   Date Time Provider Alex Anders   7/10/2020  2:10 PM Armando Austin MD [facility]   7/20/2020 10:40 AM DO Author Haris Tapia [facility]

## 2020-07-07 ENCOUNTER — TELEPHONE (OUTPATIENT)
Dept: CASE MANAGEMENT | Age: 60
End: 2020-07-07

## 2020-07-07 RX ORDER — GABAPENTIN 100 MG/1
200 CAPSULE ORAL 2 TIMES DAILY
Qty: 360 CAPSULE | Refills: 0 | Status: SHIPPED
Start: 2020-07-07 | End: 2020-08-25 | Stop reason: SDUPTHER

## 2020-07-08 RX ORDER — TAMSULOSIN HYDROCHLORIDE 0.4 MG/1
CAPSULE ORAL
Qty: 90 CAPSULE | Refills: 1 | Status: SHIPPED
Start: 2020-07-08 | End: 2020-07-20 | Stop reason: SDUPTHER

## 2020-07-08 NOTE — TELEPHONE ENCOUNTER
Last Appointment:  Visit date not found  Future Appointments   Date Time Provider Alex Anders   7/10/2020  2:30 PM Madalyn Mallory MD [facility]   7/20/2020 10:40 AM Claudell Demark, DO Launa Sims [facility]

## 2020-07-20 ENCOUNTER — OFFICE VISIT (OUTPATIENT)
Dept: FAMILY MEDICINE CLINIC | Age: 60
End: 2020-07-20
Payer: MEDICARE

## 2020-07-20 ENCOUNTER — HOSPITAL ENCOUNTER (OUTPATIENT)
Age: 60
Discharge: HOME | End: 2020-07-22
Payer: MEDICARE

## 2020-07-20 VITALS
DIASTOLIC BLOOD PRESSURE: 85 MMHG | HEART RATE: 77 BPM | BODY MASS INDEX: 42.66 KG/M2 | SYSTOLIC BLOOD PRESSURE: 143 MMHG | HEIGHT: 72 IN | OXYGEN SATURATION: 97 % | RESPIRATION RATE: 20 BRPM | WEIGHT: 315 LBS | TEMPERATURE: 97 F

## 2020-07-20 LAB
ANION GAP SERPL CALCULATED.3IONS-SCNC: 15 MMOL/L (ref 7–16)
BUN BLDV-MCNC: 20 MG/DL (ref 6–20)
CALCIUM SERPL-MCNC: 9.4 MG/DL (ref 8.6–10.2)
CHLORIDE BLD-SCNC: 96 MMOL/L (ref 98–107)
CHP ED QC CHECK: NORMAL
CO2: 27 MMOL/L (ref 22–29)
CREAT SERPL-MCNC: 1.3 MG/DL (ref 0.7–1.2)
GFR AFRICAN AMERICAN: >60
GFR NON-AFRICAN AMERICAN: 56 ML/MIN/1.73
GLUCOSE BLD-MCNC: 408 MG/DL
GLUCOSE BLD-MCNC: 447 MG/DL (ref 74–99)
POTASSIUM SERPL-SCNC: 4.7 MMOL/L (ref 3.5–5)
SODIUM BLD-SCNC: 138 MMOL/L (ref 132–146)

## 2020-07-20 PROCEDURE — 99213 OFFICE O/P EST LOW 20 MIN: CPT | Performed by: STUDENT IN AN ORGANIZED HEALTH CARE EDUCATION/TRAINING PROGRAM

## 2020-07-20 PROCEDURE — 80048 BASIC METABOLIC PNL TOTAL CA: CPT

## 2020-07-20 PROCEDURE — 36415 COLL VENOUS BLD VENIPUNCTURE: CPT | Performed by: FAMILY MEDICINE

## 2020-07-20 PROCEDURE — 99212 OFFICE O/P EST SF 10 MIN: CPT | Performed by: STUDENT IN AN ORGANIZED HEALTH CARE EDUCATION/TRAINING PROGRAM

## 2020-07-20 PROCEDURE — 82962 GLUCOSE BLOOD TEST: CPT | Performed by: STUDENT IN AN ORGANIZED HEALTH CARE EDUCATION/TRAINING PROGRAM

## 2020-07-20 RX ORDER — TRAZODONE HYDROCHLORIDE 100 MG/1
300 TABLET ORAL NIGHTLY
Qty: 90 TABLET | Refills: 1 | Status: SHIPPED
Start: 2020-07-20 | End: 2020-08-25 | Stop reason: SDUPTHER

## 2020-07-20 RX ORDER — TAMSULOSIN HYDROCHLORIDE 0.4 MG/1
CAPSULE ORAL
Qty: 90 CAPSULE | Refills: 1 | Status: SHIPPED
Start: 2020-07-20 | End: 2021-01-01 | Stop reason: SDUPTHER

## 2020-07-20 RX ORDER — GLUCOSAMINE HCL/CHONDROITIN SU 500-400 MG
CAPSULE ORAL
Qty: 300 STRIP | Refills: 1 | Status: SHIPPED
Start: 2020-07-20 | End: 2021-01-01 | Stop reason: SDUPTHER

## 2020-07-20 NOTE — PROGRESS NOTES
[facility]  FAMILY MEDICINE RESIDENCY PROGRAM  DATE OF VISIT : 2020    Patient : Victorino Pulido   Age : 61 y.o.  : 1960   MRN : [id number]   ______________________________________________________________________    Chief Complaint :   Chief Complaint   Patient presents with    Diabetes    Hypertension     slightly elevated BP today see vs flow sheet        HPI : Victorino Pulido is 61 y.o. male who presented to the clinic today for follow up of diabetes and HTN. HTN-Patient denies headache, palpitations, chest pain, SOB, syncope, SOB and cough. He does not exercise much, and wears 6L O2 at home. Diabetes-patient states he quit drinking pop and EtOH, but still eats other unhealthy foods. He reports brief numbness to his lips last week, that was relieved by restarting O2. He denies foot and hand numbness. He does not check blood glucose at home because he does not have test monitoring strips. He states he had his eyes checked last year. He reports adherence with medications. Patient also requests refills of flomax and trazodone. Past Medical History :  Past Medical History:   Diagnosis Date    Anxiety     Asthma     CAD (coronary artery disease)     2 stents in  .  CHF [facility]congestive heart failure) ([facility])     Chills     Chronic sinusitis     Cocaine abuse ([address]     COPD (chronic obstructive pulmonary disease) ([facility])     Depression     Diabetes mellitus (HCC)     on insulin    GERD (gastroesophageal reflux disease)     H/O cardiovascular stress test     Hand fracture 3/2010    Fractured both hands.  Hepatitis C     Work up was negative. Saw specialist. Negative viral load.  History of tobacco abuse     Hyperlipidemia     Hypertension     Night sweats     NSTEMI (non-ST elevation myocardial infarction) ([address] 3/2010    Hospitalized.  Obesity     Oxygen dependent     Panic attacks     Pneumonia 3/2010     LLL.     Sleep apnea     Has CPAP machine    SOB (shortness of breath)     does not know settings     Past Surgical History:   Procedure Laterality Date    COLONOSCOPY  2011    COLONOSCOPY  9/23/15   [facility] DIAGNOSTIC CARDIAC CATH LAB PROCEDURE  2/25/2009    [facility], cardiac cath/primary BM stent in proximal LAD.  OTHER SURGICAL HISTORY      anal fistula    TONSILLECTOMY         Allergies : Allergies   Allergen Reactions    Amoxicillin Hives and Shortness Of Breath    Penicillins Hives and Shortness Of Breath     TRIAMOX       Medication List :    Current Outpatient Medications   Medication Sig Dispense Refill    tamsulosin (FLOMAX) 0.4 MG capsule take 1 capsule by mouth once daily 90 capsule 1    traZODone (DESYREL) 100 MG tablet Take 3 tablets by mouth nightly 90 tablet 1    blood glucose monitor strips Test 3 times a day & as needed for symptoms of irregular blood glucose. Dispense sufficient amount for indicated testing frequency plus additional to accommodate PRN testing needs. 300 strip 1    gabapentin (NEURONTIN) 100 MG capsule Take 2 capsules by mouth 2 times daily for 90 days.  360 capsule 0    insulin 70-30 (NOVOLIN 70/30) (70-30) 100 UNIT per ML injection vial Inject 44 Units into the skin 2 times daily 8 vial 3    Handicap Placard MISC by Does not apply route 1 each 0    mirtazapine (REMERON) 30 MG tablet take 1 tablet by mouth every evening 60 tablet 1    losartan (COZAAR) 25 MG tablet Take 1 tablet by mouth daily 90 tablet 0    diphenhydrAMINE (BENADRYL) 50 MG capsule Take 1 capsule by mouth nightly 90 capsule 0    docusate sodium (DOCQLACE) 100 MG capsule Take 1 capsule by mouth 2 times daily 60 capsule 5    atenolol (TENORMIN) 50 MG tablet take 1 tablet by mouth twice a day 180 tablet 1    metFORMIN (GLUCOPHAGE) 1000 MG tablet take 1 tablet by mouth twice a day with meals 180 tablet 1    atorvastatin (LIPITOR) 80 MG tablet take 1 tablet by mouth once daily 90 tablet 0    finasteride (PROSCAR) 5 MG tablet Take 1 tablet or carotid bruits. Chest wall/Lung: CTAB, respirations unlabored. No ronchi/wheezing/rales   Heart: RRR, normal S1 and S2, no murmurs, rubs or gallops. Extremities: Extremities normal, atraumatic, no cyanosis, clubbing or edema. Skin: Skin color, texture, turgor normal, no rashes or lesions  Musculokeletal: ROM grossly normal in all joints of extremities, no obvious joint swelling. Lymphnodes: No lymph node enlargement appreciated  Neurologic: Alert&Oriented x3. No focal motor deficits detected. Psychiatric: Normal mood. Normal affect. Normal behavior. ______________________________________________________________________    Assessment & Plan :    1. Diabetes mellitus type 2 in obese ([address]  · Patient to buy test strips and to return to office in 2 weeks to go over blood sugar logs--> will increase insulin as needed or add another agent if needed  - blood glucose monitor strips; Test 3 times a day & as needed for symptoms of irregular blood glucose. Dispense sufficient amount for indicated testing frequency plus additional to accommodate PRN testing needs. Dispense: 300 strip; Refill: 1  - BASIC METABOLIC PANEL; Future  - POCT Glucose  · Discussed risk of requiring dialysis if diabetes continues unchecked. 2. Benign prostatic hyperplasia with urinary frequency  - tamsulosin (FLOMAX) 0.4 MG capsule; take 1 capsule by mouth once daily  Dispense: 90 capsule; Refill: 1    3. Other insomnia  - traZODone (DESYREL) 100 MG tablet; Take 3 tablets by mouth nightly  Dispense: 90 tablet; Refill: 1    4. Essential hypertension  · On HCTZ, check:  · Last Cr 1.5  - BASIC METABOLIC PANEL; Future      Additional plan and future considerations:       Return to Office: Return in about 2 weeks (around 8/3/2020) for diabetes.     Donnie Ambrocio DO   Case discussed with Dr. Anatoly Hinojosa

## 2020-07-20 NOTE — PROGRESS NOTES
S: 61 y.o. male presents today for Diabetes and Hypertension (slightly elevated BP today see vs flow sheet )    DMII f/u: Adherent with 70/30 novolin 44 BID before meals; metformin full dose. Not check BS; no logs; no strips    HTN f/u:  Adherent with cozaar; HCTZ; norvasc    O: VS: BP (!) 143/85   Pulse 77   Temp 97 °F (36.1 °C) (Temporal)   Resp 20   Ht 6' (1.829 m)   Wt (!) 347 lb (157.4 kg)   SpO2 97% Comment: 4lO2  BMI 47.06 kg/m²   AAO/NAD, appropriate affect for mood  CV:  RRR, no murmur  Resp: CTAB    Assessment/Plan:   1) DMII, uncontrolled - repeat BMP; A1C; refill strips; increase insulin to 50UBID; pt needs to bring logs in and close f/u 2 weeks. 2) HTN uncontrolled - recheck 2 weeks at f/u  RTO: Return in about 2 weeks (around 8/3/2020) for diabetes. Attending Physician Statement  I have discussed the case, including pertinent history and exam findings with the resident. I agree with the documented assessment and plan.       Electronically signed by Chanelle Arias MD on 7/27/2020 at 10:54 AM

## 2020-07-22 ASSESSMENT — ENCOUNTER SYMPTOMS
SHORTNESS OF BREATH: 0
PHOTOPHOBIA: 0
COUGH: 0

## 2020-08-03 ENCOUNTER — OFFICE VISIT (OUTPATIENT)
Dept: FAMILY MEDICINE CLINIC | Age: 60
End: 2020-08-03
Payer: MEDICARE

## 2020-08-03 VITALS
SYSTOLIC BLOOD PRESSURE: 133 MMHG | OXYGEN SATURATION: 92 % | HEIGHT: 72 IN | WEIGHT: 315 LBS | DIASTOLIC BLOOD PRESSURE: 85 MMHG | BODY MASS INDEX: 42.66 KG/M2 | TEMPERATURE: 98.2 F | HEART RATE: 75 BPM

## 2020-08-03 LAB
CHP ED QC CHECK: NORMAL
GLUCOSE BLD-MCNC: 336 MG/DL

## 2020-08-03 PROCEDURE — 82962 GLUCOSE BLOOD TEST: CPT | Performed by: STUDENT IN AN ORGANIZED HEALTH CARE EDUCATION/TRAINING PROGRAM

## 2020-08-03 PROCEDURE — 99213 OFFICE O/P EST LOW 20 MIN: CPT | Performed by: STUDENT IN AN ORGANIZED HEALTH CARE EDUCATION/TRAINING PROGRAM

## 2020-08-03 PROCEDURE — 99212 OFFICE O/P EST SF 10 MIN: CPT | Performed by: STUDENT IN AN ORGANIZED HEALTH CARE EDUCATION/TRAINING PROGRAM

## 2020-08-03 RX ORDER — ATORVASTATIN CALCIUM 80 MG/1
TABLET, FILM COATED ORAL
Qty: 90 TABLET | Refills: 0 | Status: SHIPPED
Start: 2020-08-03 | End: 2020-01-01 | Stop reason: SDUPTHER

## 2020-08-03 RX ORDER — DULAGLUTIDE 0.75 MG/.5ML
0.75 INJECTION, SOLUTION SUBCUTANEOUS WEEKLY
Qty: 4 PEN | Refills: 2 | Status: SHIPPED
Start: 2020-08-03 | End: 2020-01-01

## 2020-08-03 RX ORDER — DIPHENHYDRAMINE HCL 50 MG
50 CAPSULE ORAL NIGHTLY
Qty: 90 CAPSULE | Refills: 0 | Status: SHIPPED
Start: 2020-08-03 | End: 2020-01-01 | Stop reason: SDUPTHER

## 2020-08-03 RX ORDER — HYDROCHLOROTHIAZIDE 25 MG/1
25 TABLET ORAL DAILY
Qty: 90 TABLET | Refills: 1 | Status: SHIPPED
Start: 2020-08-03 | End: 2021-01-01

## 2020-08-03 ASSESSMENT — ENCOUNTER SYMPTOMS
SHORTNESS OF BREATH: 0
WHEEZING: 0

## 2020-08-03 NOTE — PROGRESS NOTES
2011    COLONOSCOPY  9/23/15    DIAGNOSTIC CARDIAC CATH LAB PROCEDURE  2/25/2009    [facility], cardiac cath/primary BM stent in proximal LAD.  OTHER SURGICAL HISTORY      anal fistula    TONSILLECTOMY         Allergies : Allergies   Allergen Reactions    Amoxicillin Hives and Shortness Of Breath    Penicillins Hives and Shortness Of Breath     TRIAMOX       Medication List :    Current Outpatient Medications   Medication Sig Dispense Refill    hydroCHLOROthiazide (HYDRODIURIL) 25 MG tablet Take 1 tablet by mouth daily 90 tablet 1    atorvastatin (LIPITOR) 80 MG tablet take 1 tablet by mouth once daily 90 tablet 0    diphenhydrAMINE (BENADRYL) 50 MG capsule Take 1 capsule by mouth nightly 90 capsule 0    Dulaglutide (TRULICITY) 1.41 HB/1.8WA SOPN Inject 0.75 mg into the skin once a week 4 pen 2    tamsulosin (FLOMAX) 0.4 MG capsule take 1 capsule by mouth once daily 90 capsule 1    traZODone (DESYREL) 100 MG tablet Take 3 tablets by mouth nightly 90 tablet 1    blood glucose monitor strips Test 3 times a day & as needed for symptoms of irregular blood glucose. Dispense sufficient amount for indicated testing frequency plus additional to accommodate PRN testing needs. 300 strip 1    gabapentin (NEURONTIN) 100 MG capsule Take 2 capsules by mouth 2 times daily for 90 days.  360 capsule 0    insulin 70-30 (NOVOLIN 70/30) (70-30) 100 UNIT per ML injection vial Inject 44 Units into the skin 2 times daily 8 vial 3    Handicap Placard MISC by Does not apply route 1 each 0    mirtazapine (REMERON) 30 MG tablet take 1 tablet by mouth every evening 60 tablet 1    losartan (COZAAR) 25 MG tablet Take 1 tablet by mouth daily 90 tablet 0    docusate sodium (DOCQLACE) 100 MG capsule Take 1 capsule by mouth 2 times daily 60 capsule 5    atenolol (TENORMIN) 50 MG tablet take 1 tablet by mouth twice a day 180 tablet 1    metFORMIN (GLUCOPHAGE) 1000 MG tablet take 1 tablet by mouth twice a day with meals 180 tablet 1  finasteride (PROSCAR) 5 MG tablet Take 1 tablet by mouth daily 90 tablet 1    amLODIPine (NORVASC) 10 MG tablet take 1 tablet by mouth once daily 90 tablet 1    aspirin 81 MG EC tablet Take 1 tablet by mouth daily 90 tablet 1    Omega-3 Fatty Acids (FISH OIL) 1000 MG CAPS Take 1 capsule by mouth daily 90 capsule 1    glucose monitoring kit (FREESTYLE) monitoring kit 1 kit by Does not apply route daily 1 kit 0    omeprazole (PRILOSEC) 20 MG delayed release capsule Take 1 capsule by mouth daily 90 capsule 1    beclomethasone (QVAR) 80 MCG/ACT inhaler Inhale 2 puffs into the lungs 2 times daily      Lancets MISC Check sugars as directed. [address] [address] Nguyen for pharmacy to substitute 100 each 5    albuterol (PROVENTIL) (2.5 MG/3ML) 0.083% nebulizer solution inhale contents of 1 vial in nebulizer four times a day 120 each 5    promethazine (PHENERGAN) 25 MG tablet Take 25 mg by mouth every 6 hours as needed for Nausea.  OXYGEN Inhale 6 L into the lungs continuous       Ipratropium-Albuterol (COMBIVENT IN) Inhale 2 puffs into the lungs 2 times daily        No current facility-administered medications for this visit. Review of Systems :  Review of Systems   Eyes: Negative for visual disturbance. Respiratory: Negative for shortness of breath and wheezing. Cardiovascular: Negative for chest pain and palpitations. Endocrine: Negative for polydipsia and polyuria. Genitourinary: Negative for dysuria. Neurological: Negative for numbness. ______________________________________________________________________    Physical Exam :    Vitals: /85 (Site: Right Upper Arm, Position: Sitting, Cuff Size: Large Adult)   Pulse 75   Temp 98.2 °F (36.8 °C) (Temporal)   Ht 6' (1.829 m)   Wt (!) 346 lb (156.9 kg)   SpO2 92%   BMI 46.93 kg/m²   General Appearance: Well developed, awake, alert, oriented, and in NAD  HEENT: NCAT, MMM, no pallor or icterus. Neck: Symmetrical, trachea midline.  No JVD or carotid bruits. Chest wall/Lung: CTAB, respirations unlabored. No ronchi/wheezing/rales  Heart: RRR, normal S1 and S2, no murmurs, rubs or gallops. Extremities: Extremities normal, atraumatic, no cyanosis, clubbing or edema. Skin: Skin color, texture, turgor normal, no rashes or lesions  Musculokeletal: ROM grossly normal in all joints of extremities, no obvious joint swelling. Neurologic: Alert&Oriented x3. No focal motor deficits detected. Psychiatric: Normal mood. Normal affect. Normal behavior. ______________________________________________________________________    Assessment & Plan :    1. Diabetes mellitus type 2 in obese (HCC)  - POCT Glucose 336  · Refill:  - atorvastatin (LIPITOR) 80 MG tablet; take 1 tablet by mouth once daily  Dispense: 90 tablet; Refill: 0  · Start:  - Dulaglutide (TRULICITY) 9.39 KZ/3.1UN SOPN; Inject 0.75 mg into the skin once a week  Dispense: 4 pen; Refill: 2  · Continue 55U Novolin BID  · Check sugars 3 times/day before meals    2. Medication refill/HTN/nasal congestion/allergies  · Refill:  - hydroCHLOROthiazide (HYDRODIURIL) 25 MG tablet; Take 1 tablet by mouth daily  Dispense: 90 tablet; Refill: 1  - diphenhydrAMINE (BENADRYL) 50 MG capsule; Take 1 capsule by mouth nightly  Dispense: 90 capsule; Refill: 0      Additional plan and future considerations:    · If cannot afford trulicity or unable to control sugars with it, switch to basal insulin with meal time sliding scale    Return to Office: Return in about 3 weeks (around 8/24/2020) for diabetes follow up .     Shanel Knows, DO   Case discussed with Dr. David Hernandez

## 2020-08-25 ENCOUNTER — OFFICE VISIT (OUTPATIENT)
Dept: FAMILY MEDICINE CLINIC | Age: 60
End: 2020-08-25
Payer: MEDICARE

## 2020-08-25 VITALS
BODY MASS INDEX: 42.66 KG/M2 | HEIGHT: 72 IN | TEMPERATURE: 97.5 F | DIASTOLIC BLOOD PRESSURE: 60 MMHG | SYSTOLIC BLOOD PRESSURE: 125 MMHG | HEART RATE: 77 BPM | WEIGHT: 315 LBS | OXYGEN SATURATION: 93 %

## 2020-08-25 LAB
CHP ED QC CHECK: YES
GLUCOSE BLD-MCNC: 358 MG/DL
HBA1C MFR BLD: 10.2 %

## 2020-08-25 PROCEDURE — 82962 GLUCOSE BLOOD TEST: CPT | Performed by: STUDENT IN AN ORGANIZED HEALTH CARE EDUCATION/TRAINING PROGRAM

## 2020-08-25 PROCEDURE — 83036 HEMOGLOBIN GLYCOSYLATED A1C: CPT | Performed by: STUDENT IN AN ORGANIZED HEALTH CARE EDUCATION/TRAINING PROGRAM

## 2020-08-25 PROCEDURE — 99213 OFFICE O/P EST LOW 20 MIN: CPT | Performed by: STUDENT IN AN ORGANIZED HEALTH CARE EDUCATION/TRAINING PROGRAM

## 2020-08-25 PROCEDURE — 99212 OFFICE O/P EST SF 10 MIN: CPT | Performed by: STUDENT IN AN ORGANIZED HEALTH CARE EDUCATION/TRAINING PROGRAM

## 2020-08-25 RX ORDER — FINASTERIDE 5 MG/1
5 TABLET, FILM COATED ORAL DAILY
Qty: 90 TABLET | Refills: 1 | Status: SHIPPED
Start: 2020-08-25 | End: 2020-01-01 | Stop reason: SDUPTHER

## 2020-08-25 RX ORDER — LOSARTAN POTASSIUM 25 MG/1
25 TABLET ORAL DAILY
Qty: 90 TABLET | Refills: 0 | Status: SHIPPED
Start: 2020-08-25 | End: 2020-01-01 | Stop reason: SDUPTHER

## 2020-08-25 RX ORDER — INSULIN ASPART 100 [IU]/ML
6 INJECTION, SOLUTION INTRAVENOUS; SUBCUTANEOUS
Qty: 5 CARTRIDGE | Refills: 3 | Status: SHIPPED
Start: 2020-08-25 | End: 2020-01-01 | Stop reason: SDUPTHER

## 2020-08-25 RX ORDER — TRAZODONE HYDROCHLORIDE 100 MG/1
300 TABLET ORAL NIGHTLY
Qty: 90 TABLET | Refills: 1 | Status: SHIPPED
Start: 2020-08-25 | End: 2020-01-01 | Stop reason: SDUPTHER

## 2020-08-25 RX ORDER — ATENOLOL 50 MG/1
TABLET ORAL
Qty: 180 TABLET | Refills: 1 | Status: SHIPPED
Start: 2020-08-25 | End: 2021-01-01 | Stop reason: SDUPTHER

## 2020-08-25 RX ORDER — MIRTAZAPINE 30 MG/1
TABLET, FILM COATED ORAL
Qty: 60 TABLET | Refills: 1 | Status: SHIPPED
Start: 2020-08-25 | End: 2020-01-01 | Stop reason: SDUPTHER

## 2020-08-25 RX ORDER — GABAPENTIN 100 MG/1
200 CAPSULE ORAL 2 TIMES DAILY
Qty: 360 CAPSULE | Refills: 0 | Status: SHIPPED
Start: 2020-08-25 | End: 2020-01-01 | Stop reason: SDUPTHER

## 2020-08-25 RX ORDER — OMEPRAZOLE 20 MG/1
20 CAPSULE, DELAYED RELEASE ORAL DAILY
Qty: 90 CAPSULE | Refills: 1 | Status: SHIPPED
Start: 2020-08-25 | End: 2021-01-01 | Stop reason: SDUPTHER

## 2020-08-25 RX ORDER — ASPIRIN 81 MG/1
81 TABLET ORAL DAILY
Qty: 90 TABLET | Refills: 1 | Status: SHIPPED | OUTPATIENT
Start: 2020-08-25

## 2020-08-25 RX ORDER — AMLODIPINE BESYLATE 10 MG/1
TABLET ORAL
Qty: 90 TABLET | Refills: 1 | Status: SHIPPED
Start: 2020-08-25 | End: 2021-01-01 | Stop reason: SDUPTHER

## 2020-08-25 RX ORDER — DOCUSATE SODIUM 100 MG/1
100 CAPSULE, LIQUID FILLED ORAL 2 TIMES DAILY
Qty: 60 CAPSULE | Refills: 5 | Status: SHIPPED
Start: 2020-08-25 | End: 2021-01-01 | Stop reason: SDUPTHER

## 2020-08-25 RX ORDER — CHLORAL HYDRATE 500 MG
1000 CAPSULE ORAL DAILY
Qty: 90 CAPSULE | Refills: 1 | Status: ON HOLD
Start: 2020-08-25 | End: 2021-01-01 | Stop reason: HOSPADM

## 2020-08-25 RX ORDER — LANCETS 30 GAUGE
EACH MISCELLANEOUS
Qty: 100 EACH | Refills: 5 | Status: SHIPPED
Start: 2020-08-25 | End: 2021-01-01 | Stop reason: SDUPTHER

## 2020-08-25 NOTE — PROGRESS NOTES
[facility]  FAMILY MEDICINE RESIDENCY PROGRAM  DATE OF VISIT : 2020    Patient : Afshin Bales   Age : 61 y.o.  : 1960   MRN : [id number]   ______________________________________________________________________    Chief Complaint :   Chief Complaint   Patient presents with    Diabetes     F/U    Medication Refill       HPI : Afshin Bales is 61 y.o. male who presented to the clinic today for diabetes follow up and medication refill. He denies numbness and tingling. He notes his nocturia has decreased. He denies increased thirst. He notes that he tries to avoid sugary drinks. He takes 55 U Lantus after breakfast and after dinner. He was not able to get the trulicity due to expense. He denies hypoglycemic episodes. He checks his sugars twice a day and they tend to run between 180-240. We are also refilling several medications today. He is due for HA1c today. Past Medical History :  Past Medical History:   Diagnosis Date    Anxiety     Asthma     CAD (coronary artery disease)     2 stents in  .  CHF (congestive heart failure) (HCC)     Chills     Chronic sinusitis     Cocaine abuse ([address]     COPD (chronic obstructive pulmonary disease) (HCC)     Depression     Diabetes mellitus (HCC)     on insulin    GERD (gastroesophageal reflux disease)     H/O cardiovascular stress test     Hand fracture 3/2010    Fractured both hands.  Hepatitis C     Work up was negative. Saw specialist. Negative viral load.  History of tobacco abuse     Hyperlipidemia     Hypertension     Night sweats     NSTEMI (non-ST elevation myocardial infarction) ([address] 3/2010    Hospitalized.  Obesity     Oxygen dependent     Panic attacks     Pneumonia 3/2010     LLL.     Sleep apnea     Has CPAP machine    SOB (shortness of breath)     does not know settings     Past Surgical History:   Procedure Laterality Date    COLONOSCOPY      COLONOSCOPY  9/23/15   [address] [place] [address] DIAGNOSTIC CARDIAC CATH LAB PROCEDURE  2/25/2009    [facility], cardiac cath/primary BM stent in proximal LAD.  OTHER SURGICAL HISTORY      anal fistula    TONSILLECTOMY         Allergies : Allergies   Allergen Reactions    Amoxicillin Hives and Shortness Of Breath    Penicillins Hives and Shortness Of Breath     TRIAMOX       Medication List :    Current Outpatient Medications   Medication Sig Dispense Refill    traZODone (DESYREL) 100 MG tablet Take 3 tablets by mouth nightly 90 tablet 1    gabapentin (NEURONTIN) 100 MG capsule Take 2 capsules by mouth 2 times daily for 90 days. 360 capsule 0    mirtazapine (REMERON) 30 MG tablet take 1 tablet by mouth every evening 60 tablet 1    losartan (COZAAR) 25 MG tablet Take 1 tablet by mouth daily 90 tablet 0    docusate sodium (DOCQLACE) 100 MG capsule Take 1 capsule by mouth 2 times daily 60 capsule 5    atenolol (TENORMIN) 50 MG tablet take 1 tablet by mouth twice a day 180 tablet 1    metFORMIN (GLUCOPHAGE) 1000 MG tablet take 1 tablet by mouth twice a day with meals 180 tablet 1    finasteride (PROSCAR) 5 MG tablet Take 1 tablet by mouth daily 90 tablet 1    amLODIPine (NORVASC) 10 MG tablet take 1 tablet by mouth once daily 90 tablet 1    aspirin 81 MG EC tablet Take 1 tablet by mouth daily 90 tablet 1    Omega-3 Fatty Acids (FISH OIL) 1000 MG CAPS Take 1 capsule by mouth daily 90 capsule 1    omeprazole (PRILOSEC) 20 MG delayed release capsule Take 1 capsule by mouth daily 90 capsule 1    Lancets MISC Check sugars as directed.  [facility] for pharmacy to substitute 100 each 5    Insulin Syringes, Disposable, U-100 1 ML MISC 1 each by Does not apply route daily 100 each 3    diphenhydrAMINE (SCOT-TUSSIN ALLERGY RELIEF) 12.5 MG/5ML liquid Take 5 mLs by mouth 4 times daily as needed for Allergies 120 mL 1    insulin aspart (NOVOLOG PENFILL) 100 UNIT/ML injection cartridge Inject 6 Units into the skin 3 times daily (before meals) 5 Cartridge 3    for sleep disturbance.     ______________________________________________________________________    Physical Exam :    Vitals: /60 (Site: Left Upper Arm, Position: Sitting, Cuff Size: Large Adult)   Pulse 77   Temp 97.5 °F (36.4 °C) (Temporal)   Ht 6' (1.829 m)   Wt (!) 353 lb (160.1 kg)   SpO2 93%   BMI 47.88 kg/m²   General Appearance: Well developed, awake, alert, oriented, and in NAD  HEENT: NCAT, MMM, no pallor or icterus. Neck: Symmetrical, trachea midline. Chest wall/Lung: CTAB, respirations unlabored. No ronchi/wheezing/rales   Heart: RRR, normal S1 and S2, no murmurs, rubs or gallops. Extremities: Extremities normal, atraumatic, no cyanosis, clubbing; trace edema appreciated  Skin: Skin color, texture, turgor normal, no rashes or lesions  Musculokeletal: ROM grossly normal in all joints of extremities, no obvious joint swelling. Neurologic: Alert&Oriented x3. No focal motor deficits detected. Psychiatric: Normal mood. Normal affect. Normal behavior. ______________________________________________________________________    Assessment & Plan :    1. Diabetes mellitus type 2 in obese (HCC)  - POCT glycosylated hemoglobin (Hb A1C)  - POCT Glucose  · Refill:  - gabapentin (NEURONTIN) 100 MG capsule; Take 2 capsules by mouth 2 times daily for 90 days. Dispense: 360 capsule; Refill: 0  - metFORMIN (GLUCOPHAGE) 1000 MG tablet; take 1 tablet by mouth twice a day with meals  Dispense: 180 tablet; Refill: 1  - Lancets MISC; Check sugars as directed. Paolo Marshall for pharmacy to substitute  Dispense: 100 each; Refill: 5  - Insulin Syringes, Disposable, U-100 1 ML MISC; 1 each by Does not apply route daily  Dispense: 100 each; Refill: 3  - aspirin 81 MG EC tablet; Take 1 tablet by mouth daily  Dispense: 90 tablet; Refill: 1  · Continue Lantus 55 U BID, start novolog 6 U before meals  - insulin aspart (NOVOLOG PENFILL) 100 UNIT/ML injection cartridge;  Inject 6 Units into the skin 3 times daily (before

## 2020-08-25 NOTE — PROGRESS NOTES
Attending Physician Statement    S:   Chief Complaint   Patient presents with    Diabetes     F/U    Medication Refill      DM- on Lantus 55 u BID. Sugars are 180-240. Unable to get Trulicity (cost)  O: Blood pressure 125/60, pulse 77, temperature 97.5 °F (36.4 °C), temperature source Temporal, height 6' (1.829 m), weight (!) 353 lb (160.1 kg), SpO2 93 %. Exam:   Heart - RRR   Lungs - clear   Ext - trace edema  A: DM uncontrolled  P:  Add humalog before meals. Start with 6 units before meals   Follow-up as ordered    I have discussed the case, including pertinent history and exam findings with the resident. I agree with the documented assessment and plan.

## 2020-08-30 ASSESSMENT — ENCOUNTER SYMPTOMS
SHORTNESS OF BREATH: 0
CONSTIPATION: 1

## 2020-09-24 NOTE — TELEPHONE ENCOUNTER
Last Appointment:  8/25/2020  Future Appointments   Date Time Provider Alex Anders   9/25/2020 10:20 AM DO Leslye Rain [facility]

## 2020-09-25 NOTE — PROGRESS NOTES
daily 90 tablet 1    aspirin 81 MG EC tablet Take 1 tablet by mouth daily 90 tablet 1    Omega-3 Fatty Acids (FISH OIL) 1000 MG CAPS Take 1 capsule by mouth daily 90 capsule 1    omeprazole (PRILOSEC) 20 MG delayed release capsule Take 1 capsule by mouth daily 90 capsule 1    Lancets MISC Check sugars as directed. [id number] Monie Nguyen for pharmacy to substitute 100 each 5    Insulin Syringes, Disposable, U-100 1 ML MISC 1 each by Does not apply route daily 100 each 3    insulin aspart (NOVOLOG PENFILL) 100 UNIT/ML injection cartridge Inject 6 Units into the skin 3 times daily (before meals) (Patient taking differently: Inject 20 Units into the skin 2 times daily ) 5 Cartridge 3    hydroCHLOROthiazide (HYDRODIURIL) 25 MG tablet Take 1 tablet by mouth daily 90 tablet 1    atorvastatin (LIPITOR) 80 MG tablet take 1 tablet by mouth once daily 90 tablet 0    diphenhydrAMINE (BENADRYL) 50 MG capsule Take 1 capsule by mouth nightly 90 capsule 0    insulin 70-30 (NOVOLIN 70/30) (70-30) 100 UNIT per ML injection vial Inject 55 Units into the skin 2 times daily 8 vial 3    tamsulosin (FLOMAX) 0.4 MG capsule take 1 capsule by mouth once daily 90 capsule 1    blood glucose monitor strips Test 3 times a day & as needed for symptoms of irregular blood glucose. Dispense sufficient amount for indicated testing frequency plus additional to accommodate PRN testing needs.  300 strip 1    Handicap Placard MISC by Does not apply route 1 each 0    glucose monitoring kit (FREESTYLE) monitoring kit 1 kit by Does not apply route daily 1 kit 0    beclomethasone (QVAR) 80 MCG/ACT inhaler Inhale 2 puffs into the lungs 2 times daily      albuterol (PROVENTIL) (2.5 MG/3ML) 0.083% nebulizer solution inhale contents of 1 vial in nebulizer four times a day 120 each 5    OXYGEN Inhale 6 L into the lungs continuous       Ipratropium-Albuterol (COMBIVENT IN) Inhale 2 puffs into the lungs 2 times daily       diphenhydrAMINE (BENADRYL) 50 MG capsule Take 1 capsule by mouth nightly as needed for Itching 30 capsule 2     No current facility-administered medications for this visit. Review of Systems :  Review of Systems   Respiratory: Negative for shortness of breath and wheezing. Cardiovascular: Negative for chest pain and palpitations. Endocrine: Negative for polydipsia and polyuria. Genitourinary: Negative for difficulty urinating and frequency. Reports nocturia x 1-2 nightly   Neurological: Negative for dizziness, numbness and headaches.     ______________________________________________________________________    Physical Exam :    Vitals: /80   Pulse 76   Temp 97.7 °F (36.5 °C) (Temporal)   Resp 22   Ht 6' (1.829 m)   Wt (!) 363 lb (164.7 kg)   SpO2 92% Comment: 3L O2  BMI 49.23 kg/m²   General Appearance: Well developed, awake, alert, oriented, and in NAD  HEENT: NCAT, MMM, no pallor or icterus. Neck: Symmetrical, trachea midline. Chest wall/Lung: CTAB, respirations unlabored. Some mild crackles lower lobes B/L, faint wheeze R upper lobe  Heart: RRR, normal S1 and S2, no murmurs, rubs or gallops. Abdomen: SNTND  Extremities: Extremities normal, atraumatic, no cyanosis, clubbing or edema. Skin: Skin color, texture, turgor normal, no rashes or lesions  Musculokeletal: ROM grossly normal in all joints of extremities, no obvious joint swelling. Neurologic: Alert&Oriented x3. No focal motor deficits detected. Psychiatric: Normal mood. Normal affect. Normal behavior. ______________________________________________________________________    Assessment & Plan :     Diabetes mellitus type 2 in obese (HCC)  · Continue Novolin 70/30 55 units twice daily. Increase NovoLog to 10 units twice a day after meals  · This regimen is atypical, but it was implemented by the pharmacist because of the patient's inability to pay for other more standard regimens. · Refill:  - gabapentin (NEURONTIN) 100 MG capsule;  Take 2 capsules by mouth 2 times daily for 90 days. Dispense: 360 capsule; Refill: 0    Nausea  · Refill:  - promethazine (PHENERGAN) 25 MG tablet; Take 1 tablet by mouth every 6 hours as needed for Nausea  Dispense: 30 tablet; Refill: 3    Hypertension  · Continue current regimen as blood pressure is controlled. COPD/emphysema   · Continue current regimen    Additional plan and future considerations:       Return to Office: Return in about 2 months (around 11/25/2020) for diabetes.     Latonya Laughlin DO   Case discussed with Dr. Peter Gomez

## 2020-09-25 NOTE — PROGRESS NOTES
Mr. Ponciano Hatchet is a 80-year-old male presented today for a diabetes follow-up. He states that his blood glucose is 185 in the morning. He is currently on Novolin 70/30 55 units twice a day, and NovoLog 6units 3 times before meals. The patient is not compliant with taking his insulin as directed, he is taking his 70/30   insulin, however he is taking 10 units of NovoLog only in the morning. He is also on metformin 1000 twice daily. Last A1c in August is 10.5. He denies any dizziness or headaches numbness and or tingling in extremities. .  Blood pressure controlled 131/80 today blood pressure medications reviewed is taking them regularly with no side effects and review of system he denies any shortness of breath any chest pain, no problem with urination, no abdominal pain, constipation, nausea, diarrhea. Says he is compliant with his respiratory regimen for  COPD, ALEXIS. He follows with Dr. Leonardo Hebert as his pulmonologist.    Blood pressure 131/80, pulse 76, temperature 97.7 °F (36.5 °C), temperature source Temporal, resp. rate 22, height 6' (1.829 m), weight (!) 363 lb (164.7 kg), SpO2 92 %. On 3 L of oxygen    HEENT WNL        Heart- S1S2 distant due to body habitus. Regular rhythm    Lungs- clear with some fine crackles  bilaterally in the lower lobes    abd non-tender      No edema    Pulses intact     Assessment and plan  Diabetes type 2- continue Novolin 70/30 55 units twice daily. Increase NovoLog to 10 units twice a day after meals  This regimen is atypical, but it was implemented by the pharmacist because of the patient's inability to pay for other more standard regimens. Hypertension- continue current regimen as blood pressure is controlled. COPD/emphysema continue current regiment. Patient to follow-up in 2 months for diabetes follow-up. Attending Physician Statement  I have discussed the case, including pertinent history and exam findings with the resident.  I agree with the documented assessment and plan.

## 2020-11-03 NOTE — TELEPHONE ENCOUNTER
Last Appointment:  9/25/2020  Future Appointments   Date Time Provider Alex Anders   11/24/2020  1:40 PM [facility]

## 2020-11-24 NOTE — PROGRESS NOTES
S: 61 y.o. male here for dm2 follow up. He is now talking novolin 70/30 55 units bid and takes 20 units of novolog with bg is greater than 200 with meals. Had 1 low bg at 74. No symptoms. Checking bg 2-3 times per day. Still with paresthesias on gabapentin. O: VS: BP (!) 147/80 (Site: Left Upper Arm, Position: Sitting, Cuff Size: Large Adult)   Pulse 80   Temp 97.7 °F (36.5 °C) (Temporal)   Ht 6' (1.829 m)   Wt (!) 376 lb (170.6 kg)   SpO2 90%   BMI 50.99 kg/m²    General: NAD   CV:  RRR, no gallops, rubs, or murmurs   Resp: CTAB no R/R/W   Abd:  Soft, nontender, no masses    Ext:  no C/C/E  Impression/Plan:   1. Dm2-last a1c 10.2, today 6.9.  2. HTN-increase losartan to 50  3. Peripheral neuropathy-increase gabapentin dose    rto in 2-3 months    Attending Physician Statement  I have discussed the case, including pertinent history and exam findings with the resident. I agree with the documented assessment and plan.         Tori Grant MD

## 2020-11-24 NOTE — PROGRESS NOTES
[facility] MEDICINE RESIDENCY PROGRAM  DATE OF VISIT : 2020    Patient : Lenard Arora   Age : 61 y.o.  : 1960   MRN : [id number]   ______________________________________________________________________    Chief Complaint :   Chief Complaint   Patient presents with    Diabetes     F/U       HPI : Lenard Arora is 61 y.o. male who presented to the clinic today for follow up of diabetes mellitus. He denies polyuria, polydipsia, chest pain, palpitations, dizziness, syncope, fever, and chills. He is sometimes SOB and wears 6L O2 continuous, but is currently on 3L. He does report occasional numbness and tingling in his feet. He takes gabapentin for pain and feels that it could do more. He reports some weight gain. He denies depression. He checks his blood glucose 2-3 times day. He reports 3-4 weeks ago one reading at 76. He takes Novolin 79/30 BID, and also Novolog 20 U once in the morning if his BG is greater than 200 before breakfast. This regimen was implemented due to difficulty with insurance covering other insulin. Patient does report insomnia and states he cannot fall asleep unless he takes benadryl, trazodone, and Remeron. BP is elevated today, and has been previously. Patient also needs refills today. Past Medical History :  Past Medical History:   Diagnosis Date    Anxiety     Asthma     CAD (coronary artery disease)     2 stents in  .  [facility]est[facility]     Chills     Chronic sinusitis     Cocaine abuse ([facility] [place] [address]     COPD (chronic obstructive pulmonary disease) (HCC)     Depression     Diabetes mellitus (HCC)     on insulin    GERD (gastroesophageal reflux disease)     H/O cardiovascular stress test     Hand fracture 3/2010    Fractured both hands.  Hepatitis C     Work up was negative. Saw specialist. Negative viral load.      History of tobacco abuse     Hyperlipidemia     Hypertension     Night sweats     NSTEMI (non-ST elevation myocardial infarction) ([facility] [address] 3/2010    Hospitalized.  Obesity     Oxygen dependent     Panic attacks     Pneumonia 3/2010     LLL.  Sleep apnea     Has CPAP machine    SOB (shortness of breath)     does not know settings     Past Surgical History:   Procedure Laterality Date    COLONOSCOPY  2011    COLONOSCOPY  9/23/15   [facility] DI[facility]NOSTIC [facility]AC CATH LAB PROCEDURE  2/25/2009    [facility], cardiac cath/primary BM stent in proximal LAD.  OTHER SURGICAL HISTORY      anal fistula    TONSILLECTOMY         Allergies : Allergies   Allergen Reactions    Amoxicillin Hives and Shortness Of Breath    Penicillins Hives and Shortness Of Breath     TRIAMOX       Medication List :    Current Outpatient Medications   Medication Sig Dispense Refill    losartan (COZAAR) 25 MG tablet Take 1 tablet by mouth daily 90 tablet 0    insulin aspart (NOVOLOG PENFILL) 100 UNIT/ML injection cartridge Inject 20 Units into the skin daily 5 Cartridge 3    diphenhydrAMINE (BENADRYL) 50 MG capsule Take 2 capsules by mouth nightly 60 capsule 0    insulin 70-30 (NOVOLIN 70/30) (70-30) 100 UNIT per ML injection vial Inject 55 Units into the skin 2 times daily 8 vial 3    albuterol (PROVENTIL) (2.5 MG/3ML) 0.083% nebulizer solution inhale contents of 1 vial in nebulizer four times a day 120 each 5    gabapentin (NEURONTIN) 300 MG capsule Take 1 capsule by mouth 3 times daily for 180 days.  Intended supply: 90 days 270 capsule 1    atorvastatin (LIPITOR) 80 MG tablet take 1 tablet by mouth once daily 90 tablet 1    promethazine (PHENERGAN) 25 MG tablet Take 1 tablet by mouth every 6 hours as needed for Nausea 30 tablet 3    B-D INS SYR ULTRAFINE 1CC/30G 30G X 1/2\" 1 ML MISC 1 each by Does not apply route 3 times daily      finasteride (PROSCAR) 5 MG tablet Take 1 tablet by mouth daily 90 tablet 3    mirtazapine (REMERON) 30 MG tablet take 1 tablet by mouth every evening 60 tablet 3    traZODone (DESYREL) 100 MG tablet Take 3 tablets by mouth nightly 90 tablet 3    docusate sodium (DOCQLACE) 100 MG capsule Take 1 capsule by mouth 2 times daily 60 capsule 5    atenolol (TENORMIN) 50 MG tablet take 1 tablet by mouth twice a day 180 tablet 1    metFORMIN (GLUCOPHAGE) 1000 MG tablet take 1 tablet by mouth twice a day with meals 180 tablet 1    amLODIPine (NORVASC) 10 MG tablet take 1 tablet by mouth once daily 90 tablet 1    aspirin 81 MG EC tablet Take 1 tablet by mouth daily 90 tablet 1    Omega-3 Fatty Acids (FISH OIL) 1000 MG CAPS Take 1 capsule by mouth daily 90 capsule 1    omeprazole (PRILOSEC) 20 MG delayed release capsule Take 1 capsule by mouth daily 90 capsule 1    Lancets MISC Check sugars as directed. Meme Valentin for pharmacy to substitute 100 each 5    Insulin Syringes, Disposable, U-100 1 ML MISC 1 each by Does not apply route daily 100 each 3    hydroCHLOROthiazide (HYDRODIURIL) 25 MG tablet Take 1 tablet by mouth daily 90 tablet 1    tamsulosin (FLOMAX) 0.4 MG capsule take 1 capsule by mouth once daily 90 capsule 1    blood glucose monitor strips Test 3 times a day & as needed for symptoms of irregular blood glucose. Dispense sufficient amount for indicated testing frequency plus additional to accommodate PRN testing needs. 300 strip 1    Handicap Placard MISC by Does not apply route 1 each 0    glucose monitoring kit (FREESTYLE) monitoring kit 1 kit by Does not apply route daily 1 kit 0    beclomethasone (QVAR) 80 MCG/ACT inhaler Inhale 2 puffs into the lungs 2 times daily      OXYGEN Inhale 6 L into the lungs continuous       Ipratropium-Albuterol (COMBIVENT IN) Inhale 2 puffs into the lungs 2 times daily        No current facility-administered medications for this visit. Review of Systems :  Review of Systems   Respiratory: Positive for shortness of breath (occasional). Cardiovascular: Negative for chest pain and palpitations. Endocrine: Negative for polydipsia and polyuria. Neurological: Positive for numbness (and tingling of feet). Negative for dizziness and syncope. Psychiatric/Behavioral: Positive for sleep disturbance (insomnia). ______________________________________________________________________    Physical Exam :    Vitals: BP (!) 147/80 (Site: Left Upper Arm, Position: Sitting, Cuff Size: Large Adult)   Pulse 80   Temp 97.7 °F (36.5 °C) (Temporal)   Ht 6' (1.829 m)   Wt (!) 376 lb (170.6 kg)   SpO2 90%   BMI 50.99 kg/m²   General Appearance: Well developed, awake, alert, oriented, and in NAD  HEENT: NCAT, MMM, no pallor or icterus. Neck: Symmetrical, trachea midline. Chest wall/Lung: CTAB, respirations unlabored. No ronchi/wheezing/rales   Heart: RRR, normal S1 and S2, no murmurs, rubs or gallops. Abdomen: SNTND  Extremities: Extremities normal, atraumatic, no cyanosis, clubbing or edema. Skin: Skin color, texture, turgor normal, no rashes or lesions  Musculokeletal: ROM grossly normal in all joints of extremities, no obvious joint swelling. Neurologic: Alert&Oriented x3. No focal motor deficits detected. Psychiatric: Normal mood. Normal affect. Normal behavior. ______________________________________________________________________    Assessment & Plan :    Diabetes mellitus type 2 in obese (HCC)  · Refill:  - insulin aspart (NOVOLOG PENFILL) 100 UNIT/ML injection cartridge; Inject 20 Units into the skin daily  Dispense: 5 Cartridge; Refill: 3  - insulin 70-30 (NOVOLIN 70/30) (70-30) 100 UNIT per ML injection vial; Inject 55 Units into the skin 2 times daily  Dispense: 8 vial; Refill: 3  - POCT glycosylated hemoglobin (Hb A1C) 6.9 today  - POCT Glucose    Essential hypertension  · Refill:  - losartan (COZAAR) 25 MG tablet; Take 1 tablet by mouth daily  Dispense: 90 tablet;  Refill: 0    Pulmonary emphysema, unspecified emphysema type (HCC)  · Refill:  - albuterol (PROVENTIL) (2.5 MG/3ML) 0.083% nebulizer solution; inhale contents of 1 vial in nebulizer four times a day  Dispense: 120 each; Refill: 5    Other insomnia  · Discussed with patient that it is not recommended to take so many sedating medications. Since gabapentin is being increased, encouraged him to cut down on benadryl for sleep  · Discussed sleep hygiene   - diphenhydrAMINE (BENADRYL) 50 MG capsule; Take 2 capsules by mouth nightly  Dispense: 60 capsule; Refill: 0    Neuropathy  · Increased from 200 mg BID to 300 mg TID  - gabapentin (NEURONTIN) 300 MG capsule; Take 1 capsule by mouth 3 times daily for 180 days. Intended supply: 90 days  Dispense: 270 capsule; Refill: 1      Additional plan and future considerations:       Return to Office: Return in about 3 months (around 2/24/2021) for HTN, insomnia.     Geraldine De Leon DO   Case discussed with Dr. Marco Correia

## 2020-11-30 NOTE — PROGRESS NOTES
Called patient and discussed that taking benadryl with Remeron and trazodone is not a good option for insomnia. He agrees to stop benadryl if trazodone is increased. Trazodone increased from 300 mg HS to 400 mg HS. Instructed him to call with any concerns.

## 2021-01-01 ENCOUNTER — OFFICE VISIT (OUTPATIENT)
Dept: CARDIOLOGY CLINIC | Age: 61
End: 2021-01-01
Payer: MEDICARE

## 2021-01-01 ENCOUNTER — HOSPITAL ENCOUNTER (INPATIENT)
Age: 61
LOS: 7 days | Discharge: HOME | DRG: 987 | End: 2021-08-10
Attending: EMERGENCY MEDICINE | Admitting: FAMILY MEDICINE
Payer: MEDICARE

## 2021-01-01 ENCOUNTER — TELEPHONE (OUTPATIENT)
Dept: CASE MANAGEMENT | Age: 61
End: 2021-01-01

## 2021-01-01 ENCOUNTER — APPOINTMENT (OUTPATIENT)
Dept: NUCLEAR MEDICINE | Age: 61
DRG: 987 | End: 2021-01-01
Payer: MEDICARE

## 2021-01-01 ENCOUNTER — APPOINTMENT (OUTPATIENT)
Dept: CT IMAGING | Age: 61
DRG: 987 | End: 2021-01-01
Payer: MEDICARE

## 2021-01-01 ENCOUNTER — OFFICE VISIT (OUTPATIENT)
Dept: FAMILY MEDICINE CLINIC | Age: 61
End: 2021-01-01
Payer: MEDICARE

## 2021-01-01 ENCOUNTER — TELEPHONE (OUTPATIENT)
Dept: CARDIOLOGY CLINIC | Age: 61
End: 2021-01-01

## 2021-01-01 ENCOUNTER — IMMUNIZATION (OUTPATIENT)
Dept: PRIMARY CARE CLINIC | Age: 61
End: 2021-01-01
Payer: MEDICARE

## 2021-01-01 ENCOUNTER — TELEPHONE (OUTPATIENT)
Dept: FAMILY MEDICINE CLINIC | Age: 61
End: 2021-01-01

## 2021-01-01 ENCOUNTER — HOSPITAL ENCOUNTER (OUTPATIENT)
Dept: CT IMAGING | Age: 61
Discharge: HOME | DRG: 987 | End: 2021-08-04
Payer: MEDICARE

## 2021-01-01 ENCOUNTER — APPOINTMENT (OUTPATIENT)
Dept: GENERAL RADIOLOGY | Age: 61
DRG: 987 | End: 2021-01-01
Payer: MEDICARE

## 2021-01-01 ENCOUNTER — HOSPITAL ENCOUNTER (OUTPATIENT)
Age: 61
Discharge: HOME | End: 2021-01-08
Payer: MEDICARE

## 2021-01-01 ENCOUNTER — APPOINTMENT (OUTPATIENT)
Dept: NON INVASIVE DIAGNOSTICS | Age: 61
DRG: 987 | End: 2021-01-01
Payer: MEDICARE

## 2021-01-01 VITALS
OXYGEN SATURATION: 93 % | SYSTOLIC BLOOD PRESSURE: 132 MMHG | DIASTOLIC BLOOD PRESSURE: 80 MMHG | BODY MASS INDEX: 42.66 KG/M2 | RESPIRATION RATE: 26 BRPM | HEIGHT: 72 IN | HEART RATE: 85 BPM | WEIGHT: 315 LBS

## 2021-01-01 VITALS
WEIGHT: 315 LBS | BODY MASS INDEX: 42.66 KG/M2 | OXYGEN SATURATION: 88 % | RESPIRATION RATE: 26 BRPM | DIASTOLIC BLOOD PRESSURE: 72 MMHG | HEART RATE: 85 BPM | HEIGHT: 72 IN | BODY MASS INDEX: 42.66 KG/M2 | SYSTOLIC BLOOD PRESSURE: 118 MMHG | DIASTOLIC BLOOD PRESSURE: 74 MMHG | TEMPERATURE: 97.1 F | HEIGHT: 72 IN | SYSTOLIC BLOOD PRESSURE: 134 MMHG | HEART RATE: 80 BPM | WEIGHT: 315 LBS

## 2021-01-01 VITALS
HEART RATE: 76 BPM | HEIGHT: 72 IN | TEMPERATURE: 97.2 F | OXYGEN SATURATION: 94 % | BODY MASS INDEX: 42.66 KG/M2 | WEIGHT: 315 LBS

## 2021-01-01 VITALS
BODY MASS INDEX: 42.66 KG/M2 | DIASTOLIC BLOOD PRESSURE: 84 MMHG | HEIGHT: 72 IN | RESPIRATION RATE: 26 BRPM | SYSTOLIC BLOOD PRESSURE: 126 MMHG | WEIGHT: 315 LBS | OXYGEN SATURATION: 92 % | TEMPERATURE: 97.6 F | HEART RATE: 100 BPM

## 2021-01-01 VITALS
OXYGEN SATURATION: 90 % | DIASTOLIC BLOOD PRESSURE: 70 MMHG | BODY MASS INDEX: 42.66 KG/M2 | WEIGHT: 315 LBS | HEART RATE: 72 BPM | HEIGHT: 72 IN | TEMPERATURE: 98 F | SYSTOLIC BLOOD PRESSURE: 140 MMHG

## 2021-01-01 VITALS
WEIGHT: 315 LBS | BODY MASS INDEX: 42.66 KG/M2 | TEMPERATURE: 97.7 F | DIASTOLIC BLOOD PRESSURE: 76 MMHG | HEART RATE: 75 BPM | RESPIRATION RATE: 20 BRPM | OXYGEN SATURATION: 94 % | HEIGHT: 72 IN | SYSTOLIC BLOOD PRESSURE: 120 MMHG

## 2021-01-01 DIAGNOSIS — G47.09 OTHER INSOMNIA: Primary | ICD-10-CM

## 2021-01-01 DIAGNOSIS — R94.31 EKG, ABNORMAL: ICD-10-CM

## 2021-01-01 DIAGNOSIS — J30.9 ALLERGIC RHINITIS, UNSPECIFIED SEASONALITY, UNSPECIFIED TRIGGER: ICD-10-CM

## 2021-01-01 DIAGNOSIS — G47.00 INSOMNIA, UNSPECIFIED TYPE: ICD-10-CM

## 2021-01-01 DIAGNOSIS — S91.311S LACERATION OF RIGHT FOOT, SEQUELA: ICD-10-CM

## 2021-01-01 DIAGNOSIS — G47.09 OTHER INSOMNIA: ICD-10-CM

## 2021-01-01 DIAGNOSIS — J18.9 PNEUMONIA DUE TO INFECTIOUS ORGANISM, UNSPECIFIED LATERALITY, UNSPECIFIED PART OF LUNG: ICD-10-CM

## 2021-01-01 DIAGNOSIS — I50.9 ACUTE ON CHRONIC CONGESTIVE HEART FAILURE, UNSPECIFIED HEART FAILURE TYPE (HCC): ICD-10-CM

## 2021-01-01 DIAGNOSIS — E66.9 DIABETES MELLITUS TYPE 2 IN OBESE (HCC): ICD-10-CM

## 2021-01-01 DIAGNOSIS — E11.69 DIABETES MELLITUS TYPE 2 IN OBESE (HCC): ICD-10-CM

## 2021-01-01 DIAGNOSIS — E11.69 DIABETES MELLITUS TYPE 2 IN OBESE (HCC): Primary | ICD-10-CM

## 2021-01-01 DIAGNOSIS — R35.0 BENIGN PROSTATIC HYPERPLASIA WITH URINARY FREQUENCY: ICD-10-CM

## 2021-01-01 DIAGNOSIS — Z76.0 MEDICATION REFILL: ICD-10-CM

## 2021-01-01 DIAGNOSIS — E66.9 DIABETES MELLITUS TYPE 2 IN OBESE (HCC): Primary | ICD-10-CM

## 2021-01-01 DIAGNOSIS — N40.1 BENIGN PROSTATIC HYPERPLASIA WITH URINARY FREQUENCY: ICD-10-CM

## 2021-01-01 DIAGNOSIS — J43.9 PULMONARY EMPHYSEMA, UNSPECIFIED EMPHYSEMA TYPE (HCC): Chronic | ICD-10-CM

## 2021-01-01 DIAGNOSIS — R79.89 ELEVATED SERUM CREATININE: ICD-10-CM

## 2021-01-01 DIAGNOSIS — R55 SYNCOPE AND COLLAPSE: Primary | ICD-10-CM

## 2021-01-01 DIAGNOSIS — G62.9 NEUROPATHY: ICD-10-CM

## 2021-01-01 DIAGNOSIS — F17.211 CIGARETTE NICOTINE DEPENDENCE IN REMISSION: ICD-10-CM

## 2021-01-01 DIAGNOSIS — J43.9 PULMONARY EMPHYSEMA, UNSPECIFIED EMPHYSEMA TYPE (HCC): Primary | ICD-10-CM

## 2021-01-01 DIAGNOSIS — F32.A DEPRESSION, UNSPECIFIED DEPRESSION TYPE: ICD-10-CM

## 2021-01-01 DIAGNOSIS — R06.02 SOB (SHORTNESS OF BREATH): ICD-10-CM

## 2021-01-01 DIAGNOSIS — I25.119 CORONARY ARTERY DISEASE WITH ANGINA PECTORIS, UNSPECIFIED VESSEL OR LESION TYPE, UNSPECIFIED WHETHER NATIVE OR TRANSPLANTED HEART (HCC): Primary | ICD-10-CM

## 2021-01-01 DIAGNOSIS — E66.01 MORBID OBESITY (HCC): ICD-10-CM

## 2021-01-01 DIAGNOSIS — I10 ESSENTIAL HYPERTENSION: Chronic | ICD-10-CM

## 2021-01-01 DIAGNOSIS — Z00.00 ROUTINE GENERAL MEDICAL EXAMINATION AT A HEALTH CARE FACILITY: Primary | ICD-10-CM

## 2021-01-01 DIAGNOSIS — I10 ESSENTIAL HYPERTENSION: Primary | ICD-10-CM

## 2021-01-01 DIAGNOSIS — G62.9 NEUROPATHY: Primary | ICD-10-CM

## 2021-01-01 LAB
ADENOVIRUS BY PCR: NOT DETECTED
ALBUMIN SERPL-MCNC: 3.6 G/DL (ref 3.5–5.2)
ALBUMIN SERPL-MCNC: 3.9 G/DL (ref 3.5–5.2)
ALP BLD-CCNC: 43 U/L (ref 40–129)
ALP BLD-CCNC: 56 U/L (ref 40–129)
ALT SERPL-CCNC: 20 U/L (ref 0–40)
ALT SERPL-CCNC: 25 U/L (ref 0–40)
AMPHETAMINE SCREEN, URINE: NOT DETECTED
ANION GAP SERPL CALCULATED.3IONS-SCNC: 10 MMOL/L (ref 7–16)
ANION GAP SERPL CALCULATED.3IONS-SCNC: 11 MMOL/L (ref 7–16)
ANION GAP SERPL CALCULATED.3IONS-SCNC: 12 MMOL/L (ref 7–16)
ANION GAP SERPL CALCULATED.3IONS-SCNC: 13 MMOL/L (ref 7–16)
ANION GAP SERPL CALCULATED.3IONS-SCNC: 8 MMOL/L (ref 7–16)
ANION GAP SERPL CALCULATED.3IONS-SCNC: 9 MMOL/L (ref 7–16)
ANION GAP SERPL CALCULATED.3IONS-SCNC: 9 MMOL/L (ref 7–16)
AST SERPL-CCNC: 17 U/L (ref 0–39)
AST SERPL-CCNC: 19 U/L (ref 0–39)
BACTERIA: NORMAL /HPF
BARBITURATE SCREEN URINE: NOT DETECTED
BASOPHILS ABSOLUTE: 0.05 E9/L (ref 0–0.2)
BASOPHILS ABSOLUTE: 0.06 E9/L (ref 0–0.2)
BASOPHILS ABSOLUTE: 0.07 E9/L (ref 0–0.2)
BASOPHILS ABSOLUTE: 0.07 E9/L (ref 0–0.2)
BASOPHILS ABSOLUTE: 0.08 E9/L (ref 0–0.2)
BASOPHILS ABSOLUTE: 0.1 E9/L (ref 0–0.2)
BASOPHILS RELATIVE PERCENT: 0.5 % (ref 0–2)
BASOPHILS RELATIVE PERCENT: 0.5 % (ref 0–2)
BASOPHILS RELATIVE PERCENT: 0.6 % (ref 0–2)
BASOPHILS RELATIVE PERCENT: 0.7 % (ref 0–2)
BASOPHILS RELATIVE PERCENT: 0.7 % (ref 0–2)
BASOPHILS RELATIVE PERCENT: 0.9 % (ref 0–2)
BENZODIAZEPINE SCREEN, URINE: NOT DETECTED
BILIRUB SERPL-MCNC: 0.3 MG/DL (ref 0–1.2)
BILIRUB SERPL-MCNC: 0.3 MG/DL (ref 0–1.2)
BILIRUBIN URINE: NEGATIVE
BLOOD, URINE: NEGATIVE
BORDETELLA PARAPERTUSSIS BY PCR: NOT DETECTED
BORDETELLA PERTUSSIS BY PCR: NOT DETECTED
BUN BLDV-MCNC: 21 MG/DL (ref 6–23)
BUN BLDV-MCNC: 21 MG/DL (ref 8–23)
BUN BLDV-MCNC: 22 MG/DL (ref 6–23)
BUN BLDV-MCNC: 22 MG/DL (ref 6–23)
BUN BLDV-MCNC: 23 MG/DL (ref 6–23)
BUN BLDV-MCNC: 24 MG/DL (ref 6–23)
BUN BLDV-MCNC: 26 MG/DL (ref 6–23)
BUN BLDV-MCNC: 27 MG/DL (ref 6–23)
BUN BLDV-MCNC: 30 MG/DL (ref 6–23)
BUN BLDV-MCNC: 31 MG/DL (ref 6–23)
CALCIUM SERPL-MCNC: 8.8 MG/DL (ref 8.6–10.2)
CALCIUM SERPL-MCNC: 9 MG/DL (ref 8.6–10.2)
CALCIUM SERPL-MCNC: 9.2 MG/DL (ref 8.6–10.2)
CALCIUM SERPL-MCNC: 9.3 MG/DL (ref 8.6–10.2)
CALCIUM SERPL-MCNC: 9.3 MG/DL (ref 8.6–10.2)
CALCIUM SERPL-MCNC: 9.6 MG/DL (ref 8.6–10.2)
CALCIUM SERPL-MCNC: 9.9 MG/DL (ref 8.6–10.2)
CANNABINOID SCREEN URINE: NOT DETECTED
CHLAMYDOPHILIA PNEUMONIAE BY PCR: NOT DETECTED
CHLORIDE BLD-SCNC: 100 MMOL/L (ref 98–107)
CHLORIDE BLD-SCNC: 101 MMOL/L (ref 98–107)
CHLORIDE BLD-SCNC: 102 MMOL/L (ref 98–107)
CHLORIDE BLD-SCNC: 103 MMOL/L (ref 98–107)
CHLORIDE BLD-SCNC: 104 MMOL/L (ref 98–107)
CHLORIDE BLD-SCNC: 105 MMOL/L (ref 98–107)
CHLORIDE BLD-SCNC: 105 MMOL/L (ref 98–107)
CHLORIDE BLD-SCNC: 106 MMOL/L (ref 98–107)
CHLORIDE BLD-SCNC: 99 MMOL/L (ref 98–107)
CHLORIDE BLD-SCNC: 99 MMOL/L (ref 98–107)
CHLORIDE URINE RANDOM: 102 MMOL/L
CHLORIDE URINE RANDOM: 139 MMOL/L
CHP ED QC CHECK: YES
CLARITY: CLEAR
CO2: 26 MMOL/L (ref 22–29)
CO2: 27 MMOL/L (ref 22–29)
CO2: 28 MMOL/L (ref 22–29)
CO2: 29 MMOL/L (ref 22–29)
CO2: 30 MMOL/L (ref 22–29)
CO2: 31 MMOL/L (ref 22–29)
CO2: 31 MMOL/L (ref 22–29)
COCAINE METABOLITE SCREEN URINE: NOT DETECTED
COLOR: YELLOW
CORONAVIRUS 229E BY PCR: NOT DETECTED
CORONAVIRUS HKU1 BY PCR: NOT DETECTED
CORONAVIRUS NL63 BY PCR: NOT DETECTED
CORONAVIRUS OC43 BY PCR: NOT DETECTED
CREAT SERPL-MCNC: 1.4 MG/DL (ref 0.7–1.2)
CREAT SERPL-MCNC: 1.7 MG/DL (ref 0.7–1.2)
CREAT SERPL-MCNC: 1.8 MG/DL (ref 0.7–1.2)
CREAT SERPL-MCNC: 2.1 MG/DL (ref 0.7–1.2)
CREATININE URINE: 118 MG/DL (ref 40–278)
CREATININE URINE: 145 MG/DL (ref 40–278)
EKG ATRIAL RATE: 60 BPM
EKG ATRIAL RATE: 71 BPM
EKG P AXIS: 12 DEGREES
EKG P AXIS: 39 DEGREES
EKG P-R INTERVAL: 332 MS
EKG P-R INTERVAL: 356 MS
EKG Q-T INTERVAL: 440 MS
EKG Q-T INTERVAL: 474 MS
EKG QRS DURATION: 136 MS
EKG QRS DURATION: 138 MS
EKG QTC CALCULATION (BAZETT): 474 MS
EKG QTC CALCULATION (BAZETT): 478 MS
EKG R AXIS: -49 DEGREES
EKG R AXIS: -51 DEGREES
EKG T AXIS: 148 DEGREES
EKG T AXIS: 150 DEGREES
EKG VENTRICULAR RATE: 60 BPM
EKG VENTRICULAR RATE: 71 BPM
EOSINOPHILS ABSOLUTE: 0.08 E9/L (ref 0.05–0.5)
EOSINOPHILS ABSOLUTE: 0.19 E9/L (ref 0.05–0.5)
EOSINOPHILS ABSOLUTE: 0.21 E9/L (ref 0.05–0.5)
EOSINOPHILS ABSOLUTE: 0.21 E9/L (ref 0.05–0.5)
EOSINOPHILS ABSOLUTE: 0.22 E9/L (ref 0.05–0.5)
EOSINOPHILS ABSOLUTE: 0.22 E9/L (ref 0.05–0.5)
EOSINOPHILS ABSOLUTE: 0.25 E9/L (ref 0.05–0.5)
EOSINOPHILS ABSOLUTE: 0.29 E9/L (ref 0.05–0.5)
EOSINOPHILS RELATIVE PERCENT: 0.8 % (ref 0–6)
EOSINOPHILS RELATIVE PERCENT: 1.6 % (ref 0–6)
EOSINOPHILS RELATIVE PERCENT: 1.8 % (ref 0–6)
EOSINOPHILS RELATIVE PERCENT: 2 % (ref 0–6)
EOSINOPHILS RELATIVE PERCENT: 2.1 % (ref 0–6)
EOSINOPHILS RELATIVE PERCENT: 2.2 % (ref 0–6)
EOSINOPHILS RELATIVE PERCENT: 2.2 % (ref 0–6)
EOSINOPHILS RELATIVE PERCENT: 2.5 % (ref 0–6)
FENTANYL SCREEN, URINE: POSITIVE
GFR AFRICAN AMERICAN: 39
GFR AFRICAN AMERICAN: 47
GFR AFRICAN AMERICAN: 50
GFR AFRICAN AMERICAN: >60
GFR NON-AFRICAN AMERICAN: 32 ML/MIN/1.73
GFR NON-AFRICAN AMERICAN: 39 ML/MIN/1.73
GFR NON-AFRICAN AMERICAN: 41 ML/MIN/1.73
GFR NON-AFRICAN AMERICAN: 52 ML/MIN/1.73
GLUCOSE BLD-MCNC: 120 MG/DL (ref 74–99)
GLUCOSE BLD-MCNC: 138 MG/DL
GLUCOSE BLD-MCNC: 139 MG/DL (ref 74–99)
GLUCOSE BLD-MCNC: 139 MG/DL (ref 74–99)
GLUCOSE BLD-MCNC: 143 MG/DL (ref 74–99)
GLUCOSE BLD-MCNC: 144 MG/DL (ref 74–99)
GLUCOSE BLD-MCNC: 155 MG/DL (ref 74–99)
GLUCOSE BLD-MCNC: 162 MG/DL (ref 74–99)
GLUCOSE BLD-MCNC: 174 MG/DL (ref 74–99)
GLUCOSE BLD-MCNC: 192 MG/DL (ref 74–99)
GLUCOSE BLD-MCNC: 215 MG/DL (ref 74–99)
GLUCOSE URINE: NEGATIVE MG/DL
HBA1C MFR BLD: 6.3 % (ref 4–5.6)
HCT VFR BLD CALC: 43 % (ref 37–54)
HCT VFR BLD CALC: 44.6 % (ref 37–54)
HCT VFR BLD CALC: 45 % (ref 37–54)
HCT VFR BLD CALC: 46 % (ref 37–54)
HCT VFR BLD CALC: 46.4 % (ref 37–54)
HCT VFR BLD CALC: 46.6 % (ref 37–54)
HCT VFR BLD CALC: 46.9 % (ref 37–54)
HCT VFR BLD CALC: 47.5 % (ref 37–54)
HCT VFR BLD CALC: 47.7 % (ref 37–54)
HCV QNT BY NAAT IU/ML: NOT DETECTED IU/ML
HCV QNT BY NAAT IU/ML: NOT DETECTED IU/ML
HCV QNT BY NAAT LOG IU/ML: NOT DETECTED LOG IU/ML
HCV QNT BY NAAT LOG IU/ML: NOT DETECTED LOG IU/ML
HEMOGLOBIN: 13.1 G/DL (ref 12.5–16.5)
HEMOGLOBIN: 13.6 G/DL (ref 12.5–16.5)
HEMOGLOBIN: 14 G/DL (ref 12.5–16.5)
HEMOGLOBIN: 14.2 G/DL (ref 12.5–16.5)
HEMOGLOBIN: 14.4 G/DL (ref 12.5–16.5)
HUMAN METAPNEUMOVIRUS BY PCR: NOT DETECTED
HUMAN RHINOVIRUS/ENTEROVIRUS BY PCR: NOT DETECTED
IMMATURE GRANULOCYTES #: 0.04 E9/L
IMMATURE GRANULOCYTES #: 0.05 E9/L
IMMATURE GRANULOCYTES #: 0.06 E9/L
IMMATURE GRANULOCYTES #: 0.06 E9/L
IMMATURE GRANULOCYTES #: 0.07 E9/L
IMMATURE GRANULOCYTES #: 0.08 E9/L
IMMATURE GRANULOCYTES %: 0.3 % (ref 0–5)
IMMATURE GRANULOCYTES %: 0.4 % (ref 0–5)
IMMATURE GRANULOCYTES %: 0.4 % (ref 0–5)
IMMATURE GRANULOCYTES %: 0.5 % (ref 0–5)
IMMATURE GRANULOCYTES %: 0.6 % (ref 0–5)
INFLUENZA A BY PCR: NOT DETECTED
INFLUENZA B BY PCR: NOT DETECTED
INTERPRETATION: NOT DETECTED
INTERPRETATION: NOT DETECTED
KETONES, URINE: NEGATIVE MG/DL
LACTIC ACID: 1.5 MMOL/L (ref 0.5–2.2)
LACTIC ACID: 4.1 MMOL/L (ref 0.5–2.2)
LEUKOCYTE ESTERASE, URINE: NEGATIVE
LV EF: 48 %
LVEF MODALITY: NORMAL
LYMPHOCYTES ABSOLUTE: 1.69 E9/L (ref 1.5–4)
LYMPHOCYTES ABSOLUTE: 1.71 E9/L (ref 1.5–4)
LYMPHOCYTES ABSOLUTE: 1.77 E9/L (ref 1.5–4)
LYMPHOCYTES ABSOLUTE: 1.88 E9/L (ref 1.5–4)
LYMPHOCYTES ABSOLUTE: 1.96 E9/L (ref 1.5–4)
LYMPHOCYTES ABSOLUTE: 1.97 E9/L (ref 1.5–4)
LYMPHOCYTES ABSOLUTE: 2.03 E9/L (ref 1.5–4)
LYMPHOCYTES ABSOLUTE: 2.07 E9/L (ref 1.5–4)
LYMPHOCYTES RELATIVE PERCENT: 12.8 % (ref 20–42)
LYMPHOCYTES RELATIVE PERCENT: 16.1 % (ref 20–42)
LYMPHOCYTES RELATIVE PERCENT: 17.1 % (ref 20–42)
LYMPHOCYTES RELATIVE PERCENT: 17.1 % (ref 20–42)
LYMPHOCYTES RELATIVE PERCENT: 17.6 % (ref 20–42)
LYMPHOCYTES RELATIVE PERCENT: 18.1 % (ref 20–42)
LYMPHOCYTES RELATIVE PERCENT: 18.8 % (ref 20–42)
LYMPHOCYTES RELATIVE PERCENT: 20 % (ref 20–42)
Lab: ABNORMAL
MCH RBC QN AUTO: 26.6 PG (ref 26–35)
MCH RBC QN AUTO: 26.7 PG (ref 26–35)
MCH RBC QN AUTO: 26.9 PG (ref 26–35)
MCH RBC QN AUTO: 27.1 PG (ref 26–35)
MCH RBC QN AUTO: 27.2 PG (ref 26–35)
MCH RBC QN AUTO: 27.3 PG (ref 26–35)
MCH RBC QN AUTO: 27.3 PG (ref 26–35)
MCH RBC QN AUTO: 27.4 PG (ref 26–35)
MCH RBC QN AUTO: 27.4 PG (ref 26–35)
MCHC RBC AUTO-ENTMCNC: 29.9 % (ref 32–34.5)
MCHC RBC AUTO-ENTMCNC: 29.9 % (ref 32–34.5)
MCHC RBC AUTO-ENTMCNC: 30.2 % (ref 32–34.5)
MCHC RBC AUTO-ENTMCNC: 30.2 % (ref 32–34.5)
MCHC RBC AUTO-ENTMCNC: 30.5 % (ref 32–34.5)
MCHC RBC AUTO-ENTMCNC: 30.5 % (ref 32–34.5)
MCHC RBC AUTO-ENTMCNC: 30.9 % (ref 32–34.5)
MCHC RBC AUTO-ENTMCNC: 31.1 % (ref 32–34.5)
MCHC RBC AUTO-ENTMCNC: 31.3 % (ref 32–34.5)
MCV RBC AUTO: 87.2 FL (ref 80–99.9)
MCV RBC AUTO: 87.6 FL (ref 80–99.9)
MCV RBC AUTO: 88.1 FL (ref 80–99.9)
MCV RBC AUTO: 89 FL (ref 80–99.9)
MCV RBC AUTO: 89.2 FL (ref 80–99.9)
MCV RBC AUTO: 89.3 FL (ref 80–99.9)
MCV RBC AUTO: 89.6 FL (ref 80–99.9)
MCV RBC AUTO: 89.7 FL (ref 80–99.9)
MCV RBC AUTO: 90.4 FL (ref 80–99.9)
METER GLUCOSE: 118 MG/DL (ref 74–99)
METER GLUCOSE: 120 MG/DL (ref 74–99)
METER GLUCOSE: 129 MG/DL (ref 74–99)
METER GLUCOSE: 134 MG/DL (ref 74–99)
METER GLUCOSE: 135 MG/DL (ref 74–99)
METER GLUCOSE: 138 MG/DL (ref 74–99)
METER GLUCOSE: 139 MG/DL (ref 74–99)
METER GLUCOSE: 140 MG/DL (ref 74–99)
METER GLUCOSE: 142 MG/DL (ref 74–99)
METER GLUCOSE: 145 MG/DL (ref 74–99)
METER GLUCOSE: 146 MG/DL (ref 74–99)
METER GLUCOSE: 148 MG/DL (ref 74–99)
METER GLUCOSE: 149 MG/DL (ref 74–99)
METER GLUCOSE: 150 MG/DL (ref 74–99)
METER GLUCOSE: 156 MG/DL (ref 74–99)
METER GLUCOSE: 157 MG/DL (ref 74–99)
METER GLUCOSE: 162 MG/DL (ref 74–99)
METER GLUCOSE: 163 MG/DL (ref 74–99)
METER GLUCOSE: 164 MG/DL (ref 74–99)
METER GLUCOSE: 165 MG/DL (ref 74–99)
METER GLUCOSE: 165 MG/DL (ref 74–99)
METER GLUCOSE: 169 MG/DL (ref 74–99)
METER GLUCOSE: 172 MG/DL (ref 74–99)
METER GLUCOSE: 175 MG/DL (ref 74–99)
METER GLUCOSE: 176 MG/DL (ref 74–99)
METER GLUCOSE: 177 MG/DL (ref 74–99)
METER GLUCOSE: 180 MG/DL (ref 74–99)
METER GLUCOSE: 180 MG/DL (ref 74–99)
METER GLUCOSE: 189 MG/DL (ref 74–99)
METER GLUCOSE: 194 MG/DL (ref 74–99)
METER GLUCOSE: 199 MG/DL (ref 74–99)
METER GLUCOSE: 223 MG/DL (ref 74–99)
METER GLUCOSE: 223 MG/DL (ref 74–99)
METER GLUCOSE: 241 MG/DL (ref 74–99)
METHADONE SCREEN, URINE: NOT DETECTED
MONOCYTES ABSOLUTE: 0.76 E9/L (ref 0.1–0.95)
MONOCYTES ABSOLUTE: 0.78 E9/L (ref 0.1–0.95)
MONOCYTES ABSOLUTE: 0.84 E9/L (ref 0.1–0.95)
MONOCYTES ABSOLUTE: 0.9 E9/L (ref 0.1–0.95)
MONOCYTES ABSOLUTE: 0.94 E9/L (ref 0.1–0.95)
MONOCYTES ABSOLUTE: 0.99 E9/L (ref 0.1–0.95)
MONOCYTES ABSOLUTE: 0.99 E9/L (ref 0.1–0.95)
MONOCYTES ABSOLUTE: 1.04 E9/L (ref 0.1–0.95)
MONOCYTES RELATIVE PERCENT: 6.8 % (ref 2–12)
MONOCYTES RELATIVE PERCENT: 7.1 % (ref 2–12)
MONOCYTES RELATIVE PERCENT: 7.7 % (ref 2–12)
MONOCYTES RELATIVE PERCENT: 7.9 % (ref 2–12)
MONOCYTES RELATIVE PERCENT: 8.7 % (ref 2–12)
MONOCYTES RELATIVE PERCENT: 9.1 % (ref 2–12)
MONOCYTES RELATIVE PERCENT: 9.1 % (ref 2–12)
MONOCYTES RELATIVE PERCENT: 9.5 % (ref 2–12)
MYCOPLASMA PNEUMONIAE BY PCR: NOT DETECTED
NEUTROPHILS ABSOLUTE: 10.31 E9/L (ref 1.8–7.3)
NEUTROPHILS ABSOLUTE: 7.03 E9/L (ref 1.8–7.3)
NEUTROPHILS ABSOLUTE: 7.18 E9/L (ref 1.8–7.3)
NEUTROPHILS ABSOLUTE: 7.32 E9/L (ref 1.8–7.3)
NEUTROPHILS ABSOLUTE: 7.64 E9/L (ref 1.8–7.3)
NEUTROPHILS ABSOLUTE: 7.97 E9/L (ref 1.8–7.3)
NEUTROPHILS ABSOLUTE: 7.97 E9/L (ref 1.8–7.3)
NEUTROPHILS ABSOLUTE: 8.08 E9/L (ref 1.8–7.3)
NEUTROPHILS RELATIVE PERCENT: 68.6 % (ref 43–80)
NEUTROPHILS RELATIVE PERCENT: 69 % (ref 43–80)
NEUTROPHILS RELATIVE PERCENT: 69.8 % (ref 43–80)
NEUTROPHILS RELATIVE PERCENT: 70.3 % (ref 43–80)
NEUTROPHILS RELATIVE PERCENT: 70.5 % (ref 43–80)
NEUTROPHILS RELATIVE PERCENT: 71.4 % (ref 43–80)
NEUTROPHILS RELATIVE PERCENT: 74.9 % (ref 43–80)
NEUTROPHILS RELATIVE PERCENT: 77.7 % (ref 43–80)
NITRITE, URINE: NEGATIVE
OPIATE SCREEN URINE: NOT DETECTED
OSMOLALITY URINE: 669 MOSM/KG (ref 300–900)
OXYCODONE URINE: NOT DETECTED
PARAINFLUENZA VIRUS 1 BY PCR: NOT DETECTED
PARAINFLUENZA VIRUS 2 BY PCR: NOT DETECTED
PARAINFLUENZA VIRUS 3 BY PCR: NOT DETECTED
PARAINFLUENZA VIRUS 4 BY PCR: NOT DETECTED
PDW BLD-RTO: 14.5 FL (ref 11.5–15)
PDW BLD-RTO: 14.8 FL (ref 11.5–15)
PDW BLD-RTO: 14.9 FL (ref 11.5–15)
PDW BLD-RTO: 15 FL (ref 11.5–15)
PDW BLD-RTO: 15 FL (ref 11.5–15)
PDW BLD-RTO: 15.1 FL (ref 11.5–15)
PDW BLD-RTO: 15.2 FL (ref 11.5–15)
PDW BLD-RTO: 15.2 FL (ref 11.5–15)
PDW BLD-RTO: 15.3 FL (ref 11.5–15)
PH UA: 5 (ref 5–9)
PHENCYCLIDINE SCREEN URINE: NOT DETECTED
PHOSPHORUS: 4.4 MG/DL (ref 2.5–4.5)
PLATELET # BLD: 198 E9/L (ref 130–450)
PLATELET # BLD: 227 E9/L (ref 130–450)
PLATELET # BLD: 229 E9/L (ref 130–450)
PLATELET # BLD: 230 E9/L (ref 130–450)
PLATELET # BLD: 250 E9/L (ref 130–450)
PLATELET # BLD: 256 E9/L (ref 130–450)
PLATELET # BLD: 261 E9/L (ref 130–450)
PLATELET # BLD: 273 E9/L (ref 130–450)
PLATELET # BLD: 274 E9/L (ref 130–450)
PMV BLD AUTO: 10.8 FL (ref 7–12)
PMV BLD AUTO: 11.3 FL (ref 7–12)
PMV BLD AUTO: 11.3 FL (ref 7–12)
PMV BLD AUTO: 11.4 FL (ref 7–12)
PMV BLD AUTO: 11.5 FL (ref 7–12)
PMV BLD AUTO: 11.6 FL (ref 7–12)
PMV BLD AUTO: 11.8 FL (ref 7–12)
POTASSIUM REFLEX MAGNESIUM: 3.8 MMOL/L (ref 3.5–5)
POTASSIUM REFLEX MAGNESIUM: 3.9 MMOL/L (ref 3.5–5)
POTASSIUM REFLEX MAGNESIUM: 3.9 MMOL/L (ref 3.5–5)
POTASSIUM REFLEX MAGNESIUM: 4 MMOL/L (ref 3.5–5)
POTASSIUM REFLEX MAGNESIUM: 4.1 MMOL/L (ref 3.5–5)
POTASSIUM REFLEX MAGNESIUM: 4.1 MMOL/L (ref 3.5–5)
POTASSIUM REFLEX MAGNESIUM: 4.2 MMOL/L (ref 3.5–5)
POTASSIUM SERPL-SCNC: 4.9 MMOL/L (ref 3.5–5)
POTASSIUM SERPL-SCNC: 5 MMOL/L (ref 3.5–5)
POTASSIUM SERPL-SCNC: 5.2 MMOL/L (ref 3.5–5)
POTASSIUM, UR: 44.2 MMOL/L
POTASSIUM, UR: 57.8 MMOL/L
PRO-BNP: 4152 PG/ML (ref 0–125)
PROCALCITONIN: 0.09 NG/ML (ref 0–0.08)
PROCALCITONIN: 0.1 NG/ML (ref 0–0.08)
PROTEIN UA: NORMAL MG/DL
RBC # BLD: 4.8 E12/L (ref 3.8–5.8)
RBC # BLD: 4.97 E12/L (ref 3.8–5.8)
RBC # BLD: 5.13 E12/L (ref 3.8–5.8)
RBC # BLD: 5.16 E12/L (ref 3.8–5.8)
RBC # BLD: 5.25 E12/L (ref 3.8–5.8)
RBC # BLD: 5.27 E12/L (ref 3.8–5.8)
RBC # BLD: 5.29 E12/L (ref 3.8–5.8)
RBC # BLD: 5.32 E12/L (ref 3.8–5.8)
RBC # BLD: 5.35 E12/L (ref 3.8–5.8)
RBC UA: NORMAL /HPF (ref 0–2)
RESPIRATORY SYNCYTIAL VIRUS BY PCR: NOT DETECTED
SARS-COV-2, NAAT: NOT DETECTED
SARS-COV-2, PCR: NOT DETECTED
SODIUM BLD-SCNC: 138 MMOL/L (ref 132–146)
SODIUM BLD-SCNC: 140 MMOL/L (ref 132–146)
SODIUM BLD-SCNC: 140 MMOL/L (ref 132–146)
SODIUM BLD-SCNC: 141 MMOL/L (ref 132–146)
SODIUM BLD-SCNC: 142 MMOL/L (ref 132–146)
SODIUM BLD-SCNC: 143 MMOL/L (ref 132–146)
SODIUM BLD-SCNC: 143 MMOL/L (ref 132–146)
SODIUM BLD-SCNC: 144 MMOL/L (ref 132–146)
SODIUM URINE: 102 MMOL/L
SODIUM URINE: 114 MMOL/L
SODIUM URINE: 116 MMOL/L
SPECIFIC GRAVITY UA: 1.02 (ref 1–1.03)
TOTAL PROTEIN: 6.3 G/DL (ref 6.4–8.3)
TOTAL PROTEIN: 7 G/DL (ref 6.4–8.3)
TROPONIN, HIGH SENSITIVITY: 24 NG/L (ref 0–11)
TROPONIN, HIGH SENSITIVITY: 25 NG/L (ref 0–11)
UREA NITROGEN, UR: 693 MG/DL (ref 800–1666)
UREA NITROGEN, UR: 787 MG/DL (ref 800–1666)
UROBILINOGEN, URINE: 0.2 E.U./DL
WBC # BLD: 10.2 E9/L (ref 4.5–11.5)
WBC # BLD: 10.4 E9/L (ref 4.5–11.5)
WBC # BLD: 10.5 E9/L (ref 4.5–11.5)
WBC # BLD: 10.6 E9/L (ref 4.5–11.5)
WBC # BLD: 10.7 E9/L (ref 4.5–11.5)
WBC # BLD: 11.4 E9/L (ref 4.5–11.5)
WBC # BLD: 11.4 E9/L (ref 4.5–11.5)
WBC # BLD: 11.5 E9/L (ref 4.5–11.5)
WBC # BLD: 13.3 E9/L (ref 4.5–11.5)
WBC UA: NORMAL /HPF (ref 0–5)

## 2021-01-01 PROCEDURE — 87635 SARS-COV-2 COVID-19 AMP PRB: CPT

## 2021-01-01 PROCEDURE — 97530 THERAPEUTIC ACTIVITIES: CPT

## 2021-01-01 PROCEDURE — 6360000002 HC RX W HCPCS: Performed by: STUDENT IN AN ORGANIZED HEALTH CARE EDUCATION/TRAINING PROGRAM

## 2021-01-01 PROCEDURE — 99232 SBSQ HOSP IP/OBS MODERATE 35: CPT | Performed by: FAMILY MEDICINE

## 2021-01-01 PROCEDURE — 99222 1ST HOSP IP/OBS MODERATE 55: CPT | Performed by: FAMILY MEDICINE

## 2021-01-01 PROCEDURE — 99212 OFFICE O/P EST SF 10 MIN: CPT | Performed by: STUDENT IN AN ORGANIZED HEALTH CARE EDUCATION/TRAINING PROGRAM

## 2021-01-01 PROCEDURE — A9500 TC99M SESTAMIBI: HCPCS | Performed by: RADIOLOGY

## 2021-01-01 PROCEDURE — 6370000000 HC RX 637 (ALT 250 FOR IP): Performed by: STUDENT IN AN ORGANIZED HEALTH CARE EDUCATION/TRAINING PROGRAM

## 2021-01-01 PROCEDURE — 84300 ASSAY OF URINE SODIUM: CPT

## 2021-01-01 PROCEDURE — 91300 COVID-19, PFIZER VACCINE 30MCG/0.3ML DOSE: CPT | Performed by: PHYSICIAN ASSISTANT

## 2021-01-01 PROCEDURE — 99999 PR OFFICE/OUTPT VISIT,PROCEDURE ONLY: CPT | Performed by: INTERNAL MEDICINE

## 2021-01-01 PROCEDURE — 80048 BASIC METABOLIC PNL TOTAL CA: CPT

## 2021-01-01 PROCEDURE — 2700000000 HC OXYGEN THERAPY PER DAY

## 2021-01-01 PROCEDURE — 82962 GLUCOSE BLOOD TEST: CPT

## 2021-01-01 PROCEDURE — 6360000004 HC RX CONTRAST MEDICATION: Performed by: RADIOLOGY

## 2021-01-01 PROCEDURE — 85025 COMPLETE CBC W/AUTO DIFF WBC: CPT

## 2021-01-01 PROCEDURE — 6360000002 HC RX W HCPCS: Performed by: EMERGENCY MEDICINE

## 2021-01-01 PROCEDURE — 94660 CPAP INITIATION&MGMT: CPT

## 2021-01-01 PROCEDURE — 94640 AIRWAY INHALATION TREATMENT: CPT

## 2021-01-01 PROCEDURE — 71046 X-RAY EXAM CHEST 2 VIEWS: CPT

## 2021-01-01 PROCEDURE — 84133 ASSAY OF URINE POTASSIUM: CPT

## 2021-01-01 PROCEDURE — 99239 HOSP IP/OBS DSCHRG MGMT >30: CPT | Performed by: FAMILY MEDICINE

## 2021-01-01 PROCEDURE — 2060000000 HC ICU INTERMEDIATE R&B

## 2021-01-01 PROCEDURE — 83036 HEMOGLOBIN GLYCOSYLATED A1C: CPT | Performed by: STUDENT IN AN ORGANIZED HEALTH CARE EDUCATION/TRAINING PROGRAM

## 2021-01-01 PROCEDURE — 97168 OT RE-EVAL EST PLAN CARE: CPT

## 2021-01-01 PROCEDURE — APPSS60 APP SPLIT SHARED TIME 46-60 MINUTES: Performed by: PHYSICIAN ASSISTANT

## 2021-01-01 PROCEDURE — 82436 ASSAY OF URINE CHLORIDE: CPT

## 2021-01-01 PROCEDURE — 2580000003 HC RX 258: Performed by: STUDENT IN AN ORGANIZED HEALTH CARE EDUCATION/TRAINING PROGRAM

## 2021-01-01 PROCEDURE — 99284 EMERGENCY DEPT VISIT MOD MDM: CPT

## 2021-01-01 PROCEDURE — 87522 HEPATITIS C REVRS TRNSCRPJ: CPT

## 2021-01-01 PROCEDURE — 36415 COLL VENOUS BLD VENIPUNCTURE: CPT

## 2021-01-01 PROCEDURE — 93000 ELECTROCARDIOGRAM COMPLETE: CPT | Performed by: INTERNAL MEDICINE

## 2021-01-01 PROCEDURE — 6370000000 HC RX 637 (ALT 250 FOR IP): Performed by: NURSE PRACTITIONER

## 2021-01-01 PROCEDURE — 6360000002 HC RX W HCPCS: Performed by: INTERNAL MEDICINE

## 2021-01-01 PROCEDURE — 93005 ELECTROCARDIOGRAM TRACING: CPT | Performed by: PHYSICIAN ASSISTANT

## 2021-01-01 PROCEDURE — 80053 COMPREHEN METABOLIC PANEL: CPT

## 2021-01-01 PROCEDURE — 93306 TTE W/DOPPLER COMPLETE: CPT

## 2021-01-01 PROCEDURE — 6360000002 HC RX W HCPCS

## 2021-01-01 PROCEDURE — G0378 HOSPITAL OBSERVATION PER HR: HCPCS

## 2021-01-01 PROCEDURE — 84540 ASSAY OF URINE/UREA-N: CPT

## 2021-01-01 PROCEDURE — 84145 PROCALCITONIN (PCT): CPT

## 2021-01-01 PROCEDURE — 83605 ASSAY OF LACTIC ACID: CPT

## 2021-01-01 PROCEDURE — 93010 ELECTROCARDIOGRAM REPORT: CPT | Performed by: INTERNAL MEDICINE

## 2021-01-01 PROCEDURE — 97166 OT EVAL MOD COMPLEX 45 MIN: CPT

## 2021-01-01 PROCEDURE — 99214 OFFICE O/P EST MOD 30 MIN: CPT | Performed by: STUDENT IN AN ORGANIZED HEALTH CARE EDUCATION/TRAINING PROGRAM

## 2021-01-01 PROCEDURE — 0JQQ0ZZ REPAIR RIGHT FOOT SUBCUTANEOUS TISSUE AND FASCIA, OPEN APPROACH: ICD-10-PCS | Performed by: PODIATRIST

## 2021-01-01 PROCEDURE — 96375 TX/PRO/DX INJ NEW DRUG ADDON: CPT

## 2021-01-01 PROCEDURE — 71271 CT THORAX LUNG CANCER SCR C-: CPT

## 2021-01-01 PROCEDURE — 0202U NFCT DS 22 TRGT SARS-COV-2: CPT

## 2021-01-01 PROCEDURE — 82570 ASSAY OF URINE CREATININE: CPT

## 2021-01-01 PROCEDURE — 99214 OFFICE O/P EST MOD 30 MIN: CPT | Performed by: NURSE PRACTITIONER

## 2021-01-01 PROCEDURE — 91300 COVID-19, PFIZER VACCINE 30MCG/0.3ML DOSE: CPT | Performed by: NURSE PRACTITIONER

## 2021-01-01 PROCEDURE — 83880 ASSAY OF NATRIURETIC PEPTIDE: CPT

## 2021-01-01 PROCEDURE — 71275 CT ANGIOGRAPHY CHEST: CPT

## 2021-01-01 PROCEDURE — 2580000003 HC RX 258: Performed by: EMERGENCY MEDICINE

## 2021-01-01 PROCEDURE — 99223 1ST HOSP IP/OBS HIGH 75: CPT | Performed by: INTERNAL MEDICINE

## 2021-01-01 PROCEDURE — 83935 ASSAY OF URINE OSMOLALITY: CPT

## 2021-01-01 PROCEDURE — 99213 OFFICE O/P EST LOW 20 MIN: CPT | Performed by: STUDENT IN AN ORGANIZED HEALTH CARE EDUCATION/TRAINING PROGRAM

## 2021-01-01 PROCEDURE — 0001A COVID-19, PFIZER VACCINE 30MCG/0.3ML DOSE: CPT | Performed by: PHYSICIAN ASSISTANT

## 2021-01-01 PROCEDURE — 96372 THER/PROPH/DIAG INJ SC/IM: CPT

## 2021-01-01 PROCEDURE — 81001 URINALYSIS AUTO W/SCOPE: CPT

## 2021-01-01 PROCEDURE — 96361 HYDRATE IV INFUSION ADD-ON: CPT

## 2021-01-01 PROCEDURE — 96365 THER/PROPH/DIAG IV INF INIT: CPT

## 2021-01-01 PROCEDURE — 99213 OFFICE O/P EST LOW 20 MIN: CPT | Performed by: INTERNAL MEDICINE

## 2021-01-01 PROCEDURE — 84100 ASSAY OF PHOSPHORUS: CPT

## 2021-01-01 PROCEDURE — G0438 PPPS, INITIAL VISIT: HCPCS | Performed by: STUDENT IN AN ORGANIZED HEALTH CARE EDUCATION/TRAINING PROGRAM

## 2021-01-01 PROCEDURE — 84484 ASSAY OF TROPONIN QUANT: CPT

## 2021-01-01 PROCEDURE — 6360000002 HC RX W HCPCS: Performed by: NURSE PRACTITIONER

## 2021-01-01 PROCEDURE — 85027 COMPLETE CBC AUTOMATED: CPT

## 2021-01-01 PROCEDURE — 83036 HEMOGLOBIN GLYCOSYLATED A1C: CPT

## 2021-01-01 PROCEDURE — 73630 X-RAY EXAM OF FOOT: CPT

## 2021-01-01 PROCEDURE — 93005 ELECTROCARDIOGRAM TRACING: CPT | Performed by: STUDENT IN AN ORGANIZED HEALTH CARE EDUCATION/TRAINING PROGRAM

## 2021-01-01 PROCEDURE — 3430000000 HC RX DIAGNOSTIC RADIOPHARMACEUTICAL: Performed by: RADIOLOGY

## 2021-01-01 PROCEDURE — 80307 DRUG TEST PRSMV CHEM ANLYZR: CPT

## 2021-01-01 PROCEDURE — 36415 COLL VENOUS BLD VENIPUNCTURE: CPT | Performed by: FAMILY MEDICINE

## 2021-01-01 PROCEDURE — 0002A COVID-19, PFIZER VACCINE 30MCG/0.3ML DOSE: CPT | Performed by: NURSE PRACTITIONER

## 2021-01-01 PROCEDURE — 1111F DSCHRG MED/CURRENT MED MERGE: CPT | Performed by: STUDENT IN AN ORGANIZED HEALTH CARE EDUCATION/TRAINING PROGRAM

## 2021-01-01 PROCEDURE — 97161 PT EVAL LOW COMPLEX 20 MIN: CPT

## 2021-01-01 PROCEDURE — 96366 THER/PROPH/DIAG IV INF ADDON: CPT

## 2021-01-01 PROCEDURE — 70450 CT HEAD/BRAIN W/O DYE: CPT

## 2021-01-01 RX ORDER — PEN NEEDLE, DIABETIC 29 G X1/2"
1 NEEDLE, DISPOSABLE MISCELLANEOUS 3 TIMES DAILY
Qty: 100 EACH | Refills: 3 | Status: SHIPPED
Start: 2021-01-01 | End: 2021-01-01 | Stop reason: SDUPTHER

## 2021-01-01 RX ORDER — GABAPENTIN 300 MG/1
300 CAPSULE ORAL 3 TIMES DAILY
Qty: 270 CAPSULE | Refills: 1 | Status: SHIPPED
Start: 2021-01-01 | End: 2021-01-01 | Stop reason: ALTCHOICE

## 2021-01-01 RX ORDER — FUROSEMIDE 10 MG/ML
40 INJECTION INTRAMUSCULAR; INTRAVENOUS DAILY
Status: DISCONTINUED | OUTPATIENT
Start: 2021-01-01 | End: 2021-01-01

## 2021-01-01 RX ORDER — INSULIN ASPART 100 [IU]/ML
20 INJECTION, SOLUTION INTRAVENOUS; SUBCUTANEOUS DAILY
Qty: 5 CARTRIDGE | Refills: 3 | Status: SHIPPED
Start: 2021-01-01 | End: 2021-01-01 | Stop reason: SDUPTHER

## 2021-01-01 RX ORDER — LANCETS 30 GAUGE
EACH MISCELLANEOUS
Qty: 100 EACH | Refills: 5 | Status: SHIPPED
Start: 2021-01-01 | End: 2021-01-01

## 2021-01-01 RX ORDER — FLUTICASONE PROPIONATE 50 MCG
1 SPRAY, SUSPENSION (ML) NASAL 2 TIMES DAILY
Status: DISCONTINUED | OUTPATIENT
Start: 2021-01-01 | End: 2021-01-01 | Stop reason: HOSPADM

## 2021-01-01 RX ORDER — SODIUM CHLORIDE 9 MG/ML
25 INJECTION, SOLUTION INTRAVENOUS PRN
Status: DISCONTINUED | OUTPATIENT
Start: 2021-01-01 | End: 2021-01-01 | Stop reason: HOSPADM

## 2021-01-01 RX ORDER — ACETAMINOPHEN 650 MG/1
650 SUPPOSITORY RECTAL EVERY 6 HOURS PRN
Status: DISCONTINUED | OUTPATIENT
Start: 2021-01-01 | End: 2021-01-01 | Stop reason: HOSPADM

## 2021-01-01 RX ORDER — MIRTAZAPINE 15 MG/1
30 TABLET, FILM COATED ORAL NIGHTLY
Status: DISCONTINUED | OUTPATIENT
Start: 2021-01-01 | End: 2021-01-01 | Stop reason: HOSPADM

## 2021-01-01 RX ORDER — PREGABALIN 100 MG/1
100 CAPSULE ORAL 3 TIMES DAILY
Qty: 90 CAPSULE | Refills: 0 | Status: CANCELLED | OUTPATIENT
Start: 2021-01-01 | End: 2021-01-01

## 2021-01-01 RX ORDER — METOPROLOL SUCCINATE 50 MG/1
50 TABLET, EXTENDED RELEASE ORAL DAILY
Qty: 30 TABLET | Refills: 3 | Status: SHIPPED | OUTPATIENT
Start: 2021-01-01

## 2021-01-01 RX ORDER — LOSARTAN POTASSIUM 25 MG/1
TABLET ORAL
Qty: 90 TABLET | Refills: 0 | Status: ON HOLD
Start: 2021-01-01 | End: 2021-01-01 | Stop reason: HOSPADM

## 2021-01-01 RX ORDER — GLUCOSAMINE HCL/CHONDROITIN SU 500-400 MG
CAPSULE ORAL
Qty: 300 STRIP | Refills: 1 | OUTPATIENT
Start: 2021-01-01

## 2021-01-01 RX ORDER — DEXTROSE MONOHYDRATE 50 MG/ML
100 INJECTION, SOLUTION INTRAVENOUS PRN
Status: DISCONTINUED | OUTPATIENT
Start: 2021-01-01 | End: 2021-01-01 | Stop reason: HOSPADM

## 2021-01-01 RX ORDER — TRAZODONE HYDROCHLORIDE 100 MG/1
300 TABLET ORAL NIGHTLY
Qty: 45 TABLET | Refills: 0 | Status: SHIPPED
Start: 2021-01-01 | End: 2021-01-01 | Stop reason: ALTCHOICE

## 2021-01-01 RX ORDER — INSULIN GLARGINE 100 [IU]/ML
30 INJECTION, SOLUTION SUBCUTANEOUS 2 TIMES DAILY
Status: DISCONTINUED | OUTPATIENT
Start: 2021-01-01 | End: 2021-01-01

## 2021-01-01 RX ORDER — ATENOLOL 50 MG/1
50 TABLET ORAL DAILY
Status: DISCONTINUED | OUTPATIENT
Start: 2021-01-01 | End: 2021-01-01

## 2021-01-01 RX ORDER — INSULIN ASPART 100 [IU]/ML
20 INJECTION, SOLUTION INTRAVENOUS; SUBCUTANEOUS DAILY
Qty: 5 CARTRIDGE | Refills: 3 | Status: SHIPPED | OUTPATIENT
Start: 2021-01-01

## 2021-01-01 RX ORDER — HYDROCHLOROTHIAZIDE 25 MG/1
TABLET ORAL
Qty: 90 TABLET | Refills: 1 | Status: SHIPPED
Start: 2021-01-01 | End: 2021-01-01 | Stop reason: SDUPTHER

## 2021-01-01 RX ORDER — ATORVASTATIN CALCIUM 80 MG/1
TABLET, FILM COATED ORAL
Qty: 90 TABLET | Refills: 1 | Status: SHIPPED | OUTPATIENT
Start: 2021-01-01

## 2021-01-01 RX ORDER — SODIUM CHLORIDE 0.9 % (FLUSH) 0.9 %
5-40 SYRINGE (ML) INJECTION PRN
Status: DISCONTINUED | OUTPATIENT
Start: 2021-01-01 | End: 2021-01-01 | Stop reason: HOSPADM

## 2021-01-01 RX ORDER — INSULIN GLARGINE 100 [IU]/ML
30 INJECTION, SOLUTION SUBCUTANEOUS 2 TIMES DAILY
Status: DISCONTINUED | OUTPATIENT
Start: 2021-01-01 | End: 2021-01-01 | Stop reason: HOSPADM

## 2021-01-01 RX ORDER — GLUCOSAMINE HCL/CHONDROITIN SU 500-400 MG
CAPSULE ORAL
Qty: 300 STRIP | Refills: 1 | Status: SHIPPED
Start: 2021-01-01 | End: 2021-01-01 | Stop reason: SDUPTHER

## 2021-01-01 RX ORDER — OMEPRAZOLE 20 MG/1
20 CAPSULE, DELAYED RELEASE ORAL DAILY
Qty: 90 CAPSULE | Refills: 1 | Status: SHIPPED | OUTPATIENT
Start: 2021-01-01

## 2021-01-01 RX ORDER — AMLODIPINE BESYLATE 10 MG/1
10 TABLET ORAL DAILY
Status: DISCONTINUED | OUTPATIENT
Start: 2021-01-01 | End: 2021-01-01 | Stop reason: HOSPADM

## 2021-01-01 RX ORDER — GABAPENTIN 800 MG/1
800 TABLET ORAL NIGHTLY
Qty: 30 TABLET | Refills: 1 | Status: SHIPPED
Start: 2021-01-01 | End: 2021-01-01 | Stop reason: SDUPTHER

## 2021-01-01 RX ORDER — ATENOLOL 50 MG/1
TABLET ORAL
Qty: 180 TABLET | Refills: 1 | Status: SHIPPED
Start: 2021-01-01 | End: 2021-01-01 | Stop reason: SDUPTHER

## 2021-01-01 RX ORDER — TRAZODONE HYDROCHLORIDE 100 MG/1
200 TABLET ORAL NIGHTLY
Qty: 120 TABLET | Refills: 3 | Status: SHIPPED
Start: 2021-01-01 | End: 2021-01-01 | Stop reason: ALTCHOICE

## 2021-01-01 RX ORDER — ONDANSETRON 2 MG/ML
4 INJECTION INTRAMUSCULAR; INTRAVENOUS EVERY 6 HOURS PRN
Status: DISCONTINUED | OUTPATIENT
Start: 2021-01-01 | End: 2021-01-01 | Stop reason: HOSPADM

## 2021-01-01 RX ORDER — 0.9 % SODIUM CHLORIDE 0.9 %
1000 INTRAVENOUS SOLUTION INTRAVENOUS ONCE
Status: COMPLETED | OUTPATIENT
Start: 2021-01-01 | End: 2021-01-01

## 2021-01-01 RX ORDER — INSULIN GLARGINE 100 [IU]/ML
0.25 INJECTION, SOLUTION SUBCUTANEOUS NIGHTLY
Status: DISCONTINUED | OUTPATIENT
Start: 2021-01-01 | End: 2021-01-01

## 2021-01-01 RX ORDER — ONDANSETRON 4 MG/1
4 TABLET, ORALLY DISINTEGRATING ORAL EVERY 8 HOURS PRN
Status: DISCONTINUED | OUTPATIENT
Start: 2021-01-01 | End: 2021-01-01 | Stop reason: HOSPADM

## 2021-01-01 RX ORDER — DEXTROSE MONOHYDRATE 25 G/50ML
12.5 INJECTION, SOLUTION INTRAVENOUS PRN
Status: DISCONTINUED | OUTPATIENT
Start: 2021-01-01 | End: 2021-01-01 | Stop reason: HOSPADM

## 2021-01-01 RX ORDER — DIPHENHYDRAMINE HCL 50 MG
100 CAPSULE ORAL NIGHTLY
Qty: 60 CAPSULE | Refills: 0 | Status: CANCELLED | OUTPATIENT
Start: 2021-01-01

## 2021-01-01 RX ORDER — TRAZODONE HYDROCHLORIDE 100 MG/1
200 TABLET ORAL NIGHTLY
Qty: 60 TABLET | Refills: 0 | Status: SHIPPED | OUTPATIENT
Start: 2021-01-01

## 2021-01-01 RX ORDER — REGADENOSON 0.08 MG/ML
0.4 INJECTION, SOLUTION INTRAVENOUS
Status: DISCONTINUED | OUTPATIENT
Start: 2021-01-01 | End: 2021-01-01 | Stop reason: HOSPADM

## 2021-01-01 RX ORDER — TRAZODONE HYDROCHLORIDE 100 MG/1
400 TABLET ORAL NIGHTLY
Qty: 120 TABLET | Refills: 0 | Status: ON HOLD
Start: 2021-01-01 | End: 2021-01-01 | Stop reason: HOSPADM

## 2021-01-01 RX ORDER — SODIUM CHLORIDE 9 MG/ML
1000 INJECTION, SOLUTION INTRAVENOUS CONTINUOUS
Status: DISCONTINUED | OUTPATIENT
Start: 2021-01-01 | End: 2021-01-01

## 2021-01-01 RX ORDER — AMLODIPINE BESYLATE 10 MG/1
TABLET ORAL
Qty: 90 TABLET | Refills: 1 | Status: SHIPPED
Start: 2021-01-01 | End: 2021-01-01 | Stop reason: SDUPTHER

## 2021-01-01 RX ORDER — FINASTERIDE 5 MG/1
5 TABLET, FILM COATED ORAL DAILY
Qty: 90 TABLET | Refills: 3 | Status: SHIPPED | OUTPATIENT
Start: 2021-01-01

## 2021-01-01 RX ORDER — IPRATROPIUM BROMIDE AND ALBUTEROL SULFATE 2.5; .5 MG/3ML; MG/3ML
1 SOLUTION RESPIRATORY (INHALATION)
Status: DISCONTINUED | OUTPATIENT
Start: 2021-01-01 | End: 2021-01-01 | Stop reason: HOSPADM

## 2021-01-01 RX ORDER — ATENOLOL 50 MG/1
TABLET ORAL
Qty: 180 TABLET | Refills: 1 | Status: ON HOLD
Start: 2021-01-01 | End: 2021-01-01 | Stop reason: HOSPADM

## 2021-01-01 RX ORDER — HYDROCHLOROTHIAZIDE 25 MG/1
TABLET ORAL
Qty: 90 TABLET | Refills: 1 | Status: ON HOLD
Start: 2021-01-01 | End: 2021-01-01 | Stop reason: HOSPADM

## 2021-01-01 RX ORDER — DOCUSATE SODIUM 100 MG/1
100 CAPSULE, LIQUID FILLED ORAL 2 TIMES DAILY
Qty: 60 CAPSULE | Refills: 5 | Status: SHIPPED | OUTPATIENT
Start: 2021-01-01

## 2021-01-01 RX ORDER — NICOTINE POLACRILEX 4 MG
15 LOZENGE BUCCAL PRN
Status: DISCONTINUED | OUTPATIENT
Start: 2021-01-01 | End: 2021-01-01 | Stop reason: HOSPADM

## 2021-01-01 RX ORDER — TRAZODONE HYDROCHLORIDE 100 MG/1
400 TABLET ORAL NIGHTLY
Qty: 120 TABLET | Refills: 0 | Status: SHIPPED
Start: 2021-01-01 | End: 2021-01-01

## 2021-01-01 RX ORDER — MIRTAZAPINE 30 MG/1
TABLET, FILM COATED ORAL
Qty: 60 TABLET | Refills: 3 | Status: SHIPPED | OUTPATIENT
Start: 2021-01-01

## 2021-01-01 RX ORDER — FINASTERIDE 5 MG/1
5 TABLET, FILM COATED ORAL DAILY
Qty: 90 TABLET | Refills: 3 | Status: SHIPPED
Start: 2021-01-01 | End: 2021-01-01 | Stop reason: SDUPTHER

## 2021-01-01 RX ORDER — PEN NEEDLE, DIABETIC 29 G X1/2"
1 NEEDLE, DISPOSABLE MISCELLANEOUS 3 TIMES DAILY
Qty: 100 EACH | Refills: 3 | Status: SHIPPED
Start: 2021-01-01 | End: 2021-01-01

## 2021-01-01 RX ORDER — TRAZODONE HYDROCHLORIDE 100 MG/1
400 TABLET ORAL NIGHTLY
Qty: 120 TABLET | Refills: 0 | Status: SHIPPED
Start: 2021-01-01 | End: 2021-01-01 | Stop reason: SDUPTHER

## 2021-01-01 RX ORDER — TRAZODONE HYDROCHLORIDE 100 MG/1
400 TABLET ORAL NIGHTLY
Qty: 120 TABLET | Refills: 0 | Status: CANCELLED | OUTPATIENT
Start: 2021-01-01 | End: 2021-01-01

## 2021-01-01 RX ORDER — FUROSEMIDE 10 MG/ML
20 INJECTION INTRAMUSCULAR; INTRAVENOUS ONCE
Status: COMPLETED | OUTPATIENT
Start: 2021-01-01 | End: 2021-01-01

## 2021-01-01 RX ORDER — HYDRALAZINE HYDROCHLORIDE 10 MG/1
10 TABLET, FILM COATED ORAL 3 TIMES DAILY
Qty: 90 TABLET | Refills: 3 | Status: SHIPPED | OUTPATIENT
Start: 2021-01-01

## 2021-01-01 RX ORDER — ARFORMOTEROL TARTRATE 15 UG/2ML
15 SOLUTION RESPIRATORY (INHALATION) 2 TIMES DAILY
Status: DISCONTINUED | OUTPATIENT
Start: 2021-01-01 | End: 2021-01-01

## 2021-01-01 RX ORDER — ASPIRIN 81 MG/1
81 TABLET ORAL DAILY
Status: DISCONTINUED | OUTPATIENT
Start: 2021-01-01 | End: 2021-01-01 | Stop reason: HOSPADM

## 2021-01-01 RX ORDER — HYDROCHLOROTHIAZIDE 25 MG/1
TABLET ORAL
Qty: 90 TABLET | Refills: 1 | OUTPATIENT
Start: 2021-01-01

## 2021-01-01 RX ORDER — INSULIN GLARGINE 100 [IU]/ML
35 INJECTION, SOLUTION SUBCUTANEOUS NIGHTLY
Status: COMPLETED | OUTPATIENT
Start: 2021-01-01 | End: 2021-01-01

## 2021-01-01 RX ORDER — SODIUM CHLORIDE 0.9 % (FLUSH) 0.9 %
5-40 SYRINGE (ML) INJECTION EVERY 12 HOURS SCHEDULED
Status: DISCONTINUED | OUTPATIENT
Start: 2021-01-01 | End: 2021-01-01 | Stop reason: HOSPADM

## 2021-01-01 RX ORDER — ATORVASTATIN CALCIUM 80 MG/1
TABLET, FILM COATED ORAL
Qty: 90 TABLET | Refills: 1 | Status: SHIPPED
Start: 2021-01-01 | End: 2021-01-01 | Stop reason: SDUPTHER

## 2021-01-01 RX ORDER — ALBUTEROL SULFATE 2.5 MG/3ML
2.5 SOLUTION RESPIRATORY (INHALATION) 4 TIMES DAILY
Qty: 1080 ML | Refills: 1 | Status: SHIPPED
Start: 2021-01-01 | End: 2021-01-01 | Stop reason: SDUPTHER

## 2021-01-01 RX ORDER — BUDESONIDE 0.5 MG/2ML
0.5 INHALANT ORAL 2 TIMES DAILY
Status: DISCONTINUED | OUTPATIENT
Start: 2021-01-01 | End: 2021-01-01 | Stop reason: HOSPADM

## 2021-01-01 RX ORDER — OMEPRAZOLE 20 MG/1
20 CAPSULE, DELAYED RELEASE ORAL DAILY
Qty: 90 CAPSULE | Refills: 1 | Status: SHIPPED
Start: 2021-01-01 | End: 2021-01-01 | Stop reason: SDUPTHER

## 2021-01-01 RX ORDER — TAMSULOSIN HYDROCHLORIDE 0.4 MG/1
CAPSULE ORAL
Qty: 90 CAPSULE | Refills: 1 | Status: SHIPPED
Start: 2021-01-01 | End: 2021-01-01 | Stop reason: SDUPTHER

## 2021-01-01 RX ORDER — GLUCOSAMINE HCL/CHONDROITIN SU 500-400 MG
CAPSULE ORAL
Qty: 300 STRIP | Refills: 1 | Status: SHIPPED
Start: 2021-01-01 | End: 2021-01-01

## 2021-01-01 RX ORDER — ATORVASTATIN CALCIUM 40 MG/1
80 TABLET, FILM COATED ORAL NIGHTLY
Status: DISCONTINUED | OUTPATIENT
Start: 2021-01-01 | End: 2021-01-01 | Stop reason: HOSPADM

## 2021-01-01 RX ORDER — MIRTAZAPINE 30 MG/1
TABLET, FILM COATED ORAL
Qty: 60 TABLET | Refills: 3 | Status: CANCELLED | OUTPATIENT
Start: 2021-01-01

## 2021-01-01 RX ORDER — FUROSEMIDE 10 MG/ML
40 INJECTION INTRAMUSCULAR; INTRAVENOUS 2 TIMES DAILY
Status: DISCONTINUED | OUTPATIENT
Start: 2021-01-01 | End: 2021-01-01

## 2021-01-01 RX ORDER — LEVOFLOXACIN 5 MG/ML
750 INJECTION, SOLUTION INTRAVENOUS EVERY 24 HOURS
Status: DISCONTINUED | OUTPATIENT
Start: 2021-01-01 | End: 2021-01-01

## 2021-01-01 RX ORDER — FUROSEMIDE 40 MG/1
40 TABLET ORAL DAILY
Status: DISCONTINUED | OUTPATIENT
Start: 2021-01-01 | End: 2021-01-01 | Stop reason: HOSPADM

## 2021-01-01 RX ORDER — ACETAMINOPHEN 325 MG/1
650 TABLET ORAL EVERY 6 HOURS PRN
Status: DISCONTINUED | OUTPATIENT
Start: 2021-01-01 | End: 2021-01-01 | Stop reason: HOSPADM

## 2021-01-01 RX ORDER — HYDRALAZINE HYDROCHLORIDE 25 MG/1
25 TABLET, FILM COATED ORAL EVERY 8 HOURS PRN
Status: DISCONTINUED | OUTPATIENT
Start: 2021-01-01 | End: 2021-01-01 | Stop reason: HOSPADM

## 2021-01-01 RX ORDER — PANTOPRAZOLE SODIUM 40 MG/1
40 TABLET, DELAYED RELEASE ORAL
Status: DISCONTINUED | OUTPATIENT
Start: 2021-01-01 | End: 2021-01-01 | Stop reason: HOSPADM

## 2021-01-01 RX ORDER — LEVOFLOXACIN 5 MG/ML
750 INJECTION, SOLUTION INTRAVENOUS ONCE
Status: COMPLETED | OUTPATIENT
Start: 2021-01-01 | End: 2021-01-01

## 2021-01-01 RX ORDER — GABAPENTIN 800 MG/1
800 TABLET ORAL NIGHTLY
Qty: 30 TABLET | Refills: 1 | Status: SHIPPED
Start: 2021-01-01 | End: 2021-01-01

## 2021-01-01 RX ORDER — LOSARTAN POTASSIUM 25 MG/1
25 TABLET ORAL DAILY
Qty: 90 TABLET | Refills: 0 | Status: SHIPPED
Start: 2021-01-01 | End: 2021-01-01

## 2021-01-01 RX ORDER — LANOLIN ALCOHOL/MO/W.PET/CERES
3 CREAM (GRAM) TOPICAL NIGHTLY PRN
Status: DISCONTINUED | OUTPATIENT
Start: 2021-01-01 | End: 2021-01-01 | Stop reason: HOSPADM

## 2021-01-01 RX ORDER — TRAZODONE HYDROCHLORIDE 100 MG/1
200 TABLET ORAL NIGHTLY
Qty: 120 TABLET | Refills: 3 | OUTPATIENT
Start: 2021-01-01

## 2021-01-01 RX ORDER — POLYETHYLENE GLYCOL 3350 17 G/17G
17 POWDER, FOR SOLUTION ORAL DAILY PRN
Status: DISCONTINUED | OUTPATIENT
Start: 2021-01-01 | End: 2021-01-01 | Stop reason: HOSPADM

## 2021-01-01 RX ORDER — GABAPENTIN 800 MG/1
TABLET ORAL
Qty: 30 TABLET | Refills: 1 | Status: SHIPPED | OUTPATIENT
Start: 2021-01-01 | End: 2021-01-01

## 2021-01-01 RX ORDER — BLOOD-GLUCOSE METER
1 EACH MISCELLANEOUS DAILY
Qty: 1 KIT | Refills: 0 | Status: SHIPPED
Start: 2021-01-01 | End: 2021-01-01

## 2021-01-01 RX ORDER — METOPROLOL SUCCINATE 50 MG/1
50 TABLET, EXTENDED RELEASE ORAL DAILY
Status: DISCONTINUED | OUTPATIENT
Start: 2021-01-01 | End: 2021-01-01 | Stop reason: HOSPADM

## 2021-01-01 RX ORDER — AMLODIPINE BESYLATE 10 MG/1
TABLET ORAL
Qty: 90 TABLET | Refills: 1 | Status: SHIPPED
Start: 2021-01-01 | End: 2021-01-01 | Stop reason: ALTCHOICE

## 2021-01-01 RX ORDER — TRAZODONE HYDROCHLORIDE 100 MG/1
400 TABLET ORAL NIGHTLY
Qty: 60 TABLET | Refills: 0 | Status: SHIPPED | OUTPATIENT
Start: 2021-01-01 | End: 2021-01-01 | Stop reason: SDUPTHER

## 2021-01-01 RX ORDER — TAMSULOSIN HYDROCHLORIDE 0.4 MG/1
CAPSULE ORAL
Qty: 90 CAPSULE | Refills: 1 | Status: SHIPPED | OUTPATIENT
Start: 2021-01-01

## 2021-01-01 RX ORDER — FUROSEMIDE 40 MG/1
40 TABLET ORAL DAILY
Qty: 60 TABLET | Refills: 3 | Status: SHIPPED | OUTPATIENT
Start: 2021-01-01

## 2021-01-01 RX ADMIN — IPRATROPIUM BROMIDE AND ALBUTEROL SULFATE 1 AMPULE: .5; 3 SOLUTION RESPIRATORY (INHALATION) at 16:12

## 2021-01-01 RX ADMIN — ARFORMOTEROL TARTRATE 15 MCG: 15 SOLUTION RESPIRATORY (INHALATION) at 08:36

## 2021-01-01 RX ADMIN — AMLODIPINE BESYLATE 10 MG: 10 TABLET ORAL at 08:41

## 2021-01-01 RX ADMIN — Medication 10 ML: at 12:00

## 2021-01-01 RX ADMIN — IPRATROPIUM BROMIDE AND ALBUTEROL SULFATE 1 AMPULE: .5; 3 SOLUTION RESPIRATORY (INHALATION) at 16:15

## 2021-01-01 RX ADMIN — ATENOLOL 50 MG: 50 TABLET ORAL at 11:08

## 2021-01-01 RX ADMIN — IOPAMIDOL 60 ML: 755 INJECTION, SOLUTION INTRAVENOUS at 17:26

## 2021-01-01 RX ADMIN — INSULIN LISPRO 11 UNITS: 100 INJECTION, SOLUTION INTRAVENOUS; SUBCUTANEOUS at 12:21

## 2021-01-01 RX ADMIN — FUROSEMIDE 40 MG: 10 INJECTION INTRAMUSCULAR; INTRAVENOUS at 19:52

## 2021-01-01 RX ADMIN — IPRATROPIUM BROMIDE AND ALBUTEROL SULFATE 1 AMPULE: .5; 3 SOLUTION RESPIRATORY (INHALATION) at 15:54

## 2021-01-01 RX ADMIN — PANTOPRAZOLE SODIUM 40 MG: 40 TABLET, DELAYED RELEASE ORAL at 06:14

## 2021-01-01 RX ADMIN — INSULIN GLARGINE 30 UNITS: 100 INJECTION, SOLUTION SUBCUTANEOUS at 09:27

## 2021-01-01 RX ADMIN — Medication 12 MILLICURIE: at 08:41

## 2021-01-01 RX ADMIN — INSULIN LISPRO 11 UNITS: 100 INJECTION, SOLUTION INTRAVENOUS; SUBCUTANEOUS at 08:47

## 2021-01-01 RX ADMIN — ARFORMOTEROL TARTRATE 15 MCG: 15 SOLUTION RESPIRATORY (INHALATION) at 12:32

## 2021-01-01 RX ADMIN — PANTOPRAZOLE SODIUM 40 MG: 40 TABLET, DELAYED RELEASE ORAL at 06:00

## 2021-01-01 RX ADMIN — INSULIN LISPRO 11 UNITS: 100 INJECTION, SOLUTION INTRAVENOUS; SUBCUTANEOUS at 19:51

## 2021-01-01 RX ADMIN — FUROSEMIDE 40 MG: 10 INJECTION INTRAMUSCULAR; INTRAVENOUS at 09:29

## 2021-01-01 RX ADMIN — INSULIN LISPRO 11 UNITS: 100 INJECTION, SOLUTION INTRAVENOUS; SUBCUTANEOUS at 17:38

## 2021-01-01 RX ADMIN — Medication 10 ML: at 22:20

## 2021-01-01 RX ADMIN — IPRATROPIUM BROMIDE AND ALBUTEROL SULFATE 1 AMPULE: .5; 3 SOLUTION RESPIRATORY (INHALATION) at 12:22

## 2021-01-01 RX ADMIN — ATENOLOL 50 MG: 50 TABLET ORAL at 08:46

## 2021-01-01 RX ADMIN — IPRATROPIUM BROMIDE AND ALBUTEROL SULFATE 1 AMPULE: .5; 3 SOLUTION RESPIRATORY (INHALATION) at 20:50

## 2021-01-01 RX ADMIN — METOPROLOL SUCCINATE 50 MG: 50 TABLET, EXTENDED RELEASE ORAL at 15:28

## 2021-01-01 RX ADMIN — IPRATROPIUM BROMIDE AND ALBUTEROL SULFATE 1 AMPULE: .5; 3 SOLUTION RESPIRATORY (INHALATION) at 12:23

## 2021-01-01 RX ADMIN — ENOXAPARIN SODIUM 40 MG: 40 INJECTION SUBCUTANEOUS at 09:29

## 2021-01-01 RX ADMIN — ATORVASTATIN CALCIUM 80 MG: 40 TABLET, FILM COATED ORAL at 21:36

## 2021-01-01 RX ADMIN — MIRTAZAPINE 30 MG: 15 TABLET, FILM COATED ORAL at 20:36

## 2021-01-01 RX ADMIN — INSULIN GLARGINE 30 UNITS: 100 INJECTION, SOLUTION SUBCUTANEOUS at 21:32

## 2021-01-01 RX ADMIN — TRAZODONE HYDROCHLORIDE 400 MG: 50 TABLET ORAL at 21:15

## 2021-01-01 RX ADMIN — ATENOLOL 50 MG: 50 TABLET ORAL at 08:04

## 2021-01-01 RX ADMIN — IPRATROPIUM BROMIDE AND ALBUTEROL SULFATE 1 AMPULE: .5; 3 SOLUTION RESPIRATORY (INHALATION) at 16:07

## 2021-01-01 RX ADMIN — INSULIN LISPRO 11 UNITS: 100 INJECTION, SOLUTION INTRAVENOUS; SUBCUTANEOUS at 06:06

## 2021-01-01 RX ADMIN — Medication 10 ML: at 08:45

## 2021-01-01 RX ADMIN — IPRATROPIUM BROMIDE AND ALBUTEROL SULFATE 1 AMPULE: .5; 3 SOLUTION RESPIRATORY (INHALATION) at 13:07

## 2021-01-01 RX ADMIN — FUROSEMIDE 40 MG: 10 INJECTION INTRAMUSCULAR; INTRAVENOUS at 08:41

## 2021-01-01 RX ADMIN — ENOXAPARIN SODIUM 40 MG: 40 INJECTION SUBCUTANEOUS at 09:26

## 2021-01-01 RX ADMIN — BUDESONIDE 500 MCG: 0.5 SUSPENSION RESPIRATORY (INHALATION) at 20:12

## 2021-01-01 RX ADMIN — IPRATROPIUM BROMIDE AND ALBUTEROL SULFATE 1 AMPULE: .5; 3 SOLUTION RESPIRATORY (INHALATION) at 08:07

## 2021-01-01 RX ADMIN — ASPIRIN 81 MG: 81 TABLET, COATED ORAL at 08:04

## 2021-01-01 RX ADMIN — AMLODIPINE BESYLATE 10 MG: 10 TABLET ORAL at 09:06

## 2021-01-01 RX ADMIN — ENOXAPARIN SODIUM 40 MG: 40 INJECTION SUBCUTANEOUS at 09:06

## 2021-01-01 RX ADMIN — ENOXAPARIN SODIUM 40 MG: 40 INJECTION SUBCUTANEOUS at 08:54

## 2021-01-01 RX ADMIN — INSULIN LISPRO 11 UNITS: 100 INJECTION, SOLUTION INTRAVENOUS; SUBCUTANEOUS at 11:59

## 2021-01-01 RX ADMIN — IPRATROPIUM BROMIDE AND ALBUTEROL SULFATE 1 AMPULE: .5; 3 SOLUTION RESPIRATORY (INHALATION) at 09:13

## 2021-01-01 RX ADMIN — INSULIN LISPRO 11 UNITS: 100 INJECTION, SOLUTION INTRAVENOUS; SUBCUTANEOUS at 17:37

## 2021-01-01 RX ADMIN — Medication 10 ML: at 05:33

## 2021-01-01 RX ADMIN — ASPIRIN 81 MG: 81 TABLET, COATED ORAL at 08:41

## 2021-01-01 RX ADMIN — ATENOLOL 50 MG: 50 TABLET ORAL at 09:25

## 2021-01-01 RX ADMIN — Medication 10 ML: at 09:32

## 2021-01-01 RX ADMIN — FUROSEMIDE 40 MG: 10 INJECTION INTRAMUSCULAR; INTRAVENOUS at 18:17

## 2021-01-01 RX ADMIN — IPRATROPIUM BROMIDE AND ALBUTEROL SULFATE 1 AMPULE: .5; 3 SOLUTION RESPIRATORY (INHALATION) at 20:12

## 2021-01-01 RX ADMIN — INSULIN GLARGINE 30 UNITS: 100 INJECTION, SOLUTION SUBCUTANEOUS at 09:20

## 2021-01-01 RX ADMIN — TRAZODONE HYDROCHLORIDE 400 MG: 50 TABLET ORAL at 20:50

## 2021-01-01 RX ADMIN — INSULIN GLARGINE 30 UNITS: 100 INJECTION, SOLUTION SUBCUTANEOUS at 20:26

## 2021-01-01 RX ADMIN — SODIUM CHLORIDE 1000 ML: 9 INJECTION, SOLUTION INTRAVENOUS at 17:11

## 2021-01-01 RX ADMIN — BUDESONIDE 500 MCG: 0.5 SUSPENSION RESPIRATORY (INHALATION) at 08:07

## 2021-01-01 RX ADMIN — PANTOPRAZOLE SODIUM 40 MG: 40 TABLET, DELAYED RELEASE ORAL at 06:32

## 2021-01-01 RX ADMIN — INSULIN GLARGINE 35 UNITS: 100 INJECTION, SOLUTION SUBCUTANEOUS at 21:14

## 2021-01-01 RX ADMIN — ASPIRIN 81 MG: 81 TABLET, COATED ORAL at 09:26

## 2021-01-01 RX ADMIN — FUROSEMIDE 40 MG: 10 INJECTION INTRAMUSCULAR; INTRAVENOUS at 08:46

## 2021-01-01 RX ADMIN — TRAZODONE HYDROCHLORIDE 400 MG: 50 TABLET ORAL at 21:21

## 2021-01-01 RX ADMIN — FLUTICASONE PROPIONATE 1 SPRAY: 50 SPRAY, METERED NASAL at 08:05

## 2021-01-01 RX ADMIN — Medication 10 ML: at 09:10

## 2021-01-01 RX ADMIN — INSULIN GLARGINE 30 UNITS: 100 INJECTION, SOLUTION SUBCUTANEOUS at 09:31

## 2021-01-01 RX ADMIN — MIRTAZAPINE 30 MG: 15 TABLET, FILM COATED ORAL at 21:36

## 2021-01-01 RX ADMIN — Medication 10 ML: at 08:11

## 2021-01-01 RX ADMIN — IPRATROPIUM BROMIDE AND ALBUTEROL SULFATE 1 AMPULE: .5; 3 SOLUTION RESPIRATORY (INHALATION) at 08:35

## 2021-01-01 RX ADMIN — INSULIN LISPRO 11 UNITS: 100 INJECTION, SOLUTION INTRAVENOUS; SUBCUTANEOUS at 06:18

## 2021-01-01 RX ADMIN — IPRATROPIUM BROMIDE AND ALBUTEROL SULFATE 1 AMPULE: .5; 3 SOLUTION RESPIRATORY (INHALATION) at 16:22

## 2021-01-01 RX ADMIN — INSULIN GLARGINE 30 UNITS: 100 INJECTION, SOLUTION SUBCUTANEOUS at 11:10

## 2021-01-01 RX ADMIN — INSULIN LISPRO 11 UNITS: 100 INJECTION, SOLUTION INTRAVENOUS; SUBCUTANEOUS at 12:11

## 2021-01-01 RX ADMIN — FLUTICASONE PROPIONATE 1 SPRAY: 50 SPRAY, METERED NASAL at 21:36

## 2021-01-01 RX ADMIN — FLUTICASONE PROPIONATE 1 SPRAY: 50 SPRAY, METERED NASAL at 08:42

## 2021-01-01 RX ADMIN — AMLODIPINE BESYLATE 10 MG: 10 TABLET ORAL at 08:04

## 2021-01-01 RX ADMIN — Medication 10 ML: at 21:34

## 2021-01-01 RX ADMIN — FUROSEMIDE 40 MG: 10 INJECTION, SOLUTION INTRAMUSCULAR; INTRAVENOUS at 11:09

## 2021-01-01 RX ADMIN — FLUTICASONE PROPIONATE 1 SPRAY: 50 SPRAY, METERED NASAL at 09:26

## 2021-01-01 RX ADMIN — INSULIN LISPRO 11 UNITS: 100 INJECTION, SOLUTION INTRAVENOUS; SUBCUTANEOUS at 08:42

## 2021-01-01 RX ADMIN — FLUTICASONE PROPIONATE 1 SPRAY: 50 SPRAY, METERED NASAL at 20:36

## 2021-01-01 RX ADMIN — INSULIN LISPRO 11 UNITS: 100 INJECTION, SOLUTION INTRAVENOUS; SUBCUTANEOUS at 09:31

## 2021-01-01 RX ADMIN — LEVOFLOXACIN 750 MG: 5 INJECTION, SOLUTION INTRAVENOUS at 21:21

## 2021-01-01 RX ADMIN — Medication 10 ML: at 20:36

## 2021-01-01 RX ADMIN — ATORVASTATIN CALCIUM 80 MG: 40 TABLET, FILM COATED ORAL at 20:36

## 2021-01-01 RX ADMIN — FUROSEMIDE 40 MG: 10 INJECTION INTRAMUSCULAR; INTRAVENOUS at 08:04

## 2021-01-01 RX ADMIN — ENOXAPARIN SODIUM 40 MG: 40 INJECTION SUBCUTANEOUS at 08:41

## 2021-01-01 RX ADMIN — FUROSEMIDE 40 MG: 10 INJECTION INTRAMUSCULAR; INTRAVENOUS at 09:26

## 2021-01-01 RX ADMIN — ATORVASTATIN CALCIUM 80 MG: 40 TABLET, FILM COATED ORAL at 21:34

## 2021-01-01 RX ADMIN — Medication 10 ML: at 21:21

## 2021-01-01 RX ADMIN — INSULIN GLARGINE 30 UNITS: 100 INJECTION, SOLUTION SUBCUTANEOUS at 21:28

## 2021-01-01 RX ADMIN — SODIUM CHLORIDE, PRESERVATIVE FREE 10 ML: 5 INJECTION INTRAVENOUS at 11:08

## 2021-01-01 RX ADMIN — ATORVASTATIN CALCIUM 80 MG: 40 TABLET, FILM COATED ORAL at 21:15

## 2021-01-01 RX ADMIN — MIRTAZAPINE 30 MG: 15 TABLET, FILM COATED ORAL at 21:34

## 2021-01-01 RX ADMIN — IPRATROPIUM BROMIDE AND ALBUTEROL SULFATE 1 AMPULE: .5; 3 SOLUTION RESPIRATORY (INHALATION) at 19:35

## 2021-01-01 RX ADMIN — IPRATROPIUM BROMIDE AND ALBUTEROL SULFATE 1 AMPULE: .5; 3 SOLUTION RESPIRATORY (INHALATION) at 08:12

## 2021-01-01 RX ADMIN — IPRATROPIUM BROMIDE AND ALBUTEROL SULFATE 1 AMPULE: .5; 3 SOLUTION RESPIRATORY (INHALATION) at 20:06

## 2021-01-01 RX ADMIN — BUDESONIDE 500 MCG: 0.5 SUSPENSION RESPIRATORY (INHALATION) at 09:14

## 2021-01-01 RX ADMIN — IPRATROPIUM BROMIDE AND ALBUTEROL SULFATE 1 AMPULE: .5; 3 SOLUTION RESPIRATORY (INHALATION) at 17:15

## 2021-01-01 RX ADMIN — INSULIN GLARGINE 30 UNITS: 100 INJECTION, SOLUTION SUBCUTANEOUS at 21:36

## 2021-01-01 RX ADMIN — Medication 10 ML: at 20:25

## 2021-01-01 RX ADMIN — PANTOPRAZOLE SODIUM 40 MG: 40 TABLET, DELAYED RELEASE ORAL at 06:40

## 2021-01-01 RX ADMIN — BUDESONIDE 500 MCG: 0.5 SUSPENSION RESPIRATORY (INHALATION) at 19:35

## 2021-01-01 RX ADMIN — INSULIN GLARGINE 30 UNITS: 100 INJECTION, SOLUTION SUBCUTANEOUS at 13:40

## 2021-01-01 RX ADMIN — INSULIN LISPRO 8 UNITS: 100 INJECTION, SOLUTION INTRAVENOUS; SUBCUTANEOUS at 11:37

## 2021-01-01 RX ADMIN — INSULIN LISPRO 11 UNITS: 100 INJECTION, SOLUTION INTRAVENOUS; SUBCUTANEOUS at 13:41

## 2021-01-01 RX ADMIN — ENOXAPARIN SODIUM 40 MG: 40 INJECTION SUBCUTANEOUS at 11:08

## 2021-01-01 RX ADMIN — INSULIN LISPRO 20 UNITS: 100 INJECTION, SOLUTION INTRAVENOUS; SUBCUTANEOUS at 16:40

## 2021-01-01 RX ADMIN — ATENOLOL 50 MG: 50 TABLET ORAL at 08:41

## 2021-01-01 RX ADMIN — AMLODIPINE BESYLATE 10 MG: 10 TABLET ORAL at 09:29

## 2021-01-01 RX ADMIN — INSULIN GLARGINE 30 UNITS: 100 INJECTION, SOLUTION SUBCUTANEOUS at 08:47

## 2021-01-01 RX ADMIN — MIRTAZAPINE 30 MG: 15 TABLET, FILM COATED ORAL at 21:15

## 2021-01-01 RX ADMIN — MIRTAZAPINE 30 MG: 15 TABLET, FILM COATED ORAL at 21:21

## 2021-01-01 RX ADMIN — MIRTAZAPINE 30 MG: 15 TABLET, FILM COATED ORAL at 20:50

## 2021-01-01 RX ADMIN — ASPIRIN 81 MG: 81 TABLET, COATED ORAL at 09:21

## 2021-01-01 RX ADMIN — IPRATROPIUM BROMIDE AND ALBUTEROL SULFATE 1 AMPULE: .5; 3 SOLUTION RESPIRATORY (INHALATION) at 12:43

## 2021-01-01 RX ADMIN — IPRATROPIUM BROMIDE AND ALBUTEROL SULFATE 1 AMPULE: .5; 3 SOLUTION RESPIRATORY (INHALATION) at 09:14

## 2021-01-01 RX ADMIN — AMLODIPINE BESYLATE 10 MG: 10 TABLET ORAL at 09:25

## 2021-01-01 RX ADMIN — IPRATROPIUM BROMIDE AND ALBUTEROL SULFATE 1 AMPULE: .5; 3 SOLUTION RESPIRATORY (INHALATION) at 19:20

## 2021-01-01 RX ADMIN — INSULIN LISPRO 11 UNITS: 100 INJECTION, SOLUTION INTRAVENOUS; SUBCUTANEOUS at 12:07

## 2021-01-01 RX ADMIN — ASPIRIN 81 MG: 81 TABLET, COATED ORAL at 09:06

## 2021-01-01 RX ADMIN — BUDESONIDE 500 MCG: 0.5 SUSPENSION RESPIRATORY (INHALATION) at 08:37

## 2021-01-01 RX ADMIN — ARFORMOTEROL TARTRATE 15 MCG: 15 SOLUTION RESPIRATORY (INHALATION) at 19:35

## 2021-01-01 RX ADMIN — ATENOLOL 50 MG: 50 TABLET ORAL at 10:46

## 2021-01-01 RX ADMIN — ENOXAPARIN SODIUM 40 MG: 40 INJECTION SUBCUTANEOUS at 09:21

## 2021-01-01 RX ADMIN — MIRTAZAPINE 30 MG: 15 TABLET, FILM COATED ORAL at 20:25

## 2021-01-01 RX ADMIN — INSULIN GLARGINE 30 UNITS: 100 INJECTION, SOLUTION SUBCUTANEOUS at 20:36

## 2021-01-01 RX ADMIN — INSULIN LISPRO 11 UNITS: 100 INJECTION, SOLUTION INTRAVENOUS; SUBCUTANEOUS at 16:23

## 2021-01-01 RX ADMIN — ATORVASTATIN CALCIUM 80 MG: 40 TABLET, FILM COATED ORAL at 20:25

## 2021-01-01 RX ADMIN — FUROSEMIDE 40 MG: 10 INJECTION, SOLUTION INTRAMUSCULAR; INTRAVENOUS at 12:08

## 2021-01-01 RX ADMIN — ATENOLOL 50 MG: 50 TABLET ORAL at 09:29

## 2021-01-01 RX ADMIN — INSULIN LISPRO 3 UNITS: 100 INJECTION, SOLUTION INTRAVENOUS; SUBCUTANEOUS at 11:38

## 2021-01-01 RX ADMIN — INSULIN GLARGINE 30 UNITS: 100 INJECTION, SOLUTION SUBCUTANEOUS at 08:41

## 2021-01-01 RX ADMIN — IPRATROPIUM BROMIDE AND ALBUTEROL SULFATE 1 AMPULE: .5; 3 SOLUTION RESPIRATORY (INHALATION) at 13:46

## 2021-01-01 RX ADMIN — IPRATROPIUM BROMIDE AND ALBUTEROL SULFATE 1 AMPULE: .5; 3 SOLUTION RESPIRATORY (INHALATION) at 13:05

## 2021-01-01 RX ADMIN — AMLODIPINE BESYLATE 10 MG: 10 TABLET ORAL at 11:28

## 2021-01-01 RX ADMIN — PANTOPRAZOLE SODIUM 40 MG: 40 TABLET, DELAYED RELEASE ORAL at 09:06

## 2021-01-01 RX ADMIN — AMLODIPINE BESYLATE 10 MG: 10 TABLET ORAL at 09:21

## 2021-01-01 RX ADMIN — BUDESONIDE 500 MCG: 0.5 SUSPENSION RESPIRATORY (INHALATION) at 20:06

## 2021-01-01 RX ADMIN — FLUTICASONE PROPIONATE 1 SPRAY: 50 SPRAY, METERED NASAL at 08:46

## 2021-01-01 RX ADMIN — BUDESONIDE 500 MCG: 0.5 SUSPENSION RESPIRATORY (INHALATION) at 19:21

## 2021-01-01 RX ADMIN — FUROSEMIDE 40 MG: 10 INJECTION INTRAMUSCULAR; INTRAVENOUS at 17:38

## 2021-01-01 RX ADMIN — INSULIN LISPRO 11 UNITS: 100 INJECTION, SOLUTION INTRAVENOUS; SUBCUTANEOUS at 14:07

## 2021-01-01 RX ADMIN — AMLODIPINE BESYLATE 10 MG: 10 TABLET ORAL at 09:02

## 2021-01-01 RX ADMIN — Medication 10 ML: at 08:48

## 2021-01-01 RX ADMIN — ATORVASTATIN CALCIUM 80 MG: 40 TABLET, FILM COATED ORAL at 20:50

## 2021-01-01 RX ADMIN — FUROSEMIDE 20 MG: 10 INJECTION, SOLUTION INTRAMUSCULAR; INTRAVENOUS at 10:47

## 2021-01-01 RX ADMIN — IPRATROPIUM BROMIDE AND ALBUTEROL SULFATE 1 AMPULE: .5; 3 SOLUTION RESPIRATORY (INHALATION) at 08:37

## 2021-01-01 RX ADMIN — INSULIN LISPRO 8 UNITS: 100 INJECTION, SOLUTION INTRAVENOUS; SUBCUTANEOUS at 09:07

## 2021-01-01 RX ADMIN — ATORVASTATIN CALCIUM 80 MG: 40 TABLET, FILM COATED ORAL at 21:21

## 2021-01-01 RX ADMIN — Medication 10 ML: at 20:50

## 2021-01-01 RX ADMIN — ENOXAPARIN SODIUM 40 MG: 40 INJECTION SUBCUTANEOUS at 08:05

## 2021-01-01 RX ADMIN — INSULIN LISPRO 11 UNITS: 100 INJECTION, SOLUTION INTRAVENOUS; SUBCUTANEOUS at 17:05

## 2021-01-01 RX ADMIN — BUDESONIDE 500 MCG: 0.5 SUSPENSION RESPIRATORY (INHALATION) at 08:12

## 2021-01-01 RX ADMIN — ATENOLOL 50 MG: 50 TABLET ORAL at 09:21

## 2021-01-01 RX ADMIN — PANTOPRAZOLE SODIUM 40 MG: 40 TABLET, DELAYED RELEASE ORAL at 06:17

## 2021-01-01 RX ADMIN — ASPIRIN 81 MG: 81 TABLET, COATED ORAL at 09:29

## 2021-01-01 RX ADMIN — ASPIRIN 81 MG: 81 TABLET, COATED ORAL at 08:46

## 2021-01-01 RX ADMIN — INSULIN GLARGINE 30 UNITS: 100 INJECTION, SOLUTION SUBCUTANEOUS at 20:51

## 2021-01-01 RX ADMIN — INSULIN LISPRO 11 UNITS: 100 INJECTION, SOLUTION INTRAVENOUS; SUBCUTANEOUS at 17:22

## 2021-01-01 RX ADMIN — BUDESONIDE 500 MCG: 0.5 SUSPENSION RESPIRATORY (INHALATION) at 20:50

## 2021-01-01 RX ADMIN — Medication 10 ML: at 09:21

## 2021-01-01 RX ADMIN — ASPIRIN 81 MG: 81 TABLET, COATED ORAL at 11:08

## 2021-01-01 RX ADMIN — IPRATROPIUM BROMIDE AND ALBUTEROL SULFATE 1 AMPULE: .5; 3 SOLUTION RESPIRATORY (INHALATION) at 12:51

## 2021-01-01 RX ADMIN — FLUTICASONE PROPIONATE 1 SPRAY: 50 SPRAY, METERED NASAL at 20:25

## 2021-01-01 RX ADMIN — FUROSEMIDE 40 MG: 10 INJECTION INTRAMUSCULAR; INTRAVENOUS at 17:05

## 2021-01-01 SDOH — ECONOMIC STABILITY: FOOD INSECURITY: WITHIN THE PAST 12 MONTHS, THE FOOD YOU BOUGHT JUST DIDN'T LAST AND YOU DIDN'T HAVE MONEY TO GET MORE.: SOMETIMES TRUE

## 2021-01-01 SDOH — ECONOMIC STABILITY: FOOD INSECURITY: WITHIN THE PAST 12 MONTHS, YOU WORRIED THAT YOUR FOOD WOULD RUN OUT BEFORE YOU GOT MONEY TO BUY MORE.: SOMETIMES TRUE

## 2021-01-01 ASSESSMENT — ENCOUNTER SYMPTOMS
EYE REDNESS: 0
BLOOD IN STOOL: 0
BACK PAIN: 0
SHORTNESS OF BREATH: 0
COUGH: 0
ABDOMINAL PAIN: 0
ABDOMINAL PAIN: 0
SHORTNESS OF BREATH: 1
CONSTIPATION: 0
SHORTNESS OF BREATH: 1
NAUSEA: 0
VOMITING: 0
ABDOMINAL PAIN: 0
WHEEZING: 0
COUGH: 0
EYE PAIN: 0
BACK PAIN: 0
SORE THROAT: 0
COUGH: 0
WHEEZING: 0
SHORTNESS OF BREATH: 1
VOMITING: 0
SINUS PRESSURE: 0
EYE DISCHARGE: 0
DIARRHEA: 0
NAUSEA: 0
DIARRHEA: 0

## 2021-01-01 ASSESSMENT — PATIENT HEALTH QUESTIONNAIRE - PHQ9
1. LITTLE INTEREST OR PLEASURE IN DOING THINGS: 1
SUM OF ALL RESPONSES TO PHQ9 QUESTIONS 1 & 2: 1
SUM OF ALL RESPONSES TO PHQ QUESTIONS 1-9: 1
2. FEELING DOWN, DEPRESSED OR HOPELESS: 0
SUM OF ALL RESPONSES TO PHQ QUESTIONS 1-9: 1
SUM OF ALL RESPONSES TO PHQ QUESTIONS 1-9: 1

## 2021-01-01 ASSESSMENT — PAIN SCALES - GENERAL
PAINLEVEL_OUTOF10: 0
PAINLEVEL_OUTOF10: 10
PAINLEVEL_OUTOF10: 0
PAINLEVEL_OUTOF10: 7
PAINLEVEL_OUTOF10: 0
PAINLEVEL_OUTOF10: 10
PAINLEVEL_OUTOF10: 0

## 2021-01-01 ASSESSMENT — SOCIAL DETERMINANTS OF HEALTH (SDOH): HOW HARD IS IT FOR YOU TO PAY FOR THE VERY BASICS LIKE FOOD, HOUSING, MEDICAL CARE, AND HEATING?: NOT VERY HARD

## 2021-01-01 ASSESSMENT — LIFESTYLE VARIABLES: HOW OFTEN DO YOU HAVE A DRINK CONTAINING ALCOHOL: 0

## 2021-01-01 ASSESSMENT — PAIN DESCRIPTION - PAIN TYPE: TYPE: ACUTE PAIN

## 2021-01-01 ASSESSMENT — PAIN DESCRIPTION - LOCATION: LOCATION: FOOT

## 2021-02-19 NOTE — TELEPHONE ENCOUNTER
Last Appointment:  11/24/2020  Future Appointments   Date Time Provider Alex Anders   2/25/2021 10:20 AM DO Mayra Singh [facility]   3/29/2021 10:30 AM Bruna Pineda MD [address] [place] [address]

## 2021-02-25 NOTE — PROGRESS NOTES
[facility]  FAMILY MEDICINE RESIDENCY PROGRAM  DATE OF VISIT : 3/1/2021    Patient : Cristina Hodge   Age : 61 y.o.  : 1960   MRN : [id number]   ______________________________________________________________________    Chief Complaint :   Chief Complaint   Patient presents with    Diabetes     check up       HPI : Cristina Hodge is 61 y.o. male who presented to the clinic today for follow up of diabetes and other chronic concerns. Patient needs medication refills today. Patient reports that he checks his blood glucose about 2x a day, and usually runs from 130-165. He denies hypoglycemic episodes. He takes 50 U of Novolin BID, and 20 units of novolog in the morning unless his blood glucose is less than 130. He takes gabapentin for diabetic neuropathy, which he states helps. Patient has difficulty falling asleep, and reports taking 1-2 remeron, 4-5 trazadone, and 3 gabapentin at night. He reports low mood but does not want medication to treat it. He is interested in knowing if he qualifies for assistance of any sort for around his house. He is currently on 3L O2, denies any current SOB. Past Medical History :  Past Medical History:   Diagnosis Date    Anxiety     Asthma     CAD (coronary artery disease)     2 stents in  .  CHF (congestive heart failure) (HCC)     Chills     Chronic sinusitis     Cocaine abuse ([address]     COPD (chronic obstructive pulmonary disease) (HCC)     Depression     Diabetes mellitus (HCC)     on insulin    GERD (gastroesophageal reflux disease)     H/O cardiovascular stress test     Hand fracture 3/2010    Fractured both hands.  Hepatitis C     Work up was negative. Saw specialist. Negative viral load.  History of tobacco abuse     Hyperlipidemia     Hypertension     Night sweats     NSTEMI (non-ST elevation myocardial infarction) ([address] 3/2010    Hospitalized.      Obesity     Oxygen dependent     Panic attacks  Pneumonia 3/2010     LLL.  Sleep apnea     Has CPAP machine    SOB (shortness of breath)     does not know settings     Past Surgical History:   Procedure Laterality Date    COLONOSCOPY  2011    COLONOSCOPY  9/23/15   [facility] DIAGNOSTIC CARDIAC CATH LAB PROCEDURE  2/25/2009    SE, cardiac cath/primary BM stent in proximal LAD.  OTHER SURGICAL HISTORY      anal fistula    TONSILLECTOMY         Allergies : Allergies   Allergen Reactions    Amoxicillin Hives and Shortness Of Breath    Penicillins Hives and Shortness Of Breath     TRIAMOX       Medication List :    Current Outpatient Medications   Medication Sig Dispense Refill    traZODone (DESYREL) 100 MG tablet Take 2 tablets by mouth nightly 120 tablet 3    albuterol (PROVENTIL) (2.5 MG/3ML) 0.083% nebulizer solution Take 3 mLs by nebulization 4 times daily inhale contents of 1 vial in nebulizer four times a day 1080 mL 1    insulin aspart (NOVOLOG PENFILL) 100 UNIT/ML injection cartridge Inject 20 Units into the skin daily 5 Cartridge 3    insulin 70-30 (NOVOLIN 70/30) (70-30) 100 UNIT per ML injection vial Inject 55 Units into the skin 2 times daily 8 vial 3    gabapentin (NEURONTIN) 300 MG capsule Take 1 capsule by mouth 3 times daily for 180 days. Intended supply: 90 days 270 capsule 1    atorvastatin (LIPITOR) 80 MG tablet take 1 tablet by mouth once daily 90 tablet 1    blood glucose monitor strips Test 3 times a day & as needed for symptoms of irregular blood glucose. Dispense sufficient amount for indicated testing frequency plus additional to accommodate PRN testing needs.  300 strip 1    docusate sodium (DOCQLACE) 100 MG capsule Take 1 capsule by mouth 2 times daily 60 capsule 5    mirtazapine (REMERON) 30 MG tablet take 1 tablet by mouth every evening 60 tablet 3    finasteride (PROSCAR) 5 MG tablet Take 1 tablet by mouth daily 90 tablet 3  B-D INS SYR ULTRAFINE 1CC/30G 30G X 1/2\" 1 ML MISC 1 each by Does not apply route 3 times daily 100 each 3    amLODIPine (NORVASC) 10 MG tablet take 1 tablet by mouth once daily 90 tablet 1    omeprazole (PRILOSEC) 20 MG delayed release capsule Take 1 capsule by mouth daily 90 capsule 1    losartan (COZAAR) 25 MG tablet Take 1 tablet by mouth daily 90 tablet 0    atenolol (TENORMIN) 50 MG tablet take 1 tablet by mouth twice a day 180 tablet 1    metFORMIN (GLUCOPHAGE) 1000 MG tablet take 1 tablet by mouth twice a day with meals 180 tablet 1    hydroCHLOROthiazide (HYDRODIURIL) 25 MG tablet take 1 tablet by mouth once daily 90 tablet 1    tamsulosin (FLOMAX) 0.4 MG capsule take 1 capsule by mouth once daily 90 capsule 1    promethazine (PHENERGAN) 25 MG tablet Take 1 tablet by mouth every 6 hours as needed for Nausea 30 tablet 3    aspirin 81 MG EC tablet Take 1 tablet by mouth daily 90 tablet 1    Omega-3 Fatty Acids (FISH OIL) 1000 MG CAPS Take 1 capsule by mouth daily 90 capsule 1    Lancets MISC Check sugars as directed. Tanika Letters for pharmacy to substitute 100 each 5    Insulin Syringes, Disposable, U-100 1 ML MISC 1 each by Does not apply route daily 100 each 3    Handicap Placard MISC by Does not apply route 1 each 0    glucose monitoring kit (FREESTYLE) monitoring kit 1 kit by Does not apply route daily 1 kit 0    beclomethasone (QVAR) 80 MCG/ACT inhaler Inhale 2 puffs into the lungs 2 times daily      OXYGEN Inhale 6 L into the lungs continuous       Ipratropium-Albuterol (COMBIVENT IN) Inhale 2 puffs into the lungs 2 times daily        No current facility-administered medications for this visit. Review of Systems :  Review of Systems   Constitutional: Negative for fever. Respiratory: Negative for cough and shortness of breath. Gastrointestinal: Negative for abdominal pain.    Neurological:        Reports neuropathy in feet Psychiatric/Behavioral: Positive for dysphoric mood and sleep disturbance.     ______________________________________________________________________    Physical Exam :    Vitals: /72 (Site: Left Upper Arm, Position: Sitting, Cuff Size: Large Adult)   Pulse 85   Temp 97.1 °F (36.2 °C) (Temporal)   Ht 6' (1.829 m)   Wt (!) 385 lb (174.6 kg)   SpO2 (!) 88%   BMI 52.22 kg/m²   General Appearance: Well developed, awake, alert, oriented, and in NAD  HEENT: NCAT, MMM, no pallor or icterus. Neck: Symmetrical, trachea midline. Chest wall/Lung: CTAB, respirations unlabored. No ronchi/wheezing/rales   Heart: RRR, normal S1 and S2, no murmurs, rubs or gallops. Abdomen: SNTND  Extremities: Extremities normal, atraumatic, no cyanosis, clubbing or edema. Skin: Skin color, texture, turgor normal, no rashes or lesions  Musculokeletal: ROM grossly normal in all joints of extremities, no obvious joint swelling. Neurologic: Alert&Oriented x3. No focal motor deficits detected. Psychiatric: Normal mood. Normal affect. Normal behavior. Visual inspection:  Deformity/amputation: absent  Skin lesions/pre-ulcerative calluses: absent  Edema: right- negative, left- negative    Sensory exam:  Monofilament sensation: normal  (minimum of 5 random plantar locations tested, avoiding callused areas - > 1 area with absence of sensation is + for neuropathy)    Plus at least one of the following:  Pulses: normal,   Proprioception: Intact    ______________________________________________________________________    Assessment & Plan :    Medication refill  - finasteride (PROSCAR) 5 MG tablet; Take 1 tablet by mouth daily  Dispense: 90 tablet; Refill: 3    Other insomnia  · Discussed extensively with patient risks of taking multiple medications with multiple doses to fall asleep. Explained that he needs to start cutting down on what he uses. Decreased trazodone to no more than 2 tablets nightly, with goal of decreasing to one. - traZODone (DESYREL) 100 MG tablet; Take 2 tablets by mouth nightly  Dispense: 120 tablet; Refill: 3  - mirtazapine (REMERON) 30 MG tablet; take 1 tablet by mouth every evening  Dispense: 60 tablet; Refill: 3    Pulmonary emphysema, unspecified emphysema type (HCC)  - albuterol (PROVENTIL) (2.5 MG/3ML) 0.083% nebulizer solution; Take 3 mLs by nebulization 4 times daily inhale contents of 1 vial in nebulizer four times a day  Dispense: 1080 mL; Refill: 1    Diabetes mellitus type 2 in obese (HCC)  - insulin aspart (NOVOLOG PENFILL) 100 UNIT/ML injection cartridge; Inject 20 Units into the skin daily  Dispense: 5 Cartridge; Refill: 3  - insulin 70-30 (NOVOLIN 70/30) (70-30) 100 UNIT per ML injection vial; Inject 55 Units into the skin 2 times daily  Dispense: 8 vial; Refill: 3  - atorvastatin (LIPITOR) 80 MG tablet; take 1 tablet by mouth once daily  Dispense: 90 tablet; Refill: 1  - blood glucose monitor strips; Test 3 times a day & as needed for symptoms of irregular blood glucose. Dispense sufficient amount for indicated testing frequency plus additional to accommodate PRN testing needs. Dispense: 300 strip; Refill: 1  - B-D INS SYR ULTRAFINE 1CC/30G 30G X 1/2\" 1 ML MISC; 1 each by Does not apply route 3 times daily  Dispense: 100 each; Refill: 3  - POCT glycosylated hemoglobin (Hb A1C)  - LIPID PANEL; Future    Neuropathy  · Patient takes at night to prevent foot pain at night and to help him fall asleep. Discussed risks of taking multiple medications at the same time to fall asleep  - gabapentin (NEURONTIN) 300 MG capsule; Take 1 capsule by mouth 3 times daily for 180 days. Intended supply: 90 days  Dispense: 270 capsule; Refill: 1      Additional plan and future considerations:       Return to Office: Return in about 4 weeks (around 3/25/2021) for insomnia.     Connie Tavera DO   Case discussed with Dr. Carline Nuñez

## 2021-02-25 NOTE — PROGRESS NOTES
Attending Physician Statement    S:   Chief Complaint   Patient presents with    Diabetes     check up      Patient is a 61year old male here for follow up of diabetes, copd, htn. Also has issues with sleep. He takes trazodone between 400-600mg a night. As well as 900mg of gabapentin as well as Remeron 30mg. Has been falling asleep without trazodone this week. Has cpap and uses it nightly. Uses nightly. Diabetes a1c 6.9 . COPD - is on chronic oxygen. 3LNC. Denies any shortness of breath currently. O: Blood pressure 134/72, pulse 85, temperature 97.1 °F (36.2 °C), temperature source Temporal, height 6' (1.829 m), weight (!) 385 lb (174.6 kg), SpO2 (!) 88 %. Exam:   Heart - RRR   Lungs - clear     A: insomnia, dm , copd  P:  Discussed need to decrease trazodone dose to 200mg daily    Continue diabetic meds. Follow up with pulm    Follow-up as ordered    Attending Attestation   I have discussed the case, including pertinent history and exam findings with the resident. I agree with the documented assessment and plan.

## 2021-03-03 NOTE — TELEPHONE ENCOUNTER
Last Appointment:  2/25/2021  Future Appointments   Date Time Provider Alex Anders   3/29/2021 10:30 AM MD VEDA Pacheco [facility]   4/13/2021 10:20 AM Tessie Canard, DO Dorrie Habermann [facility]

## 2021-03-11 NOTE — TELEPHONE ENCOUNTER
Gwen Reyes called in and is needing a new order for his glucose machine, he is asking if you could send one in for a One Touch Ultra, he will need lancets and strips also.   Thank you

## 2021-03-29 NOTE — PROGRESS NOTES
Called patient to discuss concern about running out of trazodone 100 mg early because he has been taking 4 tablets nightly as he always has, instead of the 2 prescribed. He states gabapentin is also not working to prevent the burning in his feet anymore. He takes 3 tablets of 300 mg at once. As this is not working for him, and as he stopped taking benadryl to help him sleep previously with these medications, patient agreed to stop the gabapentin completely and take his usual dose of trazodone (4 tablets of 100 mg at night) instead. 15 day supply was given with NO refills, and pharmacy was called to cancel previous script of trazodone as well as gabapentin. Plan for next visit to discuss lowering trazodone dose again, and to possibly replace gabapentin with lyrica.  Also plan to refer to sleep specialist.

## 2021-03-29 NOTE — TELEPHONE ENCOUNTER
Patient phoned stated that he would like his trazodone 100 mg 4 po nighty  Patient stated that he has been taking that way with this last refill and now out of his medication because doctor only gave him 2 po nighty.   Please advise  Thank you April

## 2021-03-29 NOTE — PROGRESS NOTES
OUTPATIENT CARDIOLOGY FOLLOW-UP    HPI:    Name: Salvador Vanessa    Age: 61 y.o. Primary Care Physician: Reshma Beck DO    Date of Service: 3/29/2021    Chief Complaint:   Chief Complaint   Patient presents with    Coronary Artery Disease     13 mo ov         History of present illness :  27-year-old morbidly obese male who comes today for one-year follow-up visit. He was seen in my office on 2/14/2020. Ramona Profit He has history of CAD, status post PCI to LAD in 2009, hypertension, grade 2 diastolic dysfunction, COPD oxygen dependent,  hyperlipidemia, obstructive sleep apnea treated with CPAP at night, chronic kidney disease, hepatitis C antibody positive, GERD, diabetes, cocaine abuse, depression, and BPH. Patient is not active. He denies any chest discomfort recently. He complains of dyspnea on exertion. He denies palpitations, dizziness or syncope. He denies lower extremity edema. EKG done today revealed sinus rhythm at 80 bpm, first-degree AV block with NC interval 264 ms, left bundle fascicular block, right bundle branch block with nonspecific ST-T wave changes. Review of Systems:   Review of Systems   Constitutional: Positive for fatigue. Negative for chills and fever. Receiving oxygen. HENT: Negative for nosebleeds. Respiratory: Positive for shortness of breath. Negative for cough and wheezing. Gastrointestinal: Negative for abdominal pain, blood in stool, constipation, diarrhea, nausea and vomiting. Genitourinary: Negative for dysuria and hematuria. Musculoskeletal: Negative for back pain, joint swelling and myalgias. Aching. Neurological: Negative for syncope and light-headedness. Psychiatric/Behavioral: The patient is not nervous/anxious. Past Medical History:  Past Medical History:   Diagnosis Date    Anxiety     Asthma     CAD (coronary artery disease) 2008    2 stents in 2008 .     CH[facility]cong[facility]) [facility])     Chills     Chronic sinusitis     Cocaine abuse ([facility]     COPD (chronic obstructive pulmonary disease) ([facility])     Depression     Diabetes mellitus ([facility]     on insulin    GERD (gastroesophageal reflux disease)     H/O cardiovascular stress test     Hand fracture 3/2010    Fractured both hands.  Hepatitis C     Work up was negative. Saw specialist. Negative viral load.  History of tobacco abuse     Hyperlipidemia     Hypertension     Night sweats     NSTEMI (non-ST elevation myocardial infarction) ([facility] 3/2010    Hospitalized.  Obesity     Oxygen dependent     Panic attacks     Pneumonia 3/2010     LLL.  Sleep apnea     Has CPAP machine    SOB (shortness of breath)     does not know settings       Past Surgical History:  Past Surgical History:   Procedure Laterality Date    COLONOSCOPY      COLONOSCOPY  9/23/15    DIAGNOSTIC CARDIAC CATH LAB PROCEDURE  2009    SE, cardiac cath/primary BM stent in proximal LAD.  OTHER SURGICAL HISTORY      anal fistula    TONSILLECTOMY         Family History:  Family History   Problem Relation Age of Onset    Heart Failure Father     Diabetes Mother     Heart Surgery Brother        Social History:  Social History     Socioeconomic History    Marital status: Single     Spouse name: Not on file    Number of children: Not on file    Years of education: 15    Highest education level: Not on file   Occupational History    Occupation: disabled-   Social Needs    Financial resource strain: Not on file    Food insecurity     Worry: Not on file     Inability: Not on file   [employer] needs     Medical: Not on file     Non-medical: Not on file   Tobacco Use    Smoking status: Former Smoker     Packs/day: 2.00     Years: 38.00     Pack years: 76.00     Types: Cigarettes     Start date: 36     Quit date: 2008     Years since quittin.6    Smokeless tobacco: Never Used   Substance and Sexual Activity    Alcohol use:  No docusate sodium (DOCQLACE) 100 MG capsule Take 1 capsule by mouth 2 times daily 60 capsule 5    mirtazapine (REMERON) 30 MG tablet take 1 tablet by mouth every evening 60 tablet 3    finasteride (PROSCAR) 5 MG tablet Take 1 tablet by mouth daily 90 tablet 3    amLODIPine (NORVASC) 10 MG tablet take 1 tablet by mouth once daily 90 tablet 1    omeprazole (PRILOSEC) 20 MG delayed release capsule Take 1 capsule by mouth daily 90 capsule 1    losartan (COZAAR) 25 MG tablet Take 1 tablet by mouth daily 90 tablet 0    atenolol (TENORMIN) 50 MG tablet take 1 tablet by mouth twice a day 180 tablet 1    metFORMIN (GLUCOPHAGE) 1000 MG tablet take 1 tablet by mouth twice a day with meals 180 tablet 1    hydroCHLOROthiazide (HYDRODIURIL) 25 MG tablet take 1 tablet by mouth once daily 90 tablet 1    tamsulosin (FLOMAX) 0.4 MG capsule take 1 capsule by mouth once daily 90 capsule 1    promethazine (PHENERGAN) 25 MG tablet Take 1 tablet by mouth every 6 hours as needed for Nausea 30 tablet 3    aspirin 81 MG EC tablet Take 1 tablet by mouth daily 90 tablet 1    Omega-3 Fatty Acids (FISH OIL) 1000 MG CAPS Take 1 capsule by mouth daily 90 capsule 1    beclomethasone (QVAR) 80 MCG/ACT inhaler Inhale 2 puffs into the lungs 2 times daily      OXYGEN Inhale 6 L into the lungs continuous       Ipratropium-Albuterol (COMBIVENT IN) Inhale 2 puffs into the lungs 2 times daily       Blood Glucose Monitoring Suppl (ONE TOUCH ULTRA 2) w/Device KIT 1 kit by Does not apply route daily 1 kit 0    blood glucose monitor strips Test 3 times a day & as needed for symptoms of irregular blood glucose. Dispense sufficient amount for indicated testing frequency plus additional to accommodate PRN testing needs.  300 strip 1    blood glucose test strips (ASCENSIA AUTODISC VI;ONE TOUCH ULTRA TEST VI) strip 1 each by Does not apply route 3 times daily 100 strip 5    B-D INS SYR ULTRAFINE 1CC/30G 30G X 1/2\" 1 ML MISC 1 each by Does not apply route 3 times daily 100 each 3    Lancets MISC Check sugars as directed. [address] Monie Nguyen for pharmacy to substitute 100 each 5    Insulin Syringes, Disposable, U-100 1 ML MISC 1 each by Does not apply route daily 100 each 3    Handicap Placard MISC by Does not apply route 1 each 0    glucose monitoring kit (FREESTYLE) monitoring kit 1 kit by Does not apply route daily 1 kit 0     No current facility-administered medications for this visit. Physical Exam:  /74   Pulse 80   Resp 26   Ht 6' (1.829 m)   Wt (!) 381 lb 9.6 oz (173.1 kg)   BMI 51.75 kg/m²   Wt Readings from Last 3 Encounters:   03/29/21 (!) 381 lb 9.6 oz (173.1 kg)   02/25/21 (!) 385 lb (174.6 kg)   11/24/20 (!) 376 lb (170.6 kg)       Physical Exam  Constitutional:       General: He is not in acute distress. Appearance: He is well-developed. He is obese. HENT:      Head: Normocephalic and atraumatic. Neck:      Musculoskeletal: Neck supple. Vascular: No carotid bruit or JVD. Cardiovascular:      Rate and Rhythm: Normal rate and regular rhythm. Heart sounds: No murmur. No friction rub. No gallop. Comments: Very distant heart sounds. Pulmonary:      Breath sounds: No wheezing or rales. Comments: Fair air entry with no wheezing but expiratory rhonchi. Chest:      Chest wall: No tenderness. Abdominal:      General: Bowel sounds are normal. There is no distension. Palpations: Abdomen is soft. There is no mass. Tenderness: There is no abdominal tenderness. Comments: No abdominal bruit. Musculoskeletal:      Right lower leg: No edema. Left lower leg: No edema. Skin:     General: Skin is warm and dry. Neurological:      Mental Status: He is alert and oriented to person, place, and time.            Laboratory Tests:  Lab Results   Component Value Date    CREATININE 1.4 (H) 01/08/2021    BUN 21 01/08/2021     01/08/2021    K 4.9 01/08/2021    CL 99 01/08/2021    CO2 29 01/08/2021     Lab Results   Component Value Date    MG 1.4 01/09/2020     Lab Results   Component Value Date    WBC 11.4 01/08/2021    HGB 14.4 01/08/2021    HCT 46.6 01/08/2021    MCV 88.1 01/08/2021     01/08/2021     Lab Results   Component Value Date    ALT 25 01/08/2021    AST 19 01/08/2021    ALKPHOS 56 01/08/2021    BILITOT 0.3 01/08/2021     Lab Results   Component Value Date    CKTOTAL 106 01/03/2016    CKMB 1.5 01/03/2016    TROPONINI <0.01 01/04/2016    TROPONINI <0.01 01/04/2016    TROPONINI <0.01 01/03/2016     Lab Results   Component Value Date    INR 1.2 02/22/2014    INR 1.2 10/02/2013    PROTIME 13.0 02/22/2014    PROTIME 13.0 (H) 10/02/2013     Lab Results   Component Value Date    TSH 1.930 01/09/2020     Lab Results   Component Value Date    LABA1C 6.9 11/24/2020       Lab Results   Component Value Date    CHOL 161 01/09/2020    CHOL 192 07/19/2019    CHOL 183 01/29/2018     Lab Results   Component Value Date    TRIG 367 (H) 01/09/2020    TRIG 519 (H) 07/19/2019    TRIG 362 (H) 01/29/2018     Lab Results   Component Value Date    HDL 39 01/09/2020    HDL 27 07/19/2019    HDL 33 01/29/2018     Lab Results   Component Value Date    LDLCALC 49 01/09/2020    [address] - (AA) 07/19/2019    LDLCALC 78 01/29/2018     Lab Results   Component Value Date    LABVLDL 73 01/09/2020    LABVLDL - (AA) 07/19/2019    LABVLDL 72 01/29/2018       Cardiac Tests:    Echocardiogram: Done in April 2016 was technically very difficult. Cardiac valves were not well seen. Ejection fraction was 65%. ASSESSMENT / PLAN:  -CAD: Status post PCI to LAD in 2009. Stable with no angina. -Grade 2 diastolic dysfunction: Clinically compensated. -Hypertension: Controlled. -Bifascicular block with first-degree AV block. -Hyperlipidemia: On high-dose atorvastatin. -COPD oxygen dependent.  -Obstructive sleep apnea treated with CPAP at night.  -Chronic kidney disease.  -Diabetes. -GERD.   -Depression.  -History of hepatitis C antibody positive. -Morbid obesity. We will continue current cardiac medications. Patient was advised to lose weight and to keep using his CPAP at night. We will follow-up at the office in 1 year. The patient's current medication list, allergies, problem list and results of all previously ordered testing were reviewed at today's visit. {  Vaughn Hall MD , [place], [place].   [facility]) Cardiology

## 2021-04-13 NOTE — PROGRESS NOTES
Work up was negative. Saw specialist. Negative viral load.  History of tobacco abuse     Hyperlipidemia     Hypertension     Night sweats     NSTEMI (non-ST elevation myocardial infarction) ([address] 3/2010    Hospitalized.  Obesity     Oxygen dependent     Panic attacks     Pneumonia 3/2010     LLL.  Sleep apnea     Has CPAP machine    SOB (shortness of breath)     does not know settings     Past Surgical History:   Procedure Laterality Date    COLONOSCOPY  2011    COLONOSCOPY  9/23/15   [facility] DIAGNOSTIC CARDIAC CATH LAB PROCEDURE  2/25/2009    SE, cardiac cath/primary BM stent in proximal LAD.  OTHER SURGICAL HISTORY      anal fistula    TONSILLECTOMY         Allergies : Allergies   Allergen Reactions    Amoxicillin Hives and Shortness Of Breath    Penicillins Hives and Shortness Of Breath     TRIAMOX       Medication List :    Current Outpatient Medications   Medication Sig Dispense Refill    traZODone (DESYREL) 100 MG tablet Take 4 tablets by mouth nightly 120 tablet 0    gabapentin (NEURONTIN) 800 MG tablet Take 1 tablet by mouth nightly for 30 days. 30 tablet 1    Blood Glucose Monitoring Suppl (ONE TOUCH ULTRA 2) w/Device KIT 1 kit by Does not apply route daily 1 kit 0    blood glucose monitor strips Test 3 times a day & as needed for symptoms of irregular blood glucose. Dispense sufficient amount for indicated testing frequency plus additional to accommodate PRN testing needs.  300 strip 1    blood glucose test strips (ASCENSIA AUTODISC VI;ONE TOUCH ULTRA TEST VI) strip 1 each by Does not apply route 3 times daily 100 strip 5    albuterol (PROVENTIL) (2.5 MG/3ML) 0.083% nebulizer solution Take 3 mLs by nebulization 4 times daily inhale contents of 1 vial in nebulizer four times a day 1080 mL 1    insulin aspart (NOVOLOG PENFILL) 100 UNIT/ML injection cartridge Inject 20 Units into the skin daily 5 Cartridge 3    insulin 70-30 (NOVOLIN 70/30) (70-30) 100 UNIT per ML injection vial Inject 55 Units into the skin 2 times daily 8 vial 3    atorvastatin (LIPITOR) 80 MG tablet take 1 tablet by mouth once daily 90 tablet 1    docusate sodium (DOCQLACE) 100 MG capsule Take 1 capsule by mouth 2 times daily 60 capsule 5    mirtazapine (REMERON) 30 MG tablet take 1 tablet by mouth every evening 60 tablet 3    finasteride (PROSCAR) 5 MG tablet Take 1 tablet by mouth daily 90 tablet 3    B-D INS SYR ULTRAFINE 1CC/30G 30G X 1/2\" 1 ML MISC 1 each by Does not apply route 3 times daily 100 each 3    amLODIPine (NORVASC) 10 MG tablet take 1 tablet by mouth once daily 90 tablet 1    omeprazole (PRILOSEC) 20 MG delayed release capsule Take 1 capsule by mouth daily 90 capsule 1    losartan (COZAAR) 25 MG tablet Take 1 tablet by mouth daily 90 tablet 0    atenolol (TENORMIN) 50 MG tablet take 1 tablet by mouth twice a day 180 tablet 1    metFORMIN (GLUCOPHAGE) 1000 MG tablet take 1 tablet by mouth twice a day with meals 180 tablet 1    hydroCHLOROthiazide (HYDRODIURIL) 25 MG tablet take 1 tablet by mouth once daily 90 tablet 1    tamsulosin (FLOMAX) 0.4 MG capsule take 1 capsule by mouth once daily 90 capsule 1    promethazine (PHENERGAN) 25 MG tablet Take 1 tablet by mouth every 6 hours as needed for Nausea 30 tablet 3    aspirin 81 MG EC tablet Take 1 tablet by mouth daily 90 tablet 1    Omega-3 Fatty Acids (FISH OIL) 1000 MG CAPS Take 1 capsule by mouth daily 90 capsule 1    Lancets MISC Check sugars as directed.  485[address] Monie Nguyen for pharmacy to substitute 100 each 5    Insulin Syringes, Disposable, U-100 1 ML MISC 1 each by Does not apply route daily 100 each 3    Handicap Placard MISC by Does not apply route 1 each 0    glucose monitoring kit (FREESTYLE) monitoring kit 1 kit by Does not apply route daily 1 kit 0    beclomethasone (QVAR) 80 MCG/ACT inhaler Inhale 2 puffs into the lungs 2 times daily      OXYGEN Inhale 6 L into the lungs continuous       Ipratropium-Albuterol (COMBIVENT IN) Inhale 2 puffs into the lungs 2 times daily        No current facility-administered medications for this visit. Review of Systems :  Review of Systems   Constitutional: Negative for fever. HENT:        Dry mouth   Respiratory: Positive for shortness of breath. Cardiovascular: Negative for chest pain. Endocrine: Negative for polydipsia and polyuria. Genitourinary:        Nocturia   Neurological: Positive for numbness (and tingling in feet). Negative for dizziness. Psychiatric/Behavioral: Positive for sleep disturbance.     ______________________________________________________________________    Physical Exam :    Vitals: BP (!) 140/70 (Site: Left Upper Arm, Position: Sitting, Cuff Size: Large Adult)   Pulse 72   Temp 98 °F (36.7 °C) (Temporal)   Ht 6' (1.829 m)   Wt (!) 383 lb (173.7 kg)   SpO2 90%   BMI 51.94 kg/m²   General Appearance: Well developed, awake, alert, oriented, and in NAD  HEENT: NCAT, MMM, no pallor or icterus. Neck: Symmetrical, trachea midline. Chest wall/Lung: CTAB, respirations unlabored. No ronchi/wheezing/rales   Heart: RRR, normal S1 and S2, no murmurs, rubs or gallops. Abdomen: SNTND  Extremities: Extremities normal, atraumatic, no cyanosis, clubbing or edema. Skin: Skin color, texture, turgor normal, no rashes or lesions  Musculokeletal: ROM grossly normal in all joints of extremities, no obvious joint swelling. Neurologic: Alert&Oriented x3. No focal motor deficits detected. Psychiatric: Normal mood. Normal affect. Normal behavior. ______________________________________________________________________    Assessment & Plan :    Other insomnia  -discussed better sleep habits, consider referral to sleep specialist.    -discussed with patient that at next visit we will need to try backing down on the trazodone  - traZODone (DESYREL) 100 MG tablet; Take 4 tablets by mouth nightly  Dispense: 120 tablet;  Refill: 0    Neuropathy  · Restart gabapentin  -Originally plan on starting Lyrica, however concern for past substance abuse and for current use of trazodone make starting Lyrica a bit more risky. - gabapentin (NEURONTIN) 800 MG tablet; Take 1 tablet by mouth nightly for 30 days. Dispense: 30 tablet; Refill: 1    Diabetes mellitus type 2 in obese ([facility] [address]  -we will get hemoglobin A1c today. Patient also due for panel  - POCT glycosylated hemoglobin (Hb A1C)      Additional plan and future considerations:       Return to Office: Return in about 4 weeks (around 5/11/2021) for DM, insomnia.     Davon Soto DO   Case discussed with Dr. Moise Ruiz

## 2021-04-13 NOTE — PROGRESS NOTES
pertinent history and exam findings with the resident. I agree with the documented assessment and plan.

## 2021-05-10 NOTE — TELEPHONE ENCOUNTER
Last Appointment:  4/13/2021  Future Appointments   Date Time Provider Alex Anders   5/21/2021 10:00 AM DO Jennifer Sandoval [facility]    Patient currently out of medications.

## 2021-05-10 NOTE — TELEPHONE ENCOUNTER
Last Appointment:  4/13/2021  Future Appointments   Date Time Provider Alex Anders   5/21/2021 10:00 AM Oneil Lundborg, DO [facility]

## 2021-06-04 NOTE — PROGRESS NOTES
Medicare Annual Wellness Visit  Name: Cameron Odonnell Date: 2021   MRN: [id number] Sex: Male   Age: 61 y.o. Ethnicity: Non-/Non    : 1960 Race: Ericka Lees is here for Medicare AWV    Screenings for behavioral, psychosocial and functional/safety risks, and cognitive dysfunction are all negative except as indicated below. These results, as well as other patient data from the [address] form, are documented in Flowsheets linked to this Encounter. Allergies   Allergen Reactions    Amoxicillin Hives and Shortness Of Breath    Penicillins Hives and Shortness Of Breath     TRIAMOX         Prior to Visit Medications    Medication Sig Taking? Authorizing Provider   gabapentin (NEURONTIN) 800 MG tablet Take 1 tablet by mouth nightly for 30 days. Yes Bridget A Sangita, DO   blood glucose monitor strips Test 3 times a day & as needed for symptoms of irregular blood glucose. Dispense sufficient amount for indicated testing frequency plus additional to accommodate PRN testing needs.  Yes Bridget A Rech, DO   albuterol (PROVENTIL) (2.5 MG/3ML) 0.083% nebulizer solution Take 3 mLs by nebulization 4 times daily inhale contents of 1 vial in nebulizer four times a day Yes Bridget A Rech, DO   insulin aspart (NOVOLOG PENFILL) 100 UNIT/ML injection cartridge Inject 20 Units into the skin daily Yes Bridget A Rech, DO   insulin 70-30 (NOVOLIN 70/30) (70-30) 100 UNIT per ML injection vial Inject 55 Units into the skin 2 times daily Yes Bridget A Rech, DO   atorvastatin (LIPITOR) 80 MG tablet take 1 tablet by mouth once daily Yes Bridget A Rech, DO   finasteride (PROSCAR) 5 MG tablet Take 1 tablet by mouth daily Yes Bridget A Rech, DO   B-D INS SYR ULTRAFINE 1CC/30G 30G X 1/2\" 1 ML MISC 1 each by Does not apply route 3 times daily Yes Bridget A Rech, DO   amLODIPine (NORVASC) 10 MG tablet take 1 tablet by mouth once daily Yes Bridget A Rech, DO   omeprazole (PRILOSEC) 20 MG delayed release capsule glucose monitoring kit (FREESTYLE) monitoring kit 1 kit by Does not apply route daily  Obey Perales MD         Past Medical History:   Diagnosis Date    Anxiety     Asthma     CAD (coronary artery disease) 2008    2 stents in 2008 .  CHF (congestive heart failure) (HCC)     Chills     Chronic sinusitis     Cocaine abuse ([facility] [address]     COPD (chronic obstructive pulmonary disease) (HCC)     Depression     Diabetes mellitus (HCC)     on insulin    GERD (gastroesophageal reflux disease)     H/O cardiovascular stress test     Hand fracture 3/2010    Fractured both hands.  Hepatitis C     Work up was negative. Saw specialist. Negative viral load.  History of tobacco abuse     Hyperlipidemia     Hypertension     Night sweats     NSTEMI (non-ST elevation myocardial infarction) ([facility] [address] 3/2010    Hospitalized.  Obesity     Oxygen dependent     Panic attacks     Pneumonia 3/2010     LLL.  Sleep apnea     Has CPAP machine    SOB (shortness of breath)     does not know settings       Past Surgical History:   Procedure Laterality Date    COLONOSCOPY  2011    COLONOSCOPY  9/23/15   [facility] DIAGNOSTIC CARDIAC CATH LAB PROCEDURE  2/25/2009    SE, cardiac cath/primary BM stent in proximal LAD.     OTHER SURGICAL HISTORY      anal fistula    TONSILLECTOMY           Family History   Problem Relation Age of Onset    Heart Failure Father     Diabetes Mother     Heart Surgery Brother        CareTeam (Including outside providers/suppliers regularly involved in providing care):   Patient Care Team:  Dragan Cleary DO as PCP - Romi Schneider MD as Consulting Physician (Cardiology)  Dre Terrell as Imaging Navigator    Wt Readings from Last 3 Encounters:   06/04/21 (!) 380 lb (172.4 kg)   04/13/21 (!) 383 lb (173.7 kg)   03/29/21 (!) 381 lb 9.6 oz (173.1 kg)     Vitals:    06/04/21 1533   Pulse: 76   Temp: 97.2 °F (36.2 °C)   TempSrc: Temporal   SpO2: 94%   Weight: (!) 380 lb (172.4 kg) Height: 6' (1.829 m)     Body mass index is 51.54 kg/m². Based upon direct observation of the patient, evaluation of cognition reveals recent and remote memory intact. General Appearance: alert and oriented to person, place and time, in no acute distress; morbidly obese  Head: normocephalic and atraumatic  Eyes: extraocular eye movements intact and conjunctivae normal  Pulmonary/Chest: clear to auscultation bilaterally- no wheezes, rales or rhonchi, normal air movement, no respiratory distress  Cardiovascular: normal rate, regular rhythm, normal S1 and S2, no murmurs and no gallops  Abdomen: soft, non-tender, non-distended, normal bowel sounds, no masses or organomegaly  Extremities: no clubbing or edema  Musculoskeletal: normal range of motion, no joint swelling, deformity or tenderness  Neurologic: gait and coordination normal and speech normal    Patient's complete Health Risk Assessment and screening values have been reviewed and are found in Flowsheets. The following problems were reviewed today and where indicated follow up appointments were made and/or referrals ordered. Positive Risk Factor Screenings with Interventions:     Fall Risk:  Timed Up and Go Test > 12 seconds? (Complete if either Fall Risk answers are Yes): (!) yes  2 or more falls in past year?: no  Fall with injury in past year?: no  Fall Risk Interventions:    · denies falls in past year          General Health and ACP:  General  In general, how would you say your health is?: (!) Poor  In the past 7 days, have you experienced any of the following?  New or Increased Pain, New or Increased Fatigue, Loneliness, Social Isolation, Stress or Anger?: (!) New or Increased Fatigue  Do you get the social and emotional support that you need?: (!) No  Do you have a Living Will?: (!) No  Advance Directives     Power of  Living Will ACP-Advance Directive ACP-Power of     Not on File Filed on 10/05/13 Filed Not on File      General Health Risk Interventions:  · Poor self-assessment of health status: patient does not exercise a lot, feels lack of motivation as opposed to true fatigued. Discussed trying to increase walking. Doesn't want to get up and go. Loss of interest. Not interested in counseling. Will give documents. Health Habits/Nutrition:  Health Habits/Nutrition  Do you exercise for at least 20 minutes 2-3 times per week?: (!) No  Have you lost any weight without trying in the past 3 months?: No  Do you eat only one meal per day?: No  Have you seen the dentist within the past year?: (!) No  Body mass index: (!) 51.53  Health Habits/Nutrition Interventions:  · Inadequate physical activity:  patient is not ready to increase his/her physical activity level at this time  · Dental exam overdue:  patient reports he wears dentures, and will brush gums on occasion. Declines dental referral.     Safety:  Safety  Do you have working smoke detectors?: Yes  Have all throw rugs been removed or fastened?: (!) No  Do you have non-slip mats or surfaces in all bathtubs/showers?: Yes  Do all of your stairways have a railing or banister?: Yes  Are your doorways, halls and stairs free of clutter?: Yes  Do you always fasten your seatbelt when you are in a car?: Yes  Safety Interventions:  · Home safety tips provided   · One rug, covers whole floor.  Low tripping risk     Personalized Preventive Plan   Current Health Maintenance Status  Immunization History   Administered Date(s) Administered    COVID-19, Pfizer, PF, 30mcg/0.3mL 03/05/2021, 04/01/2021    Hepatitis A 11/04/2013    Hepatitis B (Recombivax HB) 11/04/2013    Influenza 10/16/2012    Influenza Vaccine, unspecified formulation 09/01/2016, 08/17/2018    Influenza Virus Vaccine 10/04/2013, 09/04/2014, 08/20/2015, 08/03/2018    Influenza Whole 09/04/2014    Influenza, High Dose (Fluzone 65 yrs and older) 08/08/2017    Influenza, Quadv, IM, PF (6 mo and older Fluzone, Flulaval, Fluarix, and 3 yrs and older Afluria) 08/20/2015, 08/08/2017, 08/17/2018, 09/08/2019    Pneumococcal Polysaccharide ([id number]) 08/12/2015    Tdap (Boostrix, Adacel) 05/07/2015        Health Maintenance   Topic Date Due    Shingles Vaccine (1 of 2) Never done   [employer] Visit (AWV)  Never done    Lipid screen  01/09/2021    Low dose CT lung screening  07/06/2021    Diabetic retinal exam  08/19/2021    A1C test (Diabetic or Prediabetic)  11/24/2021    Potassium monitoring  01/08/2022    Creatinine monitoring  01/08/2022    Diabetic foot exam  03/01/2022    DTaP/Tdap/Td vaccine (2 - Td or Tdap) 05/07/2025    Pneumococcal 0-64 years Vaccine (2 of 2) 09/18/2025    Colon cancer screen colonoscopy  09/23/2025    Flu vaccine  Completed    COVID-19 Vaccine  Completed    Hepatitis C screen  Completed    HIV screen  Completed    Hepatitis A vaccine  Aged Out    Hib vaccine  Aged Out    Meningococcal (ACWY) vaccine  Aged Out     Recommendations for [employer] Due: see orders and patient instructions/AVS.  . Recommended screening schedule for the next 5-10 years is provided to the patient in written form: see Patient Instructions/AVS.  Patient needs medication refills today for chronic conditions. Tex Feliz was seen today for medicare awv. Diagnoses and all orders for this visit:    Neuropathy  -     gabapentin (NEURONTIN) 800 MG tablet; Take 1 tablet by mouth nightly for 30 days. Diabetes mellitus type 2 in obese (HCC)  -     blood glucose monitor strips; Test 3 times a day & as needed for symptoms of irregular blood glucose. Dispense sufficient amount for indicated testing frequency plus additional to accommodate PRN testing needs. -     insulin aspart (NOVOLOG PENFILL) 100 UNIT/ML injection cartridge;  Inject 20 Units into the skin daily  -     insulin 70-30 (NOVOLIN 70/30) (70-30) 100 UNIT per ML injection vial; Inject 55 Units into the skin 2 times daily  -     atorvastatin (LIPITOR) 80 MG tablet; take 1 tablet by mouth once daily  -     B-D INS SYR ULTRAFINE 1CC/30G 30G X 1/2\" 1 ML MISC; 1 each by Does not apply route 3 times daily  -     metFORMIN (GLUCOPHAGE) 1000 MG tablet; take 1 tablet by mouth twice a day with meals  -     Lancets MISC; Check sugars as directed. Bacilio Bowie for pharmacy to substitute  -     Insulin Syringes, Disposable, U-100 1 ML MISC; 1 each by Does not apply route daily    Pulmonary emphysema, unspecified emphysema type (HCC)  -     albuterol (PROVENTIL) (2.5 MG/3ML) 0.083% nebulizer solution; Take 3 mLs by nebulization 4 times daily inhale contents of 1 vial in nebulizer four times a day    Medication refill  -     finasteride (PROSCAR) 5 MG tablet; Take 1 tablet by mouth daily  -     amLODIPine (NORVASC) 10 MG tablet; take 1 tablet by mouth once daily  -     omeprazole (PRILOSEC) 20 MG delayed release capsule; Take 1 capsule by mouth daily  -     atenolol (TENORMIN) 50 MG tablet; take 1 tablet by mouth twice a day  -     hydroCHLOROthiazide (HYDRODIURIL) 25 MG tablet; take 1 tablet by mouth once daily  -     tamsulosin (FLOMAX) 0.4 MG capsule; take 1 capsule by mouth once daily    Benign prostatic hyperplasia with urinary frequency  -     tamsulosin (FLOMAX) 0.4 MG capsule; take 1 capsule by mouth once daily    Other insomnia  -     traZODone (DESYREL) 100 MG tablet;  Take 4 tablets by mouth nightly

## 2021-06-04 NOTE — PROGRESS NOTES
17-year-old male presents for annual well care visit. Patient does report lack of motivation wanting to do things, is not interested in psych medications for depression or in counseling. Patient does not exercise much, have discussed this with him before including walking with his dog more. Patient does say he plans on going fishing with a friend soon. Patient does not see dentist, has dentures. He reports he usually uses mouthwash, occasionally brushes his gums. Patient denies any falls in the past year. He reports that he has 1 rug in the house but is large enough to fill the whole room. He believes it is low risk for tripping. Patient does request a refill of all medications today. We have discussed with patient in the past that he uses a lot of strong medications to help him sleep. He has stopped using over-the-counter cold medicine to help him sleep. He still uses 400 mg of trazodone at night, 800 mg of gabapentin for diabetic neuropathy at night, and mirtazapine 30 mg at night. No other concerns at this time. Did discuss possibility of having patient go to podiatry for regular foot care due to diabetes. He states that he will consider doing it on his own, does not want a referral at this time. Return to office in 1 to 2 months for regular chronic problems    Attending Physician Statement  I have discussed the case, including pertinent history and exam findings with the resident. I agree with the documented assessment and plan.

## 2021-07-09 NOTE — TELEPHONE ENCOUNTER
Last Appointment:  6/4/2021  Future Appointments   Date Time Provider Alex Anders   8/6/2021  1:20 PM Isla Iba, DO Nestora Sacks [facility]

## 2021-08-02 PROBLEM — R06.02 SHORTNESS OF BREATH: Status: ACTIVE | Noted: 2021-01-01

## 2021-08-02 PROBLEM — S91.319A FOOT LACERATION: Status: ACTIVE | Noted: 2021-01-01

## 2021-08-02 PROBLEM — J18.9 PNEUMONIA: Status: ACTIVE | Noted: 2021-01-01

## 2021-08-02 NOTE — ED NOTES
Radiology Procedure Waiver   Name: Iveth Maxwell  : 1960  MRN: [id number]    Date:  21    Time: 5:07 PM EDT    Benefits of immediately proceeding with Radiology exam(s) without pre-testing outweigh the risks or are not indicated as specified below and therefore the following is/are being waived:    [] Pregnancy test   [] Patients LMP on-time and regular.   [] Patient had Tubal Ligation or has other Contraception Device. [] Patient  is Menopausal or Premenarcheal.    [] Patient had Full or Partial Hysterectomy. [] Protocol for Iodine allergy    [] MRI Questionnaire     [x] BUN/Creatinine   [x] Patient age w/no hx of renal dysfunction. [] Patient on Dialysis. [] Recent Normal Labs. Electronically signed by Brittany Iniguez DO on 21 at 5:07 PM EDT          Strong concern for pathology needing CTA of the chest.  Will have fluid bolus and maintenance fluids during admission to prevent any contrast associated nephritis.      Brittany Iniguez DO  [phone]

## 2021-08-02 NOTE — ED PROVIDER NOTES
Chief Complaint   Patient presents with    Loss of Consciousness     was in line at the store and felt short of breath and states he passed out this past saturday. 92% on 6L in triage. Wears 6L at home        Patient 78-year-old male who wears oxygen at baseline presents today for worsening fatigue, shortness of breath and syncope. Had a syncopal episode over the weekend. States he is more short of breath than usual.  Has difficulty ambulating throughout his home without being short of breath even on his home oxygen. Denies history of blood clots in his legs or lungs before. He is not on blood thinning medications. Did have a syncopal episode over the weekend. He did fall and is complaining of right foot pain. On arrival he is alert and oriented, appears uncomfortable. Denies cough, fevers or chills. No history of Covid. The history is provided by the patient. No  was used. Review of Systems   Constitutional: Positive for fatigue. Negative for chills and fever. HENT: Negative for ear pain, sinus pressure and sore throat. Eyes: Negative for pain, discharge and redness. Respiratory: Positive for shortness of breath. Negative for cough and wheezing. Cardiovascular: Negative for chest pain. Gastrointestinal: Negative for abdominal pain, diarrhea, nausea and vomiting. Genitourinary: Negative for dysuria and frequency. Musculoskeletal: Negative for arthralgias and back pain. Skin: Positive for wound. Negative for rash. Neurological: Negative for weakness and headaches. Hematological: Negative for adenopathy. Psychiatric/Behavioral: Negative for behavioral problems and confusion. All other systems reviewed and are negative. Physical Exam  Vitals and nursing note reviewed. Constitutional:       Appearance: Normal appearance. He is well-developed. He is obese. He is ill-appearing. HENT:      Head: Normocephalic and atraumatic.       Mouth/Throat: Mouth: Mucous membranes are moist.   Eyes:      Pupils: Pupils are equal, round, and reactive to light. Cardiovascular:      Rate and Rhythm: Normal rate and regular rhythm. Heart sounds: Normal heart sounds. No murmur heard. Pulmonary:      Effort: Pulmonary effort is normal. No respiratory distress. Breath sounds: Normal breath sounds. No wheezing or rales. Abdominal:      General: Bowel sounds are normal.      Palpations: Abdomen is soft. Tenderness: There is no abdominal tenderness. There is no guarding or rebound. Musculoskeletal:         General: Tenderness present. No signs of injury. Normal range of motion. Cervical back: Normal range of motion and neck supple. Skin:     General: Skin is warm and dry. Capillary Refill: Capillary refill takes less than 2 seconds. Findings: Bruising (R toes) present. Neurological:      General: No focal deficit present. Mental Status: He is alert and oriented to person, place, and time. Cranial Nerves: No cranial nerve deficit.       Coordination: Coordination normal.   Psychiatric:         Mood and Affect: Mood normal.          Procedures     Labs Reviewed   CBC WITH AUTO DIFFERENTIAL - Abnormal; Notable for the following components:       Result Value    MCHC 30.2 (*)     RDW 15.3 (*)     Lymphocytes % 16.1 (*)     Neutrophils Absolute 7.97 (*)     All other components within normal limits   COMPREHENSIVE METABOLIC PANEL - Abnormal; Notable for the following components:    Glucose 120 (*)     BUN 26 (*)     CREATININE 2.1 (*)     Total Protein 6.3 (*)     All other components within normal limits   LACTIC ACID, PLASMA - Abnormal; Notable for the following components:    Lactic Acid 4.1 (*)     All other components within normal limits    Narrative:     CALL  [place]  H34 tel. ,  Chemistry results called to and read back by Erwin Suazo RN, 08/02/2021  16:48, by Cyn Craft   TROPONIN - Abnormal; Notable for the following Fleischner society guidelines. XR FOOT RIGHT (MIN 3 VIEWS)   Final Result   Nonspecific edema of the foot. No acute fractures or dislocations in the right foot. XR CHEST (2 VW)    (Results Pending)     EKG #1:  I personally interpreted this EKG  Time:  1410    Rate: 71  Rhythm: Sinus. Interpretation: Sinus rhythm with first-degree AV block, left axis deviation, right bundle branch block, unchanged from previous. MDM  Number of Diagnoses or Management Options  Pneumonia due to infectious organism, unspecified laterality, unspecified part of lung  Syncope and collapse  Diagnosis management comments: Patient is a 70-year-old male who wears oxygen at baseline presents today for shortness of breath and worsening dyspnea on exertion. Lung sounds are diminished throughout. He is hypoxic on his home oxygen. Tachycardic on arrival.  There is initial concern for PE, therefore CTA was obtained. CTA shows no evidence of pulmonary embolism, they do no cardiomegaly and moderate pleural effusion with infiltrates. They also note pulmonary nodules. Covid test was negative. Patient does have elevated proBNP of 4152. EKG did not show any ischemic changes. Patient has elevated lactic acid, cough, infiltrates on imaging. Patient was started on antibiotics for pneumonia. He will be admitted to the hospital as he did have syncopal episode with associated pneumonia and hypoxia. PCP was consulted who agrees to admit.        Amount and/or Complexity of Data Reviewed  Clinical lab tests: reviewed  Tests in the radiology section of CPT®: reviewed                --------------------------------------------- PAST HISTORY ---------------------------------------------  Past Medical History:  has a past medical history of Anxiety, Asthma, CAD (coronary artery disease), CHF (congestive heart failure) ([facility] [address] Chills, Chronic sinusitis, [facility] COPD (chronic obstructive pulmonary disease) ([facility] Depression, Diabetes mellitus ([facility] GERD (gastroesophageal reflux disease), H/O cardiovascular stress test, Hand fracture, Hepatitis C, History of tobacco abuse, Hyperlipidemia, Hypertension, Night sweats, NSTEMI (non-ST elevation myocardial infarction) ([facility] Obesity, Oxygen dependent, Panic attacks, Pneumonia, Sleep apnea, and SOB (shortness of breath). Past Surgical History:  has a past surgical history that includes other surgical history; Tonsillectomy; Diagnostic Cardiac Cath Lab Procedure (2/25/2009); Colonoscopy (2011); and Colonoscopy (9/23/15). Social History:  reports that he quit smoking about 13 years ago. His smoking use included cigarettes. He started smoking about 41 years ago. He has a 76.00 pack-year smoking history. He has never used smokeless tobacco. He reports that he does not drink alcohol and does not use drugs. Family History: family history includes Diabetes in his mother; Heart Failure in his father; Heart Surgery in his brother. The patients home medications have been reviewed.     Allergies: Amoxicillin and Penicillins    -------------------------------------------------- RESULTS -------------------------------------------------    LABS:  Results for orders placed or performed during the hospital encounter of 08/02/21   COVID-19, Rapid    Specimen: Nasopharyngeal Swab   Result Value Ref Range    SARS-CoV-2, NAAT Not Detected Not Detected   Respiratory Panel, Molecular, with COVID-19 (Restricted: peds pts or suitable admitted adults)    Specimen: Nasopharyngeal   Result Value Ref Range    Adenovirus by PCR Not Detected Not Detected    Bordetella parapertussis by PCR Not Detected Not Detected    Bordetella pertussis by PCR Not Detected Not Detected    Chlamydophilia pneumoniae by PCR Not Detected Not Detected    Coronavirus 229E by PCR Not Detected Not Detected    Coronavirus HKU1 by PCR Not Detected Not Detected    Coronavirus NL63 by PCR Not Detected Not Detected Coronavirus OC43 by PCR Not Detected Not Detected    SARS-CoV-2, PCR Not Detected Not Detected    Human Metapneumovirus by PCR Not Detected Not Detected    Human Rhinovirus/Enterovirus by PCR Not Detected Not Detected    Influenza A by PCR Not Detected Not Detected    Influenza B by PCR Not Detected Not Detected    Mycoplasma pneumoniae by PCR Not Detected Not Detected    Parainfluenza Virus 1 by PCR Not Detected Not Detected    Parainfluenza Virus 2 by PCR Not Detected Not Detected    Parainfluenza Virus 3 by PCR Not Detected Not Detected    Parainfluenza Virus 4 by PCR Not Detected Not Detected    Respiratory Syncytial Virus by PCR Not Detected Not Detected   CBC auto differential   Result Value Ref Range    WBC 10.6 4.5 - 11.5 E9/L    RBC 5.13 3.80 - 5.80 E12/L    Hemoglobin 14.0 12.5 - 16.5 g/dL    Hematocrit 46.4 37.0 - 54.0 %    MCV 90.4 80.0 - 99.9 fL    MCH 27.3 26.0 - 35.0 pg    MCHC 30.2 (L) 32.0 - 34.5 %    RDW 15.3 (H) 11.5 - 15.0 fL    Platelets 263 486 - 680 E9/L    MPV 11.8 7.0 - 12.0 fL    Neutrophils % 74.9 43.0 - 80.0 %    Immature Granulocytes % 0.6 0.0 - 5.0 %    Lymphocytes % 16.1 (L) 20.0 - 42.0 %    Monocytes % 7.1 2.0 - 12.0 %    Eosinophils % 0.8 0.0 - 6.0 %    Basophils % 0.5 0.0 - 2.0 %    Neutrophils Absolute 7.97 (H) 1.80 - 7.30 E9/L    Immature Granulocytes # 0.06 E9/L    Lymphocytes Absolute 1.71 1.50 - 4.00 E9/L    Monocytes Absolute 0.76 0.10 - 0.95 E9/L    Eosinophils Absolute 0.08 0.05 - 0.50 E9/L    Basophils Absolute 0.05 0.00 - 0.20 E9/L   Comprehensive Metabolic Panel   Result Value Ref Range    Sodium 142 132 - 146 mmol/L    Potassium 5.0 3.5 - 5.0 mmol/L    Chloride 106 98 - 107 mmol/L    CO2 26 22 - 29 mmol/L    Anion Gap 10 7 - 16 mmol/L    Glucose 120 (H) 74 - 99 mg/dL    BUN 26 (H) 6 - 23 mg/dL    CREATININE 2.1 (H) 0.7 - 1.2 mg/dL    GFR Non-African American 32 >=60 mL/min/1.73    GFR African American 39     Calcium 9.2 8.6 - 10.2 mg/dL    Total Protein 6.3 (L) 6.4 - 8.3 g/dL    Albumin 3.6 3.5 - 5.2 g/dL    Total Bilirubin 0.3 0.0 - 1.2 mg/dL    Alkaline Phosphatase 43 40 - 129 U/L    ALT 20 0 - 40 U/L    AST 17 0 - 39 U/L   Lactic Acid, Plasma   Result Value Ref Range    Lactic Acid 4.1 (HH) 0.5 - 2.2 mmol/L   Troponin   Result Value Ref Range    Troponin, High Sensitivity 24 (H) 0 - 11 ng/L   Brain Natriuretic Peptide   Result Value Ref Range    Pro-BNP 4,152 (H) 0 - 125 pg/mL   Urinalysis   Result Value Ref Range    Color, UA Yellow Straw/Yellow    Clarity, UA Clear Clear    Glucose, Ur Negative Negative mg/dL    Bilirubin Urine Negative Negative    Ketones, Urine Negative Negative mg/dL    Specific Gravity, UA 1.025 1.005 - 1.030    Blood, Urine Negative Negative    pH, UA 5.0 5.0 - 9.0    Protein, UA TRACE Negative mg/dL    Urobilinogen, Urine 0.2 <2.0 E.U./dL    Nitrite, Urine Negative Negative    Leukocyte Esterase, Urine Negative Negative   Troponin   Result Value Ref Range    Troponin, High Sensitivity 25 (H) 0 - 11 ng/L   Procalcitonin   Result Value Ref Range    Procalcitonin 0.10 (H) 0.00 - 0.08 ng/mL   LACTIC ACID, PLASMA   Result Value Ref Range    Lactic Acid 1.5 0.5 - 2.2 mmol/L   Hemoglobin A1c   Result Value Ref Range    Hemoglobin A1C 6.3 (H) 4.0 - 5.6 %   Microscopic Urinalysis   Result Value Ref Range    WBC, UA 0-1 0 - 5 /HPF    RBC, UA 0-1 0 - 2 /HPF    Bacteria, UA NONE SEEN None Seen /HPF   Basic metabolic panel   Result Value Ref Range    Sodium 141 132 - 146 mmol/L    Potassium 5.2 (H) 3.5 - 5.0 mmol/L    Chloride 104 98 - 107 mmol/L    CO2 29 22 - 29 mmol/L    Anion Gap 8 7 - 16 mmol/L    Glucose 139 (H) 74 - 99 mg/dL    BUN 22 6 - 23 mg/dL    CREATININE 1.7 (H) 0.7 - 1.2 mg/dL    GFR Non-African American 41 >=60 mL/min/1.73    GFR African American 50     Calcium 9.6 8.6 - 10.2 mg/dL   POCT glucose   Result Value Ref Range    Glucose 138 mg/dL    QC OK?  yes    POCT Glucose   Result Value Ref Range    Meter Glucose 138 (H) 74 - 99 mg/dL   POCT Glucose Result Value Ref Range    Meter Glucose 120 (H) 74 - 99 mg/dL   POCT Glucose   Result Value Ref Range    Meter Glucose 180 (H) 74 - 99 mg/dL   EKG 12 Lead   Result Value Ref Range    Ventricular Rate 71 BPM    Atrial Rate 71 BPM    P-R Interval 356 ms    QRS Duration 138 ms    Q-T Interval 440 ms    QTc Calculation (Bazett) 478 ms    P Axis 39 degrees    R Axis -49 degrees    T Axis 150 degrees   EKG 12 Lead   Result Value Ref Range    Ventricular Rate 60 BPM    Atrial Rate 60 BPM    P-R Interval 332 ms    QRS Duration 136 ms    Q-T Interval 474 ms    QTc Calculation (Bazett) 474 ms    P Axis 12 degrees    R Axis -51 degrees    T Axis 148 degrees       RADIOLOGY:  CT HEAD WO CONTRAST   Final Result   No skull fracture or acute intracranial abnormality. CTA PULMONARY W CONTRAST   Final Result   No central pulmonary embolism or aortic dissection. The distal subsegmental   and peripheral vessels are poorly evaluated due to poor contrast.      Cardiomegaly with moderate pleural effusion with atelectasis likely CHF and   or pneumonia. 2-4 mm nonspecific pulmonary nodules. Consider surveillance according to   Fleischner society guidelines. XR FOOT RIGHT (MIN 3 VIEWS)   Final Result   Nonspecific edema of the foot. No acute fractures or dislocations in the right foot. XR CHEST (2 VW)    (Results Pending)           ------------------------- NURSING NOTES AND VITALS REVIEWED ---------------------------  Date / Time Roomed:  8/2/2021  1:58 PM  ED Bed Assignment:  [id number]    The nursing notes within the ED encounter and vital signs as below have been reviewed.      Patient Vitals for the past 24 hrs:   BP Temp Temp src Pulse Resp SpO2 Height Weight   08/03/21 0807 -- -- -- -- 18 96 % -- --   08/03/21 0645 (!) 151/84 97.5 °F (36.4 °C) Temporal 64 20 96 % -- --   08/03/21 0602 (!) 142/87 97.2 °F (36.2 °C) Infrared 62 20 95 % -- --   08/03/21 0311 (!) 166/94 97.3 °F (36.3 °C) -- 59 22 97 % -- --   08/02/21 2302 (!) 151/97 98 °F (36.7 °C) -- 60 18 98 % -- --   08/02/21 2123 (!) 151/99 97.3 °F (36.3 °C) -- 59 19 99 % -- --   08/02/21 1921 (!) 156/95 97.2 °F (36.2 °C) Infrared 61 24 99 % -- --   08/02/21 1853 134/77 97.8 °F (36.6 °C) -- 72 18 98 % -- --   08/02/21 1653 (!) 144/62 97.3 °F (36.3 °C) -- 78 19 98 % -- --   08/02/21 1356 135/86 -- -- -- 22 -- 6' (1.829 m) (!) 360 lb (163.3 kg)   08/02/21 1346 -- 94.8 °F (34.9 °C) -- 71 -- (!) 88 % -- --       Oxygen Saturation Interpretation: Abnormal    ------------------------------------------ PROGRESS NOTES ------------------------------------------    Counseling:  I have spoken with the patient and discussed todays results, in addition to providing specific details for the plan of care and counseling regarding the diagnosis and prognosis. Their questions are answered at this time and they are agreeable with the plan of admission.    --------------------------------- ADDITIONAL PROVIDER NOTES ---------------------------------  Consultations:  Spoke with On Call. Discussed case. They will admit the patient. This patient's ED course included: a personal history and physicial examination    This patient has remained hemodynamically stable during their ED course.       Medications   perflutren lipid microspheres (DEFINITY) injection 1.65 mg (has no administration in time range)   amLODIPine (NORVASC) tablet 10 mg (10 mg Oral Given 8/3/21 0906)   aspirin EC tablet 81 mg (81 mg Oral Given 8/3/21 0906)   atenolol (TENORMIN) tablet 50 mg (has no administration in time range)   atorvastatin (LIPITOR) tablet 80 mg (has no administration in time range)   budesonide (PULMICORT) nebulizer suspension 500 mcg (500 mcg Nebulization Given 8/3/21 0807)   mirtazapine (REMERON) tablet 30 mg (has no administration in time range)   pantoprazole (PROTONIX) tablet 40 mg (40 mg Oral Given 8/3/21 0906)   traZODone (DESYREL) tablet 400 mg (has no administration in time range) sodium chloride flush 0.9 % injection 5-40 mL (10 mLs Intravenous Given 8/3/21 0910)   sodium chloride flush 0.9 % injection 5-40 mL (has no administration in time range)   0.9 % sodium chloride infusion (has no administration in time range)   enoxaparin (LOVENOX) injection 40 mg (40 mg Subcutaneous Given 8/3/21 0906)   ondansetron (ZOFRAN-ODT) disintegrating tablet 4 mg (has no administration in time range)     Or   ondansetron (ZOFRAN) injection 4 mg (has no administration in time range)   polyethylene glycol (GLYCOLAX) packet 17 g (has no administration in time range)   acetaminophen (TYLENOL) tablet 650 mg (has no administration in time range)     Or   acetaminophen (TYLENOL) suppository 650 mg (has no administration in time range)   insulin lispro (HUMALOG) injection vial 0-18 Units (0 Units Subcutaneous Not Given 8/3/21 0856)   insulin lispro (HUMALOG) injection vial 0-9 Units (has no administration in time range)   ipratropium-albuterol (DUONEB) nebulizer solution 1 ampule (1 ampule Inhalation Given 8/3/21 0807)   hydrALAZINE (APRESOLINE) tablet 25 mg (has no administration in time range)   glucose (GLUTOSE) 40 % oral gel 15 g (has no administration in time range)   dextrose 50 % IV solution (has no administration in time range)   glucagon (rDNA) injection 1 mg (has no administration in time range)   dextrose 5 % solution (has no administration in time range)   insulin glargine (LANTUS) injection vial 41 Units (41 Units Subcutaneous Not Given 8/3/21 0312)   insulin lispro (HUMALOG) injection vial 8 Units (8 Units Subcutaneous Given 8/3/21 0907)   furosemide (LASIX) injection 20 mg (has no administration in time range)   0.9 % sodium chloride bolus (0 mLs Intravenous Stopped 8/2/21 1831)   iopamidol (ISOVUE-370) 76 % injection 60 mL (60 mLs Intravenous Given 8/2/21 1726)   levoFLOXacin (LEVAQUIN) 750 MG/150ML infusion 750 mg (0 mg Intravenous Stopped 8/2/21 2303)         Diagnosis:  1.  Syncope and collapse 2. Pneumonia due to infectious organism, unspecified laterality, unspecified part of lung        Disposition:  Patient's disposition: Admit to telemetry  Patient's condition is stable.              Roque Ruvalcaba DO  Resident  08/03/21 1027

## 2021-08-02 NOTE — TELEPHONE ENCOUNTER
Patient called in, states that he fell Saturday outside of [address] [place] [address], became very SOB, has a foot wound. Patient is scheduled  EMS and the Fire department was called to assist the patient to his chair, her refused transfer to the hospital at that time because his dog was in the car and he wanted to take him home. He has an appointment for his CT Lung screening and wants someone to call the hospital to have someone there look at his foot. I explained that he would need to go to the ER if he wanted evaluated in the hospital. There are no same day appointment available. Advised ER, Patient verbalized understanding.

## 2021-08-02 NOTE — ED NOTES
This rn called ct x3 with no answer.  This rn is unable to take pt to ct at this time       Lois Mora RN  08/02/21 8450

## 2021-08-02 NOTE — ED NOTES
This rn took over care at [phone]. Was told by previous rn savannah pt has been in xray and ct and therefore blood/covid was not obtained. Pt still not in room.  This rn will obtain diagnostic testing when patient returns       Shea Stiles RN  08/02/21 2428

## 2021-08-03 PROBLEM — R09.02 HYPOXIA: Status: ACTIVE | Noted: 2021-01-01

## 2021-08-03 NOTE — CARE COORDINATION
Met with pt to discuss discharge planning/transition of care. Pt lives with a roommate in a 2 story home. Pt's bedroom and bathroom are on the second floor. Pt states that he ambulates independently, but would benefit from a rollator. Will get therapy evals for pt for recommendations. Pt does have home O2 and bipap through [facility]( home). Plan is home at discharge, roommate to transport home. [facility] is where his attending is and [facility] on [address] is his pharmacy. Pt currently on 15LO2 nc, IV levaquin, and pulmonary and podiatry consulted. Tay Holland, MSW, LSW

## 2021-08-03 NOTE — CONSULTS
Carlene Fox M.D.,[facility]  Elma Martínez D.O., F.BART.JOHNNIE.O.LELE., Farhan Martin M.D. Ana Lilia Pérez M.D. Shasta Vergara D.O. Patient:  Marci Coates 61 y.o. male MRN: [id number]     Date of Service: 8/3/2021      PULMONARY CONSULTATION    Reason for Consultation: history or COPD, home O2  Referring Physician: Prasad Cabrera    Communication with the referring physician will be sent via the electronic medical record. Chief Complaint: SOB    CODE STATUS: FULL    SUBJECTIVE:  HPI:  Marci Coates is a 61 y.o.  male that is known to our office and follows with Dr. Natalie Christie. He has a medical history of anxiety, sleep apnea, coronary artery disease with 2 stents, CHF, COPD, asthma, diabetes, depression, cocaine abuse, GERD, hepatitis C. He uses a home BiPAP machine and 3 to 6 L nasal cannula chronically. He was on Qvar due to financial reasons of not being able to afford Trelegy. He was also taking Combivent and Albuterol. Patient was at the hospital 8/2 for his annual CT scan lung screening. He decided since he was already here to go over to the ER because he has been feeling short of breath and had a cut on his foot. He admits to having loss of consciousness Saturday 7/31 while at the grocery store. He was waiting in line when he got dizzy and lightheaded so he went to his car to get his oxygen tank. He admits to having a syncopal event but notes when he came to he was sitting up and does not think he hit his head. He felt better once having his oxygen. He received 1 dose of Levaquin in the ER for possible pneumonia. He received Lasix 20 mg IV x1. Admission labs: Pro-BNP 4,152, Procalcitonin 0.10, sodium 142, potassium 5.0, CO2 26, creatinine 2.1, lactic acid 4.1, troponin 24, WBC 10.6. Admission imaging: CT of head without contrast shows no skull fracture or acute intracranial abnormality. CTA of chest with contrast shows no central pulmonary embolism or aortic dissection.   The distal subsegmental and peripheral vessels are poorly evaluated due to poor contrast.  Cardiomegaly with moderate pleural effusion with atelectasis likely CHF and/or pneumonia. 2 -4 mm nonspecific pulmonary nodules. Patient seen and examined sitting at bedside chair on 15 L high flow nasal cannula attempting to adjust his BiPAP mask. Dr. Kalyani Odom assisted him with this and changed his settings to his home settings of 15/10. He complains of increasing shortness of breath over the past 3 to 4 months. States that by the time he gets from his chair to the kitchen he has to lean over the kitchen sink to catch his breath. Then he can make it to the fridge and back to the kitchen sink and has to rest again. Past Medical History:   Diagnosis Date    Anxiety     Asthma     CAD (coronary artery disease) 2008    2 stents in 2008 .  CH[facility]congestive heart failure) [facility]     Chills     Chronic sinusitis     Cocaine abuse ([address]     COPD (chronic obstructive pulmonary disease) ([facility]     Depression     Diabetes mellitus ([facility])     on insulin    GERD (gastroesophageal reflux disease)     H/O cardiovascular stress test     Hand fracture 3/2010    Fractured both hands.  Hepatitis C     Work up was negative. Saw specialist. Negative viral load.  History of tobacco abuse     Hyperlipidemia     Hypertension     Night sweats     NSTEMI (non-ST elevation myocardial infarction) ([address] 3/2010    Hospitalized.  Obesity     Oxygen dependent     Panic attacks     Pneumonia 3/2010     LLL.  Sleep apnea     Has CPAP machine    SOB (shortness of breath)     does not know settings       Past Surgical History:   Procedure Laterality Date    COLONOSCOPY  2011    COLONOSCOPY  9/23/15   [employer] CATH [employer] PROCEDURE  2/25/2009    SE, cardiac cath/primary BM stent in proximal LAD.     OTHER SURGICAL HISTORY      anal fistula    TONSILLECTOMY         Family History   Problem Relation Age of Onset    Heart Failure Father     Diabetes Mother     Heart Surgery Brother        Social History:   Social History     Socioeconomic History    Marital status: Single     Spouse name: Not on file    Number of children: Not on file    Years of education: 15    Highest education level: Not on file   Occupational History    Occupation: disabled-   Tobacco Use    Smoking status: Former Smoker     Packs/day: 2.00     Years: 38.00     Pack years: 76.00     Types: Cigarettes     Start date:      Quit date: 2008     Years since quittin.0    Smokeless tobacco: Never Used   Vaping Use    Vaping Use: Never used   Substance and Sexual Activity    Alcohol use: No     Alcohol/week: 0.0 standard drinks     Comment: was heavy drinker; quit age 35;  drinks 1-2 cups of coffee daily    Drug use: No     Comment: Acid, marijuana, cocaine, STOPPED ALL DRUGS     Sexual activity: Not Currently   Other Topics Concern    Not on file   Social History Narrative    Not on file     Social Determinants of Health     Financial Resource Strain:     Difficulty of Paying Living Expenses:    Food Insecurity:     Worried About Running Out of Food in the Last Year:     Ran Out of Food in the Last Year:    Transportation Needs:     Lack of Transportation (Medical):      Lack of Transportation (Non-Medical):    Physical Activity:     Days of Exercise per Week:     Minutes of Exercise per Session:    Stress:     Feeling of Stress :    Social Connections:     Frequency of Communication with Friends and Family:     Frequency of Social Gatherings with Friends and Family:     Attends L[employer] day Services:     Active Member of Clubs or Organizations:     Attends Club or Organization Meetings:     Marital Status:    Intimate Partner Violence:     Fear of Current or Ex-Partner:     Emotionally Abused:     Physically Abused:     Sexually Abused:      Smoking history: The patient reports that he quit delayed release capsule, Take 1 capsule by mouth daily  atenolol (TENORMIN) 50 MG tablet, take 1 tablet by mouth twice a day  metFORMIN (GLUCOPHAGE) 1000 MG tablet, take 1 tablet by mouth twice a day with meals  hydroCHLOROthiazide (HYDRODIURIL) 25 MG tablet, take 1 tablet by mouth once daily  tamsulosin (FLOMAX) 0.4 MG capsule, take 1 capsule by mouth once daily  Lancets MISC, Check sugars as directed. Natalia Perkins for pharmacy to substitute  Insulin Syringes, Disposable, U-100 1 ML MISC, 1 each by Does not apply route daily  losartan (COZAAR) 25 MG tablet, take 1 tablet by mouth once daily  Blood Glucose Monitoring Suppl (ONE TOUCH ULTRA 2) w/Device KIT, 1 kit by Does not apply route daily  blood glucose test strips (ASCENSIA AUTODISC VI;ONE TOUCH ULTRA TEST VI) strip, 1 each by Does not apply route 3 times daily  docusate sodium (DOCQLACE) 100 MG capsule, Take 1 capsule by mouth 2 times daily  mirtazapine (REMERON) 30 MG tablet, take 1 tablet by mouth every evening  aspirin 81 MG EC tablet, Take 1 tablet by mouth daily  Omega-3 Fatty Acids (FISH OIL) 1000 MG CAPS, Take 1 capsule by mouth daily  Handicap Placard MISC, by Does not apply route  glucose monitoring kit (FREESTYLE) monitoring kit, 1 kit by Does not apply route daily  beclomethasone (QVAR) 80 MCG/ACT inhaler, Inhale 2 puffs into the lungs 2 times daily  OXYGEN, Inhale 6 L into the lungs continuous   Ipratropium-Albuterol (COMBIVENT IN), Inhale 2 puffs into the lungs 2 times daily   gabapentin (NEURONTIN) 800 MG tablet, Take 1 tablet by mouth nightly for 30 days.   promethazine (PHENERGAN) 25 MG tablet, Take 1 tablet by mouth every 6 hours as needed for Nausea    CURRENT MEDS :  Scheduled Meds:   amLODIPine  10 mg Oral Daily    aspirin  81 mg Oral Daily    atenolol  50 mg Oral Daily    atorvastatin  80 mg Oral Nightly    budesonide  0.5 mg Nebulization BID    mirtazapine  30 mg Oral Nightly    pantoprazole  40 mg Oral QAM AC    traZODone  400 mg Oral Nightly    sodium chloride flush  5-40 mL Intravenous 2 times per day    enoxaparin  40 mg Subcutaneous Daily    insulin lispro  0-18 Units Subcutaneous TID WC    insulin lispro  0-9 Units Subcutaneous Nightly    ipratropium-albuterol  1 ampule Inhalation Q4H WA    insulin glargine  0.25 Units/kg Subcutaneous Nightly    insulin lispro  0.05 Units/kg Subcutaneous TID WC       Continuous Infusions:   sodium chloride      dextrose         Allergies   Allergen Reactions    Amoxicillin Hives and Shortness Of Breath    Penicillins Hives and Shortness Of Breath     TRIAMOX       REVIEW OF SYSTEMS:  Constitutional: Denies fever, weight loss, night sweats, and fatigue  Skin: Denies pigmentation, dark lesions, and rashes   HEENT: Denies hearing loss, tinnitus, ear drainage, epistaxis, sore throat, and hoarseness. Cardiovascular: Denies palpitations, chest pain, and chest pressure. Respiratory: Denies cough, dyspnea at rest, hemoptysis, apnea, and choking.  Positive ALONSO  Gastrointestinal: Denies nausea, vomiting, poor appetite, diarrhea, heartburn or reflux  Genitourinary: Denies dysuria, frequency, urgency or hematuria  Musculoskeletal: Denies myalgias, muscle weakness, and bone pain  Neurological: Denies dizziness, vertigo, headache, and focal weakness  Psychological: Denies anxiety and depression  Endocrine: Denies heat intolerance and cold intolerance  Hematopoietic/Lymphatic: Denies bleeding problems and blood transfusions    OBJECTIVE:   BP (!) 150/80   Pulse 68   Temp 97.6 °F (36.4 °C) (Temporal)   Resp 18   Ht 6' (1.829 m)   Wt (!) 360 lb (163.3 kg)   SpO2 96%   BMI 48.82 kg/m²   SpO2 Readings from Last 1 Encounters:   08/03/21 96%        I/O:    Intake/Output Summary (Last 24 hours) at 8/3/2021 1200  Last data filed at 8/3/2021 0818  Gross per 24 hour   Intake 240 ml   Output --   Net 240 ml     Vent Information  SpO2: 96 %                Physical Exam:  General: The patient is sitting in chair ECHO needed for CHF evaluation  5. Diuresis is limited d/t renal function. Would recommend nephrology consult  6. Dietitian consulted for weight management and diet education  7. GI/DVT prophylaxis  8. Per Fleischner guidelines, no more follow-ups required for pulmonary nodule  9. Will need PFTs as outpatient       Thank you for allowing me to participate in the care of Narcisa Figueroa. Please feel free to call with questions. This plan of care was reviewed in collaboration with Dr. Ben Guzman    Electronically signed by JENELLE Mckeon CNP on 8/3/2021 at 12:00 PM      Note: This report was completed utilizing computer voice recognition software. Every effort has been made to ensure accuracy, however; inadvertent computerized transcription errors may be present      Addendum:  I have personally seen and examined patient. Patient with acute on chronic hypoxic respiratory failure secondary to combination of significant restriction due to his morbid central obesity, obesity hypoventilation syndrome and also ALEXIS and possibly CHF. Patient had an elevated proBNP of 4152 at time of presentation and has +2 pitting edema in his lower extremities. Adjusted his BiPAP settings to the home setting of 15/10. Would recommend to wean his FiO2 for saturations above 92%. Regarding his pulmonary nodule it is less than 6 mm and has been stable based on the Fleischner's criteria and does not need any further imaging. Discussed extremely about weight loss. Patient tells me that he has lost about 20 pounds in the past 6 months. Explained to him he needs to lose much more weight before his respiratory status improves. His BMI currently is 48.82. I personally saw, examined and provided care for the patient. Radiographs, labs and medication list were reviewed by me independently. Review of CNP documentation was conducted and revisions were made as appropriate.  I agree with the above documented exam, problem list and plan of care.     Dunia Hunter MD

## 2021-08-03 NOTE — PROGRESS NOTES
[facility]   Resident Progress Note    S:  Hospital day: 0    Brief Synopsis: Iveth Maxwell is a 61 y.o. male with a PMH of HTN, COPD, Sleep apnea, Type 2 diabetes, CAD status post stent placement, Depression, Hyperlipidemia and GERD. The patient presents to ED for Loss of Consciousness. He reports worsening shortness of breath for the past 3 months. Patient is on 6L oxygen at home that he states he wears consistently. Patient lost consciousness on 7/31/21 after walking around a store without oxygen. No seizures, no bladder incontinence. CT pulmonary with contrast showed cardiomegaly and moderate bilateral pleural effusions with atelectasis, likely CHF vs pneumonia. Also 2-4 mm nonspecific pulmonary nodules. Patient desaturated down to 80% in the ED on 4 L and was placed on 15 L nonrebreather mask. Patient currently saturating 96% on 15 L nonrebreather. The patient did not sleep good due to discomfort and back pain. He denies acute events during the night. Patient examined at bedside this morning. Patient states he is feeling well but would like to get some sleep. Patient denies currently feeling short of breath and only notices difficulty breathing when he is walking around. Patient states he can only walk \"15 feet\" before having to stop to catch his breath. Patient maintains conversation without having to stop and catch his breath. Patient states he was able to eat and did not notice and difficulty breathing while eating. Patient denies headache, dizziness, and chest pain. The patient has a right deep foot laceration.  Normal foot X-ray    Cont meds:    sodium chloride      dextrose      dextrose       Scheduled meds:    amLODIPine  10 mg Oral Daily    aspirin  81 mg Oral Daily    atenolol  50 mg Oral Daily    atorvastatin  80 mg Oral Nightly    budesonide  0.5 mg Nebulization BID    mirtazapine  30 mg Oral Nightly    pantoprazole  40 mg Oral QAM AC    traZODone 400 mg Oral Nightly    sodium chloride flush  5-40 mL Intravenous 2 times per day    enoxaparin  40 mg Subcutaneous Daily    ipratropium-albuterol  1 ampule Inhalation Q4H WA    Arformoterol Tartrate  15 mcg Nebulization BID    insulin glargine  30 Units Subcutaneous BID    [START ON 8/4/2021] insulin lispro  11 Units Subcutaneous TID      PRN meds: sodium chloride flush, sodium chloride, ondansetron **OR** ondansetron, polyethylene glycol, acetaminophen **OR** acetaminophen, hydrALAZINE, glucose, dextrose, glucagon (rDNA), dextrose, perflutren lipid microspheres, glucose, dextrose, glucagon (rDNA), dextrose     I reviewed the patient's Past Medical and Surgical History, Medications and Allergies. O:  VS: BP (!) 142/80   Pulse 68   Temp 97.9 °F (36.6 °C) (Temporal)   Resp 20   Ht 6' (1.829 m)   Wt (!) 360 lb (163.3 kg)   SpO2 91%   BMI 48.82 kg/m²     Physical Exam:   GENERAL: Alert, cooperative, no acute distress. Morbid obesity. HEENT: Normocephalic, atraumatic, no pallor or icterus. NECK: Supple, symmetrical, trachea midline, no cervical LAD. No carotid bruit or JVD. CHEST: No tenderness or deformity, full & symmetric excursion. LUNG: Clear to auscultation bilaterally,  respirations unlabored. Bilateral base crackles. HEART: RRR, S1 and S2 normal, no murmur, rub or gallop. ABDOMEN: SNTND, no masses, no organomegaly, no guarding, rebound or rigidity. EXTREMITIES:  Abrasion wounds in the right knee and proximal shin. Right foot laceration in the 2nd toe with poor aproximation. 1-2+ Bilateral pitting edema until the knee. No cyanosis. Distal pulses equal bilaterally. SKIN: Skin color, texture, turgor normal, no rashes. MUSCULOSKELETAL: No joint swelling, no muscle tenderness. Normal ROM in extremities. LYMPH NODES: no lymph node enlargement appreciated  NEUROLOGIC: Alert & Oriented.       Labs:  Na/K/Cl/CO2:  141/5.2/104/29 (08/03 5577)  BUN/Cr/glu/ALT/AST/amyl/lip: 22/1.7/--/--/--/--/-- (08/03 4074)  WBC/Hgb/Hct/Plts:  10.6/14.0/46.4/229 (08/02 1534)  estimated creatinine clearance is 73 mL/min (A) (based on SCr of 1.7 mg/dL (H)). Other pertinent labs as noted below    New Imaging:  CT HEAD WO CONTRAST   Final Result   No skull fracture or acute intracranial abnormality. CTA PULMONARY W CONTRAST   Final Result   No central pulmonary embolism or aortic dissection. The distal subsegmental   and peripheral vessels are poorly evaluated due to poor contrast.      Cardiomegaly with moderate pleural effusion with atelectasis likely CHF and   or pneumonia. 2-4 mm nonspecific pulmonary nodules. Consider surveillance according to   Fleischner society guidelines. XR FOOT RIGHT (MIN 3 VIEWS)   Final Result   Nonspecific edema of the foot. No acute fractures or dislocations in the right foot. XR CHEST (2 VW)    (Results Pending)       A/P:  Principal Problem:    Shortness of breath  Active Problems: Morbid obesity    Emphysema/COPD    Hypertension    Diabetes mellitus type 2 in obese ([facility])    CKD (chronic kidney disease) stage 3, GFR 30-59 ml/min ([facility])    Depression    CAD (coronary artery disease)    Pneumonia    Foot laceration    Hypoxia  Resolved Problems:    * No resolved hospital problems. *    1. Worsening shortness of breath 2/2 CHF vs COPD exacerbation  CTA pulmonary w contrast on 08/02: as listed above  CT lung screening on 08/02: as listed above  Patient has history of HFpEF, followed with Dr. Tracy Patel.  Last echo in 2016 (EF 65%)  Patient on 6 L NC at home, 4 L O2 in the car and uses BiPAP at night  Patient is currently saturating at 95% on 15 L nonrebreather mask  Unlikely pneumonia as patient is not complaining of any cough or sputum production, WBC not elevated, no fever, and procal not significantly elevated  Suspect CHF due to elevated ProBNP  · 20 mg Lasix IV today and will reassess tomorrow if more diuresis is necessary   · Strict Is and Os  · Pulmonology Consult - appreciate recommendations   · Wean O2 as able  · Order BiPAP  · Discontinue Levaquin   · Order Echocardiography                2. RALINE on CKD stage IIIb  Cr 2.1 mg/dL on admission, decreased to 1.7 mg/dL today following fluid bolus in ER  · Urine tests ordered, will calculate FENa and FEUrea  · Continue to monitor creatinine and GFR  · Hold HCTZ, Losartan, and Metformin                 3. HTN  -166, DBP 62-99  Atenolol may contribute to airway constriction. We will look back in the notes and see if patient had been tried on another beta-blocker. · Continue atenolol and amlodipine   · Continue to hold HCTZ and Losartan due to ARLINE. May consider restarting at least one of these tomorrow as patients blood pressure remains high. · Consider adding hydralazine                4. Foot laceration  Large laceration located under the R second toe, poorly approximated and continues to bleed  · Podiatry and wound care consulted                 5. Type II diabetes   Home regimen: Novolog 20U daily, Novolin 70/30 55U BID  HbA1c (08/03): 6.3  · Start patient's home insulin regimen  · Hold metformin due to ARLINE     6. HLD  · Continue Lipitor     7. GERD  · Continue Protonix      8. Depression  Patient's major motivating factor is his dog  · Continue Trazodone and Remeron  · Hold Gabapentin  · Psychologist, Dr. Magalis Castro, following     9. Hx of Hep C infection  · Will obtain hep C viral load     10.  Hx of cocaine abuse  · Will obtain Urine Drug test    DVT Prophylaxis: Lovenox  GI Prophylaxis: Protonix  Diet: Adult  Consider PT/OT consult      Electronically signed by Evan Boyd MD on 8/3/2021 at 7:13 PM  This case was discussed with attending physician Dr. Seema Flores

## 2021-08-03 NOTE — PROGRESS NOTES
Notified  concerning potassium of 5.2. patient also needs bipap, uses one at home but states \" No one available to bring it in. \" Also requesting have order for bipap machine.

## 2021-08-03 NOTE — PROGRESS NOTES
Pulmonary consult placed via [facility] serve to Dr Keren Moreira.   Dr Kalyani Odom taking new consults

## 2021-08-03 NOTE — PROGRESS NOTES
[facility] - Family [facility] Inpatient   Resident Progress Note    S:  Hospital day: 0   Brief Synopsis: Jak Villa is a 61 y.o. male who presented on 8/2/21 with worsening shortness of breath for the past 3 months. Patient is on 6L oxygen at home that he states he wears consistently. Patient lost consciousness on 7/31/21 after walking around a store without oxygen. CT pulmonary with contrast showed moderate pleural effusion with atelectasis, likely CHF vs pneumonia. Patient desaturated down to 80% in the ED on 4 L and was placed on 15 L nonrebreather mask. Patient currently saturating 96% on 15 L nonrebreather. Overnight/interim: no acute events overnight  Patient examined at bedside this morning. Patient states he is feeling well but would like to get some sleep. Patient denies currently feeling short of breath and only notices difficulty breathing when he is walking around. Patient states he can only walk \"15 feet\" before having to stop to catch his breath. Patient states this has been getting worse for the last 3 months. Patient maintains conversation with this writer without having to stop and catch his breath. Patient states he was able to eat and did not notice and difficulty breathing while eating. Patient denies headache, dizziness, and chest pain. Patient denies any questions or concerns at this time.          Cont meds:    sodium chloride      dextrose       Scheduled meds:    amLODIPine  10 mg Oral Daily    aspirin  81 mg Oral Daily    atenolol  50 mg Oral Daily    atorvastatin  80 mg Oral Nightly    budesonide  0.5 mg Nebulization BID    mirtazapine  30 mg Oral Nightly    pantoprazole  40 mg Oral QAM AC    traZODone  400 mg Oral Nightly    sodium chloride flush  5-40 mL Intravenous 2 times per day    enoxaparin  40 mg Subcutaneous Daily    insulin lispro  0-18 Units Subcutaneous TID WC    insulin lispro  0-9 Units Subcutaneous Nightly    ipratropium-albuterol  1 ampule Inhalation Q4H WA  insulin glargine  0.25 Units/kg Subcutaneous Nightly    insulin lispro  0.05 Units/kg Subcutaneous TID     levofloxacin  750 mg Intravenous Q24H     PRN meds: sodium chloride flush, sodium chloride, ondansetron **OR** ondansetron, polyethylene glycol, acetaminophen **OR** acetaminophen, hydrALAZINE, perflutren lipid microspheres, glucose, dextrose, glucagon (rDNA), dextrose     I reviewed the patient's past medical and surgical history, Medications and Allergies. O:  BP (!) 151/84   Pulse 64   Temp 97.5 °F (36.4 °C) (Temporal)   Resp 18   Ht 6' (1.829 m)   Wt (!) 360 lb (163.3 kg)   SpO2 96%   BMI 48.82 kg/m²   24 hour I&O: No intake/output data recorded. I/O this shift:  In: 240 [P.O.:240]  Out: -       Physical Exam  Constitutional:       General: He is not in acute distress. Appearance: He is obese. Comments: Wearing nonrebreather mask   HENT:      Head: Normocephalic and atraumatic. Eyes:      Conjunctiva/sclera: Conjunctivae normal.      Pupils: Pupils are equal, round, and reactive to light. Cardiovascular:      Rate and Rhythm: Normal rate and regular rhythm. Pulses: Normal pulses. Heart sounds: Normal heart sounds. Pulmonary:      Effort: Pulmonary effort is normal.      Comments: R base crackles  Abdominal:      General: Bowel sounds are normal.   Musculoskeletal:      Cervical back: Normal range of motion. Right lower leg: Edema present. Left lower leg: Edema present. Comments: Pitting edema up into the thigh   Skin:     General: Skin is warm. Findings: Abrasion (R shin/knee) and laceration (underneath R second toe, poor approximation) present. Neurological:      Mental Status: He is alert and oriented to person, place, and time.    Psychiatric:         Mood and Affect: Mood normal.         Labs:  Na/K/Cl/CO2:  141/5.2/104/29 (08/03 9867)  BUN/Cr/glu/ALT/AST/amyl/lip:  22/1.7/--/--/--/--/-- (08/03 8027)  WBC/Hgb/Hct/Plts:  10.6/14.0/46.4/229 (08/02 1534)  estimated creatinine clearance is 73 mL/min (A) (based on SCr of 1.7 mg/dL (H)). Other pertinent labs as noted below    Radiology:  CT HEAD WO CONTRAST   Final Result   No skull fracture or acute intracranial abnormality. CTA PULMONARY W CONTRAST   Final Result   No central pulmonary embolism or aortic dissection. The distal subsegmental   and peripheral vessels are poorly evaluated due to poor contrast.      Cardiomegaly with moderate pleural effusion with atelectasis likely CHF and   or pneumonia. 2-4 mm nonspecific pulmonary nodules. Consider surveillance according to   Fleischner society guidelines. XR FOOT RIGHT (MIN 3 VIEWS)   Final Result   Nonspecific edema of the foot. No acute fractures or dislocations in the right foot. XR CHEST (2 VW)    (Results Pending)         Resident Assessment and Plan       1. Worsening shortness of breath 2/2 CHF vs COPD exacerbation  CTA pulmonary w contrast 8/2: as listed above  CT lung screening 8/2: as listed above  Patient has history of HFpEF, followed with Dr. Tonia Zaidi. Last echo in 2016 (EF 65%)  Patient on 6 L NC at home and uses BiPAP at night  Patient is currently saturating at 95% on 15 L nonrebreather mask  Unlikely pneumonia as patient is not complaining of any cough or sputum production, WBC not elevated, and procal not significantly elevated   · 20 mg Lasix IV today and will reassess tomorrow if more diuresis is necessary   · Strict Is and Os  · Consult pulmonary - appreciate recommendations   · Wean O2 as able  · Order BiPAP machine for patient  · Discontinue Levaquin      2. ARLINE on CKD stage IIIb  Cr 2.1 mg/dL on admission, decreased to 1.7 mg/dL today following fluid bolus in ER  · Urine electrolytes ordered, will calculate FENa   · Continue to monitor creatinine and GFR  · Hold HCTZ, Losartan, and metformin      3. HTN  -166, DBP 62-99  Atenolol may contribute to airway constriction.  We will look back in the notes and see if patient had been tried on another beta-blocker   · Continue atenolol and amlodipine   · Continue to hold HCTZ and Losartan due to ARLINE. May consider restarting at least one of these tomorrow as patients blood pressure remains high. 4. Foot laceration  Large laceration located under the R second toe, poorly approximated and continues to bleed  · Podiatry and wound care consulted      5. Type II diabetes   Home regimen: Novolog 20U daily, Novolin 70/30 55U BID  HbA1c (8/3): 6.3  · Start patient's home insulin regimen  · Hold metformin due to ARLINE    6. HLD  · Continue Lipitor    7. GERD  · Continue protonix     8. Depression  Patient's major motivating factor is his dog  · Continue Trazodone and Remeron  · Hold Gabapentin  · Psychologist, Dr. Magalis Castro, following    9. Hx of Hep C infection  · Will obtain hep C viral load    10.  Hx of cocaine abuse  · Will obtain UDS       PT/OT evaluation: Will consult when appropriate  DVT prophylaxis: Lovenox   GI prophylaxis: Protonix  Disposition: Remain inpatient  Diet: Adult diet; regular        Electronically signed by Yudith Reed on 8/3/2021 at 8:48 AM  Attending physician: Dr. Seema Flores

## 2021-08-03 NOTE — TELEPHONE ENCOUNTER
No call, encounter opened to process CT Lung Screening. CT Lung Screen: 8/2/2021    Impression   Significantly limited examination as described.       There are small bilateral pleural effusions with bibasilar airspace   opacities, most in keeping with compressive atelectasis.  Several 2-4 mm   right-sided pulmonary nodules are felt to be stable.       New mediastinal lymphadenopathy, of uncertain etiology or clinical   significance.  Given these findings, correlation with diagnostic CT of the   chest is recommended chest.       LUNG RADS:   Per ACR Lung-RADS Version 1.1       Category 2S, Benign appearance or behavior.       Management:  Continue annual lung screening with LDCT in 12 months.       RECOMMENDATIONS:   If you would like to register your patient with the [facility], please contact the Nurse Navigator at   [phone]. Pack years: 68    Social History     Tobacco Use  Smoking Status: Former Smoker    Start Date: 1980   Quit Date: 08/05/2008   Types: Cigarettes   Packs/Day: 2   Years: 45   Pack Years: 68   Smokeless Tobacco: Never Used         Results letter sent to patient via my chart or mailed.      [address]

## 2021-08-03 NOTE — PROGRESS NOTES
[address] Attending    S: 61 y.o. male with a history of chronic respiratory failure on 4-6 L of oxygen at home, COPD with bipap at night, htn, CKD 3b, Dm2 (a1c 6.3), CAD s/p stent, MDD, anxiety, gerd, bph, HFpEF, tobacco/remote cocaine use, obesity, and + hcv ab who presented after shopping in [facility] without the use of his oxygen. He walked to the car and he passed out. He had no cp, palpitations or seizure-like activity. He was short of breath before and walking throughout the store. He was told his lips were blue. Also with increasing dyspnea over the past few months. His oxygen saturation was80% on 4L in the ER. He also was found to have an ARLINE on CKD. He was placed on a NRB mask in the ER. This morning, he states his breathing is slightly improved. O: VS- Blood pressure (!) 150/80, pulse 68, temperature 97.6 °F (36.4 °C), temperature source Temporal, resp. rate 20, height 6' (1.829 m), weight (!) 360 lb (163.3 kg), SpO2 95 %. Exam is as noted by resident with the following changes, additions or corrections:  Gen: NAD on 15 NRB mask   HEENT: NCAT, PERRL  CV-RRR no M/R/G   Lungs-rt basilar crackles  ABD-soft nonttp no masses   Ext-no C/C; 1+ pitting edema from his feet to his thighs  Laceration between his second and third right toe    Impressions:   Principal Problem:    Shortness of breath  Active Problems: Morbid obesity    Emphysema/COPD    Hypertension    Diabetes mellitus type 2 in obese (HCC)    CKD (chronic kidney disease) stage 3, GFR 30-59 ml/min ([facility])    Depression    CAD (coronary artery disease)    Pneumonia    Foot laceration  Resolved Problems:    * No resolved hospital problems. *      Plan:  Pt requiring increased oxygen with acute on chronic respiratory failure. CT with pleural effusions, doubt infection. Lasix, echo. Elevated BNP. Follow I and O for hx of HFpEF and suspected worsening along with the effusions/copd.  Also on nebs.  Consider changing b-blocker for Cad. Restart home insulin medications. Metformin on hold. Evaristo on ckd. Urine lytes pending. BP meds on hold. Cr improving. Will need tobacco cessation. uds  PT/OT     Attending Physician Statement  I have reviewed the chart and seen the patient with the resident(s). I personally reviewed images, EKG's and similar tests, if present. I personally reviewed and performed key elements of the history and exam.  I have reviewed and confirmed student and/or resident history and exam with changes as indicated above. I agree with the assessment, plan and orders as documented by the resident. Please refer to the resident and/or student note for additional information.       Hasmukh Pappas MD

## 2021-08-03 NOTE — H&P
[facility] - Family [facility] Resident Inpatient  History and Physical      CC: Shortness of breath    HPI: History obtained from patient. Patient is oriented to time, place, person. Emir Mabry is a 61 y.o. male with a PMH of hypertension, COPD, sleep apnea, type 2 diabetes, CAD status post stent placement, depression, hyperlipidemia and GERD, who presents to ED for Loss of Consciousness (was in line at the store and felt short of breath and states he passed out this past saturday. 92% on 6L in triage. Wears 6L at home )  . Patient reports shopping on Saturday when he began to feel short of breath while walking in the store. While returning to his car to retrieve his oxygen tank patient reports blacking out. Patient does not recall falling down but does remember waking up and sitting up. He denies any also of bladder function or seizure-like activities. He reports catching his breath after sitting down for some time and returned to his car. Patient reports having a scheduled CT scan performed today in the hospital and decided to be evaluated for his shortness of breath and foot laceration. He denies any chest pain, palpitations, dizziness, nausea, vomiting, diarrhea or illness at this time. ED Course: The patient remained hemodynamically stable. While in the ED patient received 1 L bolus of fluids and was placed on 15 L nonrebreather mask after oxygenation was found to be ED 80% on 4 L nasal cannula. Patient then received 1 dose of Levaquin after CT scan showed possible pneumonia. Labs CBC within normal limits, CMP shows creatinine 2.1 baseline 1.4, lactic acid 4.1, proBNP 4000s, troponin 24  Imaging was X-ray of right foot: No fractures or dislocation, noted  CT of head: No fractures or intracranial bleeding  CTA pulmonary: Cardiomegaly with moderate pleural effusion  CT lung screen: Bilateral pleural effusion  EKG: normal sinus rhythm, unchanged from previous tracings.    Patient was given oxygen via nonrebreather mask and Levaquin. Pt admitted for shortness of breath    PMH:  has a past medical history of Anxiety, Asthma, CAD (coronary artery disease), CHF (congestive heart failure) ([facility] [address] Chills, Chronic sinusitis, Cocaine abuse ([facility] [address] COPD (chronic obstructive pulmonary disease) ([facility] [address] Depression, Diabetes mellitus ([facility] [address] GERD (gastroesophageal reflux disease), H/O cardiovascular stress test, Hand fracture, Hepatitis C, History of tobacco abuse, Hyperlipidemia, Hypertension, Night sweats, NSTEMI (non-ST elevation myocardial infarction) ([facility] [address] Obesity, Oxygen dependent, Panic attacks, Pneumonia, Sleep apnea, and SOB (shortness of breath). PSH:  has a past surgical history that includes other surgical history; Tonsillectomy; Diagnostic Cardiac Cath Lab Procedure (2/25/2009); Colonoscopy (2011); and Colonoscopy (9/23/15). FH: family history includes Diabetes in his mother; Heart Failure in his father; Heart Surgery in his brother. Social:  reports that he quit smoking about 13 years ago. His smoking use included cigarettes. He started smoking about 41 years ago. He has a 76.00 pack-year smoking history. He has never used smokeless tobacco. He reports that he does not drink alcohol and does not use drugs. Allergies: Allergies   Allergen Reactions    Amoxicillin Hives and Shortness Of Breath    Penicillins Hives and Shortness Of Breath     TRIAMOX        Home Medications:   No current facility-administered medications on file prior to encounter. Current Outpatient Medications on File Prior to Encounter   Medication Sig Dispense Refill    traZODone (DESYREL) 100 MG tablet take 4 tablets by mouth NIGHTLY 120 tablet 0    blood glucose monitor strips Test 3 times a day & as needed for symptoms of irregular blood glucose. Dispense sufficient amount for indicated testing frequency plus additional to accommodate PRN testing needs.  300 strip 1    albuterol (PROVENTIL) (2.5 MG/3ML) 0.083% nebulizer solution Take 3 mLs by nebulization 4 times daily inhale contents of 1 vial in nebulizer four times a day 1080 mL 1    insulin aspart (NOVOLOG PENFILL) 100 UNIT/ML injection cartridge Inject 20 Units into the skin daily 5 Cartridge 3    insulin 70-30 (NOVOLIN 70/30) (70-30) 100 UNIT per ML injection vial Inject 55 Units into the skin 2 times daily 8 vial 3    atorvastatin (LIPITOR) 80 MG tablet take 1 tablet by mouth once daily 90 tablet 1    finasteride (PROSCAR) 5 MG tablet Take 1 tablet by mouth daily 90 tablet 3    B-D INS SYR ULTRAFINE 1CC/30G 30G X 1/2\" 1 ML MISC 1 each by Does not apply route 3 times daily 100 each 3    amLODIPine (NORVASC) 10 MG tablet take 1 tablet by mouth once daily 90 tablet 1    omeprazole (PRILOSEC) 20 MG delayed release capsule Take 1 capsule by mouth daily 90 capsule 1    atenolol (TENORMIN) 50 MG tablet take 1 tablet by mouth twice a day 180 tablet 1    metFORMIN (GLUCOPHAGE) 1000 MG tablet take 1 tablet by mouth twice a day with meals 180 tablet 1    hydroCHLOROthiazide (HYDRODIURIL) 25 MG tablet take 1 tablet by mouth once daily 90 tablet 1    tamsulosin (FLOMAX) 0.4 MG capsule take 1 capsule by mouth once daily 90 capsule 1    Lancets MISC Check sugars as directed.  Aury Mendes for pharmacy to substitute 100 each 5    Insulin Syringes, Disposable, U-100 1 ML MISC 1 each by Does not apply route daily 100 each 3    losartan (COZAAR) 25 MG tablet take 1 tablet by mouth once daily 90 tablet 0    Blood Glucose Monitoring Suppl (ONE TOUCH ULTRA 2) w/Device KIT 1 kit by Does not apply route daily 1 kit 0    blood glucose test strips (ASCENSIA AUTODISC VI;ONE TOUCH ULTRA TEST VI) strip 1 each by Does not apply route 3 times daily 100 strip 5    docusate sodium (DOCQLACE) 100 MG capsule Take 1 capsule by mouth 2 times daily 60 capsule 5    mirtazapine (REMERON) 30 MG tablet take 1 tablet by mouth every evening 60 tablet 3    aspirin 81 MG EC tablet Take 1 tablet by mouth daily 90 tablet 1    Omega-3 Fatty Acids (FISH OIL) 1000 MG CAPS Take 1 capsule by mouth daily 90 capsule 1    Handicap Placard MISC by Does not apply route 1 each 0    glucose monitoring kit (FREESTYLE) monitoring kit 1 kit by Does not apply route daily 1 kit 0    beclomethasone (QVAR) 80 MCG/ACT inhaler Inhale 2 puffs into the lungs 2 times daily      OXYGEN Inhale 6 L into the lungs continuous       Ipratropium-Albuterol (COMBIVENT IN) Inhale 2 puffs into the lungs 2 times daily       gabapentin (NEURONTIN) 800 MG tablet Take 1 tablet by mouth nightly for 30 days. 30 tablet 1    promethazine (PHENERGAN) 25 MG tablet Take 1 tablet by mouth every 6 hours as needed for Nausea 30 tablet 3       ROS:   Const: no fever, chills, no recent unexplained weight gain/loss  HEENT: no URI symptoms  Resp: Shortness of breath. No cough, no sputum, no pleuritic chest pain,   Cardio: Dyspnea on exertion. No chest pain, no palpitation,   GI: No dysphagia, no reflux; no abdominal pain, no n/v;  : No dysuria, no frequency  MSK: no joint pain, no myalgia, no change in ROM  Neuro: no focal weakness, no slurred speech, no double vision, no numbness or tingling in extremities  Endo: no heat/cold intolerance, no polyphagia, polydipsia or polyuria  Hem: no increased bleeding,  Skin: no skin changes    PE:  Blood pressure (!) 151/99, pulse 59, temperature 97.3 °F (36.3 °C), resp. rate 19, height 6' (1.829 m), weight (!) 360 lb (163.3 kg), SpO2 99 %. General: Alert, cooperative, no acute distress. HEENT: Normocephalic, atraumatic. PERRL, conjunctiva/corneas clear, no pallor or icterus. Neck: Supple, symmetrical, trachea midline, no JVD. Chest: No tenderness or deformity, full & symmetric excursion  Lung: Clear to auscultation bilaterally,  respirations unlabored. No rales/wheezing/rubs  Heart: RRR, S1 and S2 normal, no murmur, rub or gallop.  DP pulses 2/4  Abdomen: Soft, obese, nontender  Genital/Rectal: deferred  Extremities:  Extremities normal, atraumatic, no cyanosis or edema. Distal pulses equal bilaterally. 2+ pitting edema up to knees. Skin: Skin color, texture, turgor normal, no rashes or lesions  Musculoskeletal: No joint swelling, no muscle tenderness. Normal ROM in extremities. Neurologic: Alert & Oriented; Normal and symmetric strength in UEs and LEs; Sensation intact  Psychiatric: appropriate affect. Intact judgment and insight.      Labs:   Results for orders placed or performed during the hospital encounter of 08/02/21   COVID-19, Rapid    Specimen: Nasopharyngeal Swab   Result Value Ref Range    SARS-CoV-2, NAAT Not Detected Not Detected   CBC auto differential   Result Value Ref Range    WBC 10.6 4.5 - 11.5 E9/L    RBC 5.13 3.80 - 5.80 E12/L    Hemoglobin 14.0 12.5 - 16.5 g/dL    Hematocrit 46.4 37.0 - 54.0 %    MCV 90.4 80.0 - 99.9 fL    MCH 27.3 26.0 - 35.0 pg    MCHC 30.2 (L) 32.0 - 34.5 %    RDW 15.3 (H) 11.5 - 15.0 fL    Platelets 565 409 - 409 E9/L    MPV 11.8 7.0 - 12.0 fL    Neutrophils % 74.9 43.0 - 80.0 %    Immature Granulocytes % 0.6 0.0 - 5.0 %    Lymphocytes % 16.1 (L) 20.0 - 42.0 %    Monocytes % 7.1 2.0 - 12.0 %    Eosinophils % 0.8 0.0 - 6.0 %    Basophils % 0.5 0.0 - 2.0 %    Neutrophils Absolute 7.97 (H) 1.80 - 7.30 E9/L    Immature Granulocytes # 0.06 E9/L    Lymphocytes Absolute 1.71 1.50 - 4.00 E9/L    Monocytes Absolute 0.76 0.10 - 0.95 E9/L    Eosinophils Absolute 0.08 0.05 - 0.50 E9/L    Basophils Absolute 0.05 0.00 - 0.20 E9/L   Comprehensive Metabolic Panel   Result Value Ref Range    Sodium 142 132 - 146 mmol/L    Potassium 5.0 3.5 - 5.0 mmol/L    Chloride 106 98 - 107 mmol/L    CO2 26 22 - 29 mmol/L    Anion Gap 10 7 - 16 mmol/L    Glucose 120 (H) 74 - 99 mg/dL    BUN 26 (H) 6 - 23 mg/dL    CREATININE 2.1 (H) 0.7 - 1.2 mg/dL    GFR Non-African American 32 >=60 mL/min/1.73    GFR African American 39     Calcium 9.2 8.6 - 10.2 mg/dL    Total Protein 6.3 (L) 6.4 - 8.3 g/dL    Albumin 3.6 3.5 - 5.2 g/dL    Total Bilirubin 0.3 0.0 - 1.2 mg/dL    Alkaline Phosphatase 43 40 - 129 U/L    ALT 20 0 - 40 U/L    AST 17 0 - 39 U/L   Lactic Acid, Plasma   Result Value Ref Range    Lactic Acid 4.1 (HH) 0.5 - 2.2 mmol/L   Troponin   Result Value Ref Range    Troponin, High Sensitivity 24 (H) 0 - 11 ng/L   Brain Natriuretic Peptide   Result Value Ref Range    Pro-BNP 4,152 (H) 0 - 125 pg/mL   POCT glucose   Result Value Ref Range    Glucose 138 mg/dL    QC OK? yes    POCT Glucose   Result Value Ref Range    Meter Glucose 138 (H) 74 - 99 mg/dL       Imaging:  CT HEAD WO CONTRAST   Final Result   No skull fracture or acute intracranial abnormality. CTA PULMONARY W CONTRAST   Final Result   No central pulmonary embolism or aortic dissection. The distal subsegmental   and peripheral vessels are poorly evaluated due to poor contrast.      Cardiomegaly with moderate pleural effusion with atelectasis likely CHF and   or pneumonia. 2-4 mm nonspecific pulmonary nodules. Consider surveillance according to   Fleischner society guidelines. XR FOOT RIGHT (MIN 3 VIEWS)   Final Result   Nonspecific edema of the foot. No acute fractures or dislocations in the right foot. XR CHEST (2 VW)    (Results Pending)       Assessment and Plan  Active Problems:    Pneumonia  Resolved Problems:    * No resolved hospital problems. *    Shortness of breath  -differential diagnosis pneumonia versus CHF versus pulmonary renal syndrome  - continue oxygen therapy on 15 L nonrebreather; taper off as patient tolerates. -DuoNebs every 4 hours while awake and Pulmicort twice daily  -hold fluids at this time  -repeat BMP, lactic acid at this time. procalcitonin and respiratory panel pending  -continue telemetry monitoring  -consult for respiratory care for evaluation  -last recorded echocardiogram showed ejection fraction of 65% in March 2016. We'll repeat echo at this time.   -Monitor daily weights and ins and outs. -EKG NSR, Will repeat EKG  -CBC and BMP daily  -continue ASA daily  -if BMP is improving creatinine level and patient continues to remain afebrile, will begin gentle diuresis with Bumex 0.5 twice daily  -continue Levaquin daily. Depression  -stable at this time  - continue home dose trazodone and Remeron  -hold gabapentin at this time    Type 2 diabetes  -hold home dose metoprolol  -placed on Humalog high-dose correctional insulin  -continue Levaquin daily    GERD  Protonix daily    Hypertension  -continue atenolol and amlodipine  -we'll hold HCTZ and losartan due to ARLINE  -hydralazine as needed for blood pressure > 170/95. Foot laceration  - consult wound care    Hyperlipidemia  continue Lipitor    Acute on chronic kidney disease  -creatinine 2.1 above baseline of 1.4.  Status post 1 L bolus fluid  -repeat BMP at this time      DVT / GI prophylaxis: lovenox 40mg SC daily and Protonix    Dispo -       Electronically signed by Dave Barnes MD on 8/2/21 at 9:57 PM EDT  This case was discussed with attending physician: Dr. Silvia Nichols

## 2021-08-04 NOTE — PROGRESS NOTES
[facility] Inpatient   Resident Progress Note    S:  Hospital day: 1    Brief Synopsis: Jessica Bolanos is a 61 y.o. male with a PMH of PMH of HTN, COPD, Sleep apnea, Type 2 diabetes, CAD status post stent placement, Depression, Hyperlipidemia, Hepatitis C and GERD.     The patient presents to ED for Loss of Consciousness. He reports worsening shortness of breath for the past 3 months. Patient is on 6L oxygen at home that he states he wears consistently. Patient lost consciousness on 7/31/21 after walking around a store without oxygen. No seizures, no bladder incontinence.     CT pulmonary with contrast showed cardiomegaly and moderate bilateral pleural effusions with atelectasis, likely CHF vs pneumonia. Also 2-4 mm nonspecific pulmonary nodules. Patient desaturated down to 80% in the ED on 4 L and was placed on 15 L nonrebreather mask. Patient currently saturating 96% on NC 15 L O2. He is wearing BiPAP during the night. Patient was compliant with his BiPAP last night.     Patient states he is feeling okay today and slept a little better last night. He denies acute events during the night. SW consulted yesterday. Patient states he lives in a 2 floors house with 1 roommate. His room is in the 2nd floor.     Patient examined at bedside this morning. Patient was found sitting up after eating breakfast wearing his high flow nasal cannula. Patient denies currently feeling short of breath and only notices difficulty breathing when he is walking around. Patient maintains conversation without having to stop and catch his breath. Patient states he was able to eat and did not notice and difficulty breathing while eating. Patient denies headache, dizziness, and chest pain. He has 1-2+ bilateral pitting edema in his lower extremities.     The patient has a right deep foot laceration. Normal foot X-ray.  Patient states he hopes someone comes to look at his foot laceration soon as he does not want it to become infected. Order Wound care consult. Cont meds:    sodium chloride      dextrose      dextrose       Scheduled meds:    furosemide  40 mg Intravenous Daily    amLODIPine  10 mg Oral Daily    aspirin  81 mg Oral Daily    atenolol  50 mg Oral Daily    atorvastatin  80 mg Oral Nightly    budesonide  0.5 mg Nebulization BID    mirtazapine  30 mg Oral Nightly    pantoprazole  40 mg Oral QAM AC    traZODone  400 mg Oral Nightly    sodium chloride flush  5-40 mL Intravenous 2 times per day    enoxaparin  40 mg Subcutaneous Daily    ipratropium-albuterol  1 ampule Inhalation Q4H WA    insulin glargine  30 Units Subcutaneous BID    insulin lispro  11 Units Subcutaneous TID WC     PRN meds: perflutren lipid microspheres, sodium chloride flush, sodium chloride, ondansetron **OR** ondansetron, polyethylene glycol, acetaminophen **OR** acetaminophen, hydrALAZINE, glucose, dextrose, glucagon (rDNA), dextrose, glucose, dextrose, glucagon (rDNA), dextrose     I reviewed the patient's Past Medical and Surgical History, Medications and Allergies. O:  VS: /72   Pulse 84   Temp 97.8 °F (36.6 °C) (Temporal)   Resp 16   Ht 6' (1.829 m)   Wt (!) 360 lb (163.3 kg)   SpO2 (!) 88%   BMI 48.82 kg/m²     Physical Exam:  Physical Exam  Constitutional:       General: He is not in acute distress. Appearance: Normal appearance. He is obese. He is not ill-appearing or toxic-appearing. HENT:      Head: Normocephalic and atraumatic. Neck:      Vascular: No carotid bruit. Cardiovascular:      Rate and Rhythm: Normal rate and regular rhythm. Pulses: Normal pulses. Heart sounds: Normal heart sounds. No murmur heard. No friction rub. No gallop. Pulmonary:      Effort: Pulmonary effort is normal. No respiratory distress. Breath sounds: Normal breath sounds. No stridor. No rales. Abdominal:      General: Bowel sounds are normal.      Palpations: Abdomen is soft. Tenderness:  There is no abdominal tenderness. There is no guarding or rebound. Musculoskeletal:         General: No swelling or deformity. Normal range of motion. Cervical back: Normal range of motion. No tenderness. Right lower leg: Edema present. Left lower leg: Edema present. Feet:       Comments: 1-2+ bilateral pitting edema   Feet:      Comments: Deeo laceration around the 2nd toe  Skin:     General: Skin is warm. Coloration: Skin is not jaundiced or pale. Findings: No erythema or rash. Neurological:      General: No focal deficit present. Mental Status: He is alert and oriented to person, place, and time. Mental status is at baseline. Sensory: No sensory deficit. Motor: No weakness. Psychiatric:         Mood and Affect: Mood normal.         Behavior: Behavior normal.         Thought Content: Thought content normal.         Judgment: Judgment normal.         Labs:  Na/K/Cl/CO2:  142/3.9/105/28 (08/04 3998)  BUN/Cr/glu/ALT/AST/amyl/lip:  22/1.8/--/--/--/--/-- (08/04 9197)  WBC/Hgb/Hct/Plts:  13.3/13.6/44.6/230 (08/04 9694)  estimated creatinine clearance is 69 mL/min (A) (based on SCr of 1.8 mg/dL (H)). Other pertinent labs as noted below    New Imaging:  CT HEAD WO CONTRAST   Final Result   No skull fracture or acute intracranial abnormality. CTA PULMONARY W CONTRAST   Final Result   No central pulmonary embolism or aortic dissection. The distal subsegmental   and peripheral vessels are poorly evaluated due to poor contrast.      Cardiomegaly with moderate pleural effusion with atelectasis likely CHF and   or pneumonia. 2-4 mm nonspecific pulmonary nodules. Consider surveillance according to   Fleischner society guidelines. XR FOOT RIGHT (MIN 3 VIEWS)   Final Result   Nonspecific edema of the foot. No acute fractures or dislocations in the right foot.          XR CHEST (2 VW)    (Results Pending)       A/P:  Principal Problem:    Shortness of breath  Active Problems: Morbid obesity    Emphysema/COPD    Hypertension    Diabetes mellitus type 2 in obese (HCC)    CKD (chronic kidney disease) stage 3, GFR 30-59 ml/min ([facility])    Depression    CAD (coronary artery disease)    Pneumonia    Foot laceration    Hypoxia  Resolved Problems:    * No resolved hospital problems. *    1. Worsening shortness of breath 2/2 CHF vs COPD exacerbation  CTA pulmonary w contrast on 08/02: as listed above  CT lung screening on 08/02: as listed above  Patient has history of HFpEF, followed with Dr. Hayley Guajardo. Last echo in 2016 (EF 65%)  Patient on 6 L NC at home, 4 L O2 in the car and uses BiPAP at night  Patient is currently saturating at 95% on 15 L NC  Unlikely pneumonia as patient is not complaining of any cough or sputum production, no fever, and procal not significantly elevated  Pulmonary nodules on CT - Pulmonology following  WBCs today 13. 3. Possible steroids inhalation  Suspect CHF due to elevated ProBNP  · 20 mg IV Lasix yesterday. Patient remains edematous. Start Lasix IV 40 mg daily. · Strict Is and Os  · Pulmonology following - continue oxygen therapy and BiPAP, start Brovana BID, continue Pulmicort BID and Duonebs  · Wean O2 as able  · Awaiting Echocardiography  · Monitor WBCs                2. ARLINE on CKD stage IIIb  Cr 2.1 mg/dL on admission, 1.8 mg/dL today following fluid bolus in ER  · Urine tests ordered, will calculate FENa and FEUrea  · FeNa 1.2% and FeUrea 48.1% suspecting an intrinsic renal disease  · Nephro consulted - Dr. Todd Yung  · Continue to monitor creatinine, BUN and GFR  · Hold HCTZ, Losartan, and Metformin                 3.  HTN  -142, DBP 77-81  Atenolol may contribute to airway constriction. We will look back in the notes and see if patient had been tried on another beta-blocker. Patient would likely benefit from switching to metoprolol or carvedilol prior to discharge.   · Continue atenolol and amlodipine   · Continue to hold HCTZ and

## 2021-08-04 NOTE — PROGRESS NOTES
podiatory consult called to Dr. Alma Crowley  Nephrology consult called to [facility] Known to Dr. Iesha Bocanegra taking consults.

## 2021-08-04 NOTE — PROGRESS NOTES
[facility] Inpatient   Resident Progress Note    S:  Hospital day: 1   Brief Synopsis: Magnolia Winchester is a 61 y.o. male who presented on 8/2/21 with worsening shortness of breath for the past 3 months. Patient is on 6L oxygen at home that he states he wears consistently. Patient lost consciousness on 7/31/21 after walking around a store without oxygen. CT pulmonary with contrast showed moderate pleural effusion with atelectasis, likely CHF vs pneumonia. Patient desaturated down to 80% in the ED on 4 L and was placed on 15 L nonrebreather mask. Patient currently saturating 90% on 15 L high flow nasal cannula. Overnight/interim: no acute events overnight  Patient examined at bedside this morning. Patient was found sitting up after eating breakfast wearing his high flow nasal cannula. Patient states he is feeling okay today and slept a little better last night. Patient states he has not been able to walk around because his NC hose is too short for him to go anywhere. Patient states he is willing to walk with staff once he has portable oxygen. Patient was compliant with his BiPAP last night. Patient states he hopes someone comes to look at his foot laceration soon as he does not want it to become infected. Patient denies any headache, dizziness, or chest pain. Patient denies any other questions or concerns at this time.       Cont meds:    sodium chloride      dextrose      dextrose       Scheduled meds:    amLODIPine  10 mg Oral Daily    aspirin  81 mg Oral Daily    atenolol  50 mg Oral Daily    atorvastatin  80 mg Oral Nightly    budesonide  0.5 mg Nebulization BID    mirtazapine  30 mg Oral Nightly    pantoprazole  40 mg Oral QAM AC    traZODone  400 mg Oral Nightly    sodium chloride flush  5-40 mL Intravenous 2 times per day    enoxaparin  40 mg Subcutaneous Daily    ipratropium-albuterol  1 ampule Inhalation Q4H WA    Arformoterol Tartrate  15 mcg Nebulization BID    insulin glargine  30 Units Subcutaneous BID    insulin lispro  11 Units Subcutaneous TID WC     PRN meds: sodium chloride flush, sodium chloride, ondansetron **OR** ondansetron, polyethylene glycol, acetaminophen **OR** acetaminophen, hydrALAZINE, glucose, dextrose, glucagon (rDNA), dextrose, perflutren lipid microspheres, glucose, dextrose, glucagon (rDNA), dextrose     I reviewed the patient's past medical and surgical history, Medications and Allergies. O:  /81   Pulse 68   Temp 96.8 °F (36 °C) (Temporal)   Resp 16   Ht 6' (1.829 m)   Wt (!) 360 lb (163.3 kg)   SpO2 90%   BMI 48.82 kg/m²   24 hour I&O: I/O last 3 completed shifts: In: 240 [P.O.:240]  Out: 2025 [Urine:2025]  No intake/output data recorded. Physical Exam  Constitutional:       General: He is not in acute distress. Appearance: He is obese. Comments: Wearing high flow nasal cannula (15 L)   HENT:      Head: Normocephalic and atraumatic. Eyes:      Conjunctiva/sclera: Conjunctivae normal.      Pupils: Pupils are equal, round, and reactive to light. Cardiovascular:      Rate and Rhythm: Normal rate and regular rhythm. Pulses: Normal pulses. Heart sounds: Normal heart sounds. Pulmonary:      Effort: Pulmonary effort is normal. No respiratory distress. Breath sounds: No wheezing or rhonchi. Abdominal:      General: Bowel sounds are normal.   Musculoskeletal:      Cervical back: Normal range of motion. Right lower leg: Edema present. Left lower leg: Edema present. Comments: trace pitting edema from foot to shin   Skin:     General: Skin is warm. Findings: Abrasion (R shin/knee) and laceration (underneath R second toe, poor approximation) present. Neurological:      Mental Status: He is alert and oriented to person, place, and time.    Psychiatric:         Mood and Affect: Mood normal.         Labs:  Na/K/Cl/CO2:  141/5.2/104/29 (08/03 3310)  BUN/Cr/glu/ALT/AST/amyl/lip: 22/1.7/--/--/--/--/-- (08/03 9396)  WBC/Hgb/Hct/Plts:  13.3/13.6/44.6/230 (08/04 2700)  estimated creatinine clearance is 73 mL/min (A) (based on SCr of 1.7 mg/dL (H)). Other pertinent labs as noted below    Radiology:  CT HEAD WO CONTRAST   Final Result   No skull fracture or acute intracranial abnormality. CTA PULMONARY W CONTRAST   Final Result   No central pulmonary embolism or aortic dissection. The distal subsegmental   and peripheral vessels are poorly evaluated due to poor contrast.      Cardiomegaly with moderate pleural effusion with atelectasis likely CHF and   or pneumonia. 2-4 mm nonspecific pulmonary nodules. Consider surveillance according to   Fleischner society guidelines. XR FOOT RIGHT (MIN 3 VIEWS)   Final Result   Nonspecific edema of the foot. No acute fractures or dislocations in the right foot. XR CHEST (2 VW)    (Results Pending)         Resident Assessment and Plan       1. Acute on chronic respiratory failure 2/2 CHF vs COPD exacerbation  CTA pulmonary w contrast 8/2: as listed above  CT lung screening 8/2: as listed above  Patient has history of HFpEF, followed with Dr. Kae Strange. Last echo in 2016 (EF 65%)  Patient on 6 L NC at home and uses BiPAP at night  Patient is currently saturating at 90% on 15 L high-flow nasal cannula  Patient received 1 dose IV Lasix 20 mg yesterday - net output since admission 1.7 L. Patient remains edematous. WBC selevated today at 13.3, possibly due to inhalation steroids  · Awaiting Echo  · 40 mg Lasix IV starting today as patient remains edematous and has a high oxygen requirement. · Strict Is and Os  · Pulmonary following - continue oxygen therapy and BiPAP, start Brovana BID, continue Pulmicort BID and Duonebs  · Wean O2 as able. · Monitor WBC count      2. ARLINE on CKD stage IIIb  Cr 2.1 mg/dL on admission, decreased to 1.7 mg/dL following fluid bolus in ER. 1.8 mg/dL today. Baseline 1.4 mg/dL.   FEUrea 48.1% consistent with intrinsic renal disease  [facility] - Dr. Dheeraj Estrella  · Consult nephrology for ARLINE and CKD management   · Continue to monitor creatinine and GFR  · Hold HCTZ, Losartan, and metformin      3. HTN  -142, DBP 77-81  Atenolol may contribute to airway constriction. Patient would likely benefit from switching to metoprolol or carvedilol prior to discharge   · Continue atenolol and amlodipine   · Continue to hold HCTZ and Losartan due to ARLINE. 4. Foot laceration  Large laceration located under the R second toe, poorly approximated and continues to bleed  · Wound care consulted      5. Type II diabetes   Home regimen: Novolog 20U daily, Novolin 70/30 55U BID  HbA1c (8/3): 6.3  · Continue Lantus 30 U BID and Humalog 11 U with meals (pharmacy replacement for patient's home regimen)  · Hold metformin due to ARLINE    6. HLD  · Continue Lipitor    7. GERD  · Continue protonix     8. Depression  Patient's major motivating factor is his dog  · Continue Trazodone and Remeron  · Hold Gabapentin  · Psychologist, Dr. Colin Doan, following    9. Hx of Hep C infection  · Awaiting hep C viral load    10. Hx of cocaine abuse  UDS positive for fentanyl - possible false positive due to patient's use of Trazodone   Patient denies any recent drug abuse. Endorses a past history of IV cocaine abuse and abuse of various other drugs. Denies ever using heroin.        PT/OT evaluation: Consulted  DVT prophylaxis: Lovenox   GI prophylaxis: Protonix  Disposition: Remain inpatient  Diet: Adult diet; regular        Electronically signed by Norma Mai on 8/4/2021 at 8:07 AM  Attending physician: Dr. Finn Molina

## 2021-08-04 NOTE — PROGRESS NOTES
Occupational Therapy    OCCUPATIONAL THERAPY INITIAL EVALUATION     [address] [address] [facility]  [address] [facility], [facility]                                             Patient Name: Nicola Mcconnell  MRN: [id number]  : 1960  Room: [address] [place] [address]    Evaluating OT: DUARTE Larkin, OTR/L; #[id number]    Referring Provider: Katie Montoya MD  Specific Provider Orders/Date: OT Evaluation and Treatment, 2021    Diagnosis: Pneumonia [J18.9]  Hypoxia [R09.02]   - Presented to hospital after LOC at store  - Patient with R foot laceration, (-) for fx per imaging (2021)  Surgery: none   Pertinent Medical History: anxiety, asthma, CAD, CHF, cocaine abuse, depression, DM, GERD, B hand fx (), hep C, HLD, HTN, NSTEMI, obesity, O2 dependent, PNA, panic attacks, SOB        Precautions:  Fall Risk, tele, HFNC supplemental O2, impulsivity, alarm      Assessment of current deficits   [x] Functional mobility   [x]ADLs  [x] Strength               [x]Cognition   [x] Functional transfers   [x] IADLs         [x] Safety Awareness   [x]Endurance   [x] Fine Coordination              [x] Balance      [] Vision/perception   []Sensation    []Gross Motor Coordination  [] ROM  [] Delirium                   [] Motor Control     OT PLAN OF CARE   OT POC based on physician orders, patient diagnosis and results of clinical assessment    Frequency/Duration: 1-3 days/wk for 2 weeks PRN   Specific OT Treatment Interventions to include:   * Instruction/training on adapted ADL techniques and AE recommendations to increase functional independence within precautions       * Training on energy conservation strategies, correct breathing pattern and techniques to improve independence/tolerance for self-care routine  * Functional transfer/mobility training/DME recommendations for increased independence, safety, and fall prevention  * Patient/Family education to increase follow through with safety techniques and functional independence  * Recommendation of environmental modifications for increased safety with functional transfers/mobility and ADLs  * Cognitive retraining/development of therapeutic activities to improve problem solving, judgement, memory, and attention for increased safety/participation in ADL/IADL tasks  * Therapeutic exercise to improve motor endurance, ROM, and functional strength for ADLs/functional transfers  * Therapeutic activities to facilitate/challenge dynamic balance, stand tolerance for increased safety and independence with ADLs  * Therapeutic activities to facilitate gross/fine motor skills for increased independence with ADLs  * Positioning to improve skin integrity, interaction with environment and functional independence  * Delirium prevention/treatment  * Manual techniques for edema management      Recommended Adaptive Equipment: Shower chair, AE as needed for ADLs    Home Living: Pateint lives with a roommate in a 2 story house with 4+ landing+5 steps without railing to enter. Bedroom/bathroom are located on the 2nd floor with flight of stairs with railing. Bathroom setup: Tub only, standard commode   Equipment owned: supplemental O2    Prior Level of Function: Patient is a questionable historian. I/mod I with ADLs. I/mod I with most IADLs. Patient completed functional mobility using no device. Patient on 6 L O2 at baseline. Driving: no  Occupation: Retired,       Pain Level: Patient reported neck/back pain but did not rate intensity, demonstrated no limitations with functional activities due to pain during session  Cognition: A&O: 3/4; Follows 1 step directions with continuous cues for re-direction to attend to task.  Patient easily distracted and impulsive throughout session limiting ability to participate  - Patient demonstrates decreased safety awareness   Memory:  Fair+   Sequencing:  Fair   Problem solving:  Fair-  Judgement/safety: Fair-     Functional Assessment:  AM-PAC Daily Activity Raw Score: 15/24   Initial Eval Status  Date: 8/4/21 Treatment Status  Date: STGs = LTGs  Time frame: 10-14 days   Feeding Set-up   Modified [place]    Grooming Contact Guard Assist   Washed hands standing at sink  Modified [place]    UB Dressing Minimal Assist   Modified [place]    LB Dressing Moderate Assist  CGA to doff/don sock seated edge of bed, anticipate increased assistance with additional tasks due to decreased activity tolerance and impaired safety awareness  Modified [place]   Using AE as needed   Bathing Moderate Assist  Modified [place]   Using AE as needed   Toileting Moderate Assist   Completed tolieting using standard commode  Modified [place]    Bed Mobility  Supine to sit: Stand by Assist   Sit to supine: Stand by Assist   Supine to sit: Modified [place]   Sit to supine: Modified [place]    Functional Transfers Contact Guard Assist   Modified [place]    Functional Mobility Contact Guard Assist   Completed bed<>bathroom without device, verbal cues for pursed lip breathing, pace, and safety awareness  Modified [place] using LRD  Functional household distance   Balance Sitting:     Static: Mod I    Dynamic: SBA  Standing:CGA     Activity Tolerance Fair  Fair+   Visual/  Perceptual Glasses: yes                Hand Dominance: R handed   AROM (PROM) Strength Additional Info:    RUE  WFL   : 4-/5 Fair+ FMC/dexterity noted during ADL tasks       LUE WFL   : 4-/5 Fair+ FMC/dexterity noted during ADL tasks       Hearing: WFL   Sensation:  No c/o numbness or tingling   Tone: WFL   Edema: none observed B UE    Comments: Nursing approved OT session. Upon arrival patient sitting edge of bed, agreeable to session. At end of session, patient sitting edge of bed with call light and phone within reach, all lines and tubes intact. Alarm on.   OT evaluation performed with education provided regarding the purpose and benefits of OT session, along with mobility and I/ADL completion. Overall patient demonstrated decreased safety awareness/impulsivity, decreased attention to tasks, weakness, and impaired activity tolerance. limiting independence and safety during completion of ADL/functional transfer/mobility tasks. Education provided regarding modified techniques for ADLs, along with functional mobility/transfers, to maximize patient safety and performance. Patient verbalized and demonstrated fair- understanding. Will continue to reinforce. Patient would benefit from continued skilled OT to increase safety and independence with completion of ADL/IADL tasks for functional independence and improved quality of life. Treatment: OT treatment provided this date includes:    Instruction/training on safety and adapted techniques for completion of ADLs: Patient complete hand hygiene standing at sink with CGA for balance and verbal cues for pursed lip breathing, safety awareness, and attention to task secondary to patient easily distracted.   Instruction/training on safe functional mobility/transfer techniques: Therapist assessed and modified environment to maximize safety and patient performance. CGA sit<>stand with verbal cues for technique and safety awareness. CGA to complete bed<>bathroom functional mobility without device requiring cues for pursed lip breathing, safety awareness with navigation of the environment, and pace due to impulsivity. Additional cues required throughout functional mobility to attend to task secondary to patient easily distracted.   Skilled monitoring of vitals:  Skilled monitoring of the patient's response throughout treatment.      SpO2 at rest 88-90%, SpO2 during activity 88% increasing to 92% with cues for pursed lip breathing, SpO2 at end of session 92% , patient on 15 L O2    Rehab Potential: Good  for established goals     Patient / Family Goal: To return home      Patient and/or family were instructed on functional diagnosis, prognosis/goals and OT plan of care. Demonstrated fair understanding. ·  Eval Complexity: Evaluation Complexity: Mod Complexity  ? History: Expanded review of medical records and additional review of physical, cognitive, or psychosocial history related to current functional performance  ? Exam: 5+ performance deficits  ? Assistance/Modification: mod/max assistance or modifications required to perform tasks. May have comorbidities that affect occupational performance. Time In/out: 11:15-11:22, physicians arriving for assessment with OT return at later time for completion of functional mobility/ADLs  Time In/out: 13:22-13:50  Total Treatment Time: 15 minutes    Min Units   OT Eval Low 20079       OT Eval Medium 97166  X 1   OT Eval High 20637       OT Re-Eval [id number]       Therapeutic Ex 85744       Therapeutic Activities 40321  10  1   ADL/Self Care 82651  5  0   Orthotic Management 40105       Neuro Re-Ed 05266       Non-Billable Time              Evaluation Time includes thorough review of current medical information, gathering information on past medical history/social history and prior level of function, completion of standardized testing/informal observation of tasks, assessment of data and education on plan of care and goals.             Toy Harris, OTD, OTR/L; #[id number]

## 2021-08-04 NOTE — PROGRESS NOTES
Momo Myers M.D.,[facility]  Romeo Gallardo D.O., F.A.C.O.I., Saeid Aguilar M.D. Osiel Almeida M.D. Robert Moura D.O. Daily Pulmonary Progress Note    Patient:  Andrae Richards 61 y.o. male MRN: [id number]     Date of Service: 8/4/2021      Synopsis     We are following patient for  COPD, home oxygen     \"CC\" SOB    Code status: FULL      Subjective      Patient was seen and examined. Sitting on edge of bed without distress, 92% on 15L HFNC. Patient states he wore his Bipap last night. He c/o a stuffy nose, he has nasal spray at bedside and will use that along with the humidified oxygen. Review of Systems:  Constitutional: Denies fever, weight loss, night sweats, and fatigue  Skin: Denies pigmentation, dark lesions, and rashes   HEENT: Denies hearing loss, tinnitus, ear drainage, epistaxis, sore throat, and hoarseness. Positive stuffy nose  Cardiovascular: Denies palpitations, chest pain, and chest pressure. Respiratory: Denies cough, dyspnea at rest, hemoptysis, apnea, and choking.   Gastrointestinal: Denies nausea, vomiting, poor appetite, diarrhea, heartburn or reflux  Genitourinary: Denies dysuria, frequency, urgency or hematuria  Musculoskeletal: Denies myalgias, muscle weakness, and bone pain  Neurological: Denies dizziness, vertigo, headache, and focal weakness  Psychological: Denies anxiety and depression  Endocrine: Denies heat intolerance and cold intolerance  Hematopoietic/Lymphatic: Denies bleeding problems and blood transfusions    24-hour events:  None     Objective   Vitals: /81   Pulse 68   Temp 96.8 °F (36 °C) (Temporal)   Resp 16   Ht 6' (1.829 m)   Wt (!) 360 lb (163.3 kg)   SpO2 92%   BMI 48.82 kg/m²     I/O:    Intake/Output Summary (Last 24 hours) at 8/4/2021 1235  Last data filed at 8/4/2021 0912  Gross per 24 hour   Intake 240 ml   Output 1000 ml   Net -760 ml       Vent Information  Skin Assessment: Clean, dry, & intact  FiO2 : 100 %  SpO2: 92 %  SpO2/FiO2 ratio: 97  I Time/ I Time %: 0.9 s  Mask Type: Full face mask  Mask Size: Medium       IPAP: 1 cmH20  CPAP/EPAP: 10 cmH2O     CURRENT MEDS :  Scheduled Meds:   furosemide  40 mg Intravenous Daily    amLODIPine  10 mg Oral Daily    aspirin  81 mg Oral Daily    atenolol  50 mg Oral Daily    atorvastatin  80 mg Oral Nightly    budesonide  0.5 mg Nebulization BID    mirtazapine  30 mg Oral Nightly    pantoprazole  40 mg Oral QAM AC    traZODone  400 mg Oral Nightly    sodium chloride flush  5-40 mL Intravenous 2 times per day    enoxaparin  40 mg Subcutaneous Daily    ipratropium-albuterol  1 ampule Inhalation Q4H WA    Arformoterol Tartrate  15 mcg Nebulization BID    insulin glargine  30 Units Subcutaneous BID    insulin lispro  11 Units Subcutaneous TID WC       Physical Exam:  General Appearance: appears comfortable in no acute distress. Obese  HEENT: Normocephalic atraumatic without obvious abnormality   Neck: Lips, mucosa, and tongue normal.  Supple, symmetrical, trachea midline, no adenopathy;thyroid:  no enlargement/tenderness/nodules or JVD. Lung: Breath sounds, CTA, diminished. Respirations unlabored. Symmetrical expansion. Heart: RRR, normal S1, S2. No MRG  Abdomen: Soft, NT, ND. BS present x 4 quadrants. No bruit or organomegaly. Rotund  Extremities: Pedal pulses 2+ symmetric b/l. Extremities normal, no cyanosis, clubbing. +1 BLE edema. Laceration under right 2nd toe  Musculokeletal: No joint swelling, no muscle tenderness. ROM normal in all joints of extremities. Neurologic: Mental status: Alert and Oriented X3 .     Pertinent/ New Labs and Imaging Studies     Imaging Personally Reviewed:  CTA pulmonary W contrast 8/2  The examination is technically limited due to suboptimal contrast   opacification and patient body habitus. Ferol Casa this limitation, there are no   filling defects in the main pulmonary artery and the central branches.  The   distal subsegmental and peripheral vessels are poorly opacified and cannot be   evaluated for distal subsegmental pulmonary embolism.  There is cardiomegaly. Moderately enlarged mediastinal lymph nodes are identified with lymph nodes   measuring up to 1.3 cm.  Trachea and major bronchi are patent.  There is   atelectasis and moderate pleural effusion in the lower lobes with   ground-glass opacities in the upper lobes.  2-4 mm nonspecific pulmonary   nodules are identified in the right upper lobe and right lower lobe without   change.  The liver is fatty infiltrated.  Degenerative changes are identified   in the thoracic spine       CXR 8/1  The cardiomediastinal silhouette is unremarkable.  The lungs   appear clear without consolidation.  No pleural effusion or   pneumothorax is seen.  Prominence of the perihilar pulmonary   vasculature without pulmonary vascular engorgement or interstitial   edema. Platelike atelectasis at the left costophrenic angle.       The osseous structures appear intact  with degenerative changes within   the thoracolumbar spine. ECHO 4/21/16   Summary   Technically difficult study. Definity contrast used for endocardial visualization. Left ventricular size is grossly normal.   Normal LV segmental wall motion. Ejection fraction is visually estimated at 65%. Left atrium is of normal size. Valves not well visualized in this study.       Signature      ----------------------------------------------------------------   Electronically signed by Vinton Hodgkin MD(Interpreting   physician) on 04/21/2016 04:33 PM      Labs:  Lab Results   Component Value Date    WBC 13.3 08/04/2021    HGB 13.6 08/04/2021    HCT 44.6 08/04/2021    MCV 89.7 08/04/2021    MCH 27.4 08/04/2021    MCHC 30.5 08/04/2021    RDW 15.2 08/04/2021     08/04/2021    MPV 11.6 08/04/2021     Lab Results   Component Value Date     08/04/2021    K 3.9 08/04/2021     08/04/2021    CO2 28 08/04/2021    BUN 22 08/04/2021    CREATININE 1.8 08/04/2021    LABALBU 3.6 08/02/2021    LABALBU 4.2 09/20/2011    CALCIUM 9.0 08/04/2021    GFRAA 47 08/04/2021    LABGLOM 39 08/04/2021     Lab Results   Component Value Date    PROTIME 13.0 02/22/2014    INR 1.2 02/22/2014     Recent Labs     08/02/21  1534   PROBNP 4,152*     Recent Labs     08/02/21  2304   PROCAL 0.10*     This SmartLink has not been configured with any valid records. Micro:  Respiratory panel negative     Assessment:    1. Acute on chronic respiratory failure with hypoxia and hypercapnia  2. Syncopal episode  3. Obesity hypoventilation syndrome   4. Restrictive ventilatory pattern, last PFTs 2014  5. Pulmonary hypertension WHO group 2  6. Small bilateral pleural effusions on CTA  7. Heart failure with preserved EF  8. ALEXIS treated with BiPAP, Chronic home O2 3-6L NC  9. COPD/emphysema  10. Morbid obesity BMI 48  11. Nicotine dependence in remission  12. Pulmonary nodule 2-4mm stable      Plan:   1. Oxygen therapy, 15L HFNC, titrate to keep SpO2 >88%  2. Bipap HS and PRN at home settings of 15/10. 3. Continue Brovana BID, continue Pulmicort BID and Duonebs Q4H WA  4. Repeat ECHO ordered with bubble study and R/O shunt  5. Nephrology consulted for CKD, volume overload by PCP  6. Awaiting dietician input for weight management and diet education  7. GI/DVT prophylaxis  8. Per Fleischner guidelines, no more follow-ups required for pulmonary nodule  9. Will need PFTs as outpatient       This plan of care was reviewed in collaboration with Dr. Rashaun Mcdaniels  Electronically signed by JENELLE Triplett CNP on 8/4/2021 at 12:35 PM     Addendum:  Guy Quiroz for saturations above 88%. Will Follow ECHO results. I personally saw, examined, and cared for the patient. Labs, medications, radiographs reviewed. I agree with history exam and plans detailed in NP note.   Suman Pike MD

## 2021-08-04 NOTE — PROGRESS NOTES
[address] [place] [address] Attending    S: 61 y.o. male with a history of chronic respiratory failure on 4-6 L of oxygen at home, COPD with bipap at night, htn, CKD 3b, Dm2 (a1c 6.3), CAD s/p stent, MDD, anxiety, gerd, bph, HFpEF, tobacco/remote cocaine use, obesity, and + hcv ab who presented after shopping in [facility] without the use of his oxygen. He walked to the car and he passed out. He had no cp, palpitations or seizure-like activity. He was short of breath before and walking throughout the store. He was told his lips were blue. Also with increasing dyspnea over the past few months. His oxygen saturation was80% on 4L in the ER. He also was found to have an ARLINE on CKD. He was placed on a NRB mask in the ER. This morning, he states his breathing is slightly improved since admission. Some diuresis. He reports remote use of IV cocaine, acid and stopped this 24 years ago. No use now. O: VS- Blood pressure 125/81, pulse 68, temperature 96.8 °F (36 °C), temperature source Temporal, resp. rate 16, height 6' (1.829 m), weight (!) 360 lb (163.3 kg), SpO2 92 %. Exam is as noted by resident with the following changes, additions or corrections:  Gen: NAD on 15 L NC  HEENT: NCAT, PERRL  CV-RRR no M/R/G   Lungs-rt basilar crackles  ABD-soft nonttp no masses   Ext-no C/C; 1+ pitting edema from his feet to his legs  Laceration between his second and third right toe    Impressions:   Principal Problem:    Shortness of breath  Active Problems: Morbid obesity    Emphysema/COPD    Hypertension    Diabetes mellitus type 2 in obese (HCC)    CKD (chronic kidney disease) stage 3, GFR 30-59 ml/min ([facility])    Depression    CAD (coronary artery disease)    Pneumonia    Foot laceration    Hypoxia  Resolved Problems:    * No resolved hospital problems. *      Plan:  Pt requiring increased oxygen with acute on chronic respiratory failure. CT with pleural effusions, doubt infection.  Increase Lasix, echo. Elevated BNP. Follow I and O for hx of HFpEF and suspected worsening along with the effusions/copd. Also on nebs. Consider changing b-blocker for Cad. Restart home insulin medications. Metformin on hold. Evaristo on ckd. Urine lytes show intrinsic etiology. Pt known to renal. Will consult. BP meds on hold. Will need tobacco cessation. PT/OT  Continue diuresis with strict I and Os     Attending Physician Statement  I have reviewed the chart and seen the patient with the resident(s). I personally reviewed images, EKG's and similar tests, if present. I personally reviewed and performed key elements of the history and exam.  I have reviewed and confirmed student and/or resident history and exam with changes as indicated above. I agree with the assessment, plan and orders as documented by the resident. Please refer to the resident and/or student note for additional information.       Kimberly Brandon MD

## 2021-08-04 NOTE — PROGRESS NOTES
Spoke with podiatry resident who called the floor, she wanted to make sure that FM wanted podiatry consult cancelled or re-consulted as she had already been up to see and esaual pt.'s foot

## 2021-08-04 NOTE — PROGRESS NOTES
denied dizziness with positional change. Patient on 15L/min O2 via HFNC and SpO2 monitored throughout and documented above. Patient with lacerations/scrapes to right knee and a laceration at base of plantar surface 4th/5th digit of right foot from recent fall. No c/o pain with mobility at either site. Patient assisted to standing and demonstrated wide stance, but steady. Patient with slower gait speed, wide stance with equal but shorter stride length and steady with short ambulation distance. Patient with increased SOB with minimal activity and assisted to seated EOB following gait. Once breathing under control, patient assisted back to semi Serrano's as found with call light and tray table in reach. Treatment:  Patient practiced and was instructed in the following treatment:    · Bed mobility: Verbal/tactile cues for sequencing BUEs/BLEs for safe technique with rolling/supine<>sit task. · Transfer training: Verbal/tactile cues to facilitate proper hand placement, technique and safety during sit to stand task. · Gait training: Verbal and tactile cues to facilitate upright posture and safety to complete task. · Therapeutic exercises: As noted above  · Neuromuscular education: NT    Pt's/ family goals   1. To feel better. Prognosis is good for reaching above PT goals. Patient and or family understand(s) diagnosis, prognosis, and plan of care.   Yes     PHYSICAL THERAPY PLAN OF CARE:    PT POC is established based on physician order and patient diagnosis     Referring provider/PT Order:    08/03/21 1600  PT eval and treat Start: 08/03/21 1600, End: 08/03/21 1600, ONE TIME, Standing Count: 1 Occurrences, R      Ross Arango MD     Diagnosis:  Pneumonia [J18.9]  Hypoxia [R09.02]  Specific instructions for next treatment:  Subsequent PT sessions with focus on improved mobility, ambulation distance    Current Treatment Recommendations:     [x] Strengthening to improve independence with functional mobility   [] ROM to improve independence with functional mobility   [x] Balance Training to improve static/dynamic balance and to reduce fall risk  [x] Endurance Training to improve activity tolerance during functional mobility   [x] Transfer Training to improve safety and independence with all functional transfers   [x] Gait Training to improve gait mechanics, endurance and asses need for appropriate assistive device  [x] Stair Training in preparation for safe discharge home and/or into the community   [x] Positioning to prevent skin breakdown and contractures  [x] Safety and Education Training   [x] Patient/Caregiver Education   [] HEP  [] Other     PT long term treatment goals are located in above grid    Frequency of treatments: 2-5x/week x 1-2 weeks. Time in  0950  Time out  1025    Total Treatment Time  25 minutes     Evaluation Time includes thorough review of current medical information, gathering information on past medical history/social history and prior level of function, completion of standardized testing/informal observation of tasks, assessment of data and education on plan of care and goals.     CPT codes:  [x] Low Complexity PT evaluation 39750  [] Moderate Complexity PT evaluation 38458  [] High Complexity PT evaluation 39850  [] PT Re-evaluation 06410  [] Gait training 21045 - minutes  [] Manual therapy 26832 - minutes  [x] Therapeutic activities 79870 25 minutes  [] Therapeutic exercises 66829 - minutes  [] Neuromuscular reeducation 20148 - minutes     Willie Garza PT, DPT  License [id number]

## 2021-08-04 NOTE — CONSULTS
Consult Note  NEPHROLOGY    Reason for Consult: Evaluation of acute on chronic kidney disease    Requesting Physician: Sandy Chavez MD    Chief Complaint:  Admitted w/ SOB    History Obtained From:  patient, electronic medical record    History of Present Ilness: This is a 61 y.o.  male with a H/O CKD stage III followed by Dr. Kulwant Hou of our practice who now presents to ED on 8/2/2021 with worsening fatigue, shortness of breath and syncope. Admission labs included Pro-BNP 4,152, Procalcitonin 0.10, sodium 142, potassium 5.0, CO2 26, creatinine 2.1, lactic acid 4.1, troponin 24, WBC 10.6. CT of head without contrast showed no skull fracture or acute intracranial abnormality. CTA of chest with contrast showed no central pulmonary embolism or aortic dissection. Chest x-ray showed cardiomegaly with moderate pleural effusion with atelectasis likely CHF and/or pneumonia. 2 -4 mm nonspecific pulmonary nodules. Patient was subsequently admitted for further evaluation and work-up. Currently upon exam pt is in no acute distress: Shirin Lucas also has a PMH of hypertension, COPD, sleep apnea, type 2 diabetes, CAD status post stent placement, depression, hyperlipidemia and GERD. Patient reports shopping this past Saturday when he began to feel short of breath while walking in the store. While returning to his car to retrieve his oxygen tank patient reports blacking out. He currently denies any chest pain, palpitations, dizziness, nausea, vomiting, diarrhea or illness at this time.       Past Medical History:        Diagnosis Date    Anxiety     Asthma     CAD (coronary artery disease) 2008    2 stents in 2008 .     CHF (congestive heart failure) (HCC)     Chills     Chronic sinusitis     Cocaine abuse ([facility] [address]     COPD (chronic obstructive pulmonary disease) (HCC)     Depression     Diabetes mellitus (HCC)     on insulin    GERD (gastroesophageal reflux disease)     H/O cardiovascular stress test     Hand fracture 3/2010    Fractured both hands.  Hepatitis C     Work up was negative. Saw specialist. Negative viral load.  History of tobacco abuse     Hyperlipidemia     Hypertension     Night sweats     NSTEMI (non-ST elevation myocardial infarction) ([facility] [address] 3/2010    Hospitalized.  Obesity     Oxygen dependent     Panic attacks     Pneumonia 3/2010     LLL.  Sleep apnea     Has CPAP machine    SOB (shortness of breath)     does not know settings       Past Surgical History:        Procedure Laterality Date    COLONOSCOPY  2011    COLONOSCOPY  9/23/15   [facility] CATH LAB PROCEDURE  2/25/2009    [facility], cardiac cath/primary BM stent in proximal LAD.     OTHER SURGICAL HISTORY      anal fistula    TONSILLECTOMY         Current Medications:    Current Facility-Administered Medications: perflutren lipid microspheres (DEFINITY) injection 1.65 mg, 1.5 mL, Intravenous, ONCE PRN  furosemide (LASIX) injection 40 mg, 40 mg, Intravenous, Daily  amLODIPine (NORVASC) tablet 10 mg, 10 mg, Oral, Daily  aspirin EC tablet 81 mg, 81 mg, Oral, Daily  atenolol (TENORMIN) tablet 50 mg, 50 mg, Oral, Daily  atorvastatin (LIPITOR) tablet 80 mg, 80 mg, Oral, Nightly  budesonide (PULMICORT) nebulizer suspension 500 mcg, 0.5 mg, Nebulization, BID  mirtazapine (REMERON) tablet 30 mg, 30 mg, Oral, Nightly  pantoprazole (PROTONIX) tablet 40 mg, 40 mg, Oral, QAM AC  traZODone (DESYREL) tablet 400 mg, 400 mg, Oral, Nightly  sodium chloride flush 0.9 % injection 5-40 mL, 5-40 mL, Intravenous, 2 times per day  sodium chloride flush 0.9 % injection 5-40 mL, 5-40 mL, Intravenous, PRN  0.9 % sodium chloride infusion, 25 mL, Intravenous, PRN  enoxaparin (LOVENOX) injection 40 mg, 40 mg, Subcutaneous, Daily  ondansetron (ZOFRAN-ODT) disintegrating tablet 4 mg, 4 mg, Oral, Q8H PRN **OR** ondansetron (ZOFRAN) injection 4 mg, 4 mg, Intravenous, Q6H PRN  polyethylene glycol (GLYCOLAX) packet 17 g, 17 g, Oral, Daily PRN  acetaminophen (TYLENOL) tablet 650 mg, 650 mg, Oral, Q6H PRN **OR** acetaminophen (TYLENOL) suppository 650 mg, 650 mg, Rectal, Q6H PRN  ipratropium-albuterol (DUONEB) nebulizer solution 1 ampule, 1 ampule, Inhalation, Q4H WA  hydrALAZINE (APRESOLINE) tablet 25 mg, 25 mg, Oral, Q8H PRN  Arformoterol Tartrate (BROVANA) nebulizer solution 15 mcg, 15 mcg, Nebulization, BID  glucose (GLUTOSE) 40 % oral gel 15 g, 15 g, Oral, PRN  dextrose 50 % IV solution, 12.5 g, Intravenous, PRN  glucagon (rDNA) injection 1 mg, 1 mg, Intramuscular, PRN  dextrose 5 % solution, 100 mL/hr, Intravenous, PRN  insulin glargine (LANTUS) injection vial 30 Units, 30 Units, Subcutaneous, BID  insulin lispro (HUMALOG) injection vial 11 Units, 11 Units, Subcutaneous, TID WC  glucose (GLUTOSE) 40 % oral gel 15 g, 15 g, Oral, PRN  dextrose 50 % IV solution, 12.5 g, Intravenous, PRN  glucagon (rDNA) injection 1 mg, 1 mg, Intramuscular, PRN  dextrose 5 % solution, 100 mL/hr, Intravenous, PRN    Allergies:  Amoxicillin and Penicillins    Social History:      reports that he quit smoking about 13 years ago. His smoking use included cigarettes. He started smoking about 41 years ago. He has a 76.00 pack-year smoking history. He has never used smokeless tobacco. He reports that he does not drink alcohol and does not use drugs. Family History:     Family History   Problem Relation Age of Onset    Heart Failure Father     Diabetes Mother     Heart Surgery Brother          Review of Systems:       Pertinent positives stated above in HPI. All other systems were reviewed and were negative.     Physical exam:   Constitutional:  VITALS:  /81   Pulse 68   Temp 96.8 °F (36 °C) (Temporal)   Resp 16   Ht 6' (1.829 m)   Wt (!) 360 lb (163.3 kg)   SpO2 92%   BMI 48.82 kg/m²   CURRENT TEMPERATURE:  Temp: 96.8 °F (36 °C)  CURRENT RESPIRATORY RATE:  Resp: 16  CURRENT PULSE:  Pulse: 68  CURRENT BLOOD PRESSURE:  BP: 125/81  24HR BLOOD PRESSURE RANGE: Systolic (91JEE), QDC:852 , Min:125 , ABX:248   ; Diastolic (00MWK), DLL:56, Min:77, Max:81    24HR INTAKE/OUTPUT:      Intake/Output Summary (Last 24 hours) at 8/4/2021 1253  Last data filed at 8/4/2021 5492  Gross per 24 hour   Intake 240 ml   Output 1000 ml   Net -760 ml     Gen: alert, awake, nad  Skin: no rash, turgor wnl  Heent:  eomi, mmm  Neck: No bruits or jvd noted  Cardiovascular:  S1, S2 without m/r/g  Respiratory: Distant but clear  Abdomen: Morbidly obese, soft  Ext: Trace lower extremity edema  Psychiatric: mood and affect appropriate  Musculoskeletal:  Rom, muscular strength intact    DATA:      CBC:   Lab Results   Component Value Date    WBC 13.3 08/04/2021    RBC 4.97 08/04/2021    HGB 13.6 08/04/2021    HCT 44.6 08/04/2021    MCV 89.7 08/04/2021    MCH 27.4 08/04/2021    MCHC 30.5 08/04/2021    RDW 15.2 08/04/2021     08/04/2021    MPV 11.6 08/04/2021     BMP:    Lab Results   Component Value Date     08/04/2021    K 3.9 08/04/2021     08/04/2021    CO2 28 08/04/2021    BUN 22 08/04/2021    LABALBU 3.6 08/02/2021    LABALBU 4.2 09/20/2011    CREATININE 1.8 08/04/2021    CALCIUM 9.0 08/04/2021    GFRAA 47 08/04/2021    LABGLOM 39 08/04/2021    GLUCOSE 162 08/04/2021    GLUCOSE 126 09/20/2011       RAD:  XR FOOT RIGHT (MIN 3 VIEWS)    Result Date: 8/2/2021  EXAMINATION: THREE XRAY VIEWS OF THE RIGHT FOOT 8/2/2021 3:02 pm COMPARISON: None. HISTORY: ORDERING SYSTEM PROVIDED HISTORY: foot wound TECHNOLOGIST PROVIDED HISTORY: Reason for exam:->foot wound What reading provider will be dictating this exam?->CRC FINDINGS: There is no evidence of acute fracture. There is normal alignment of the tarsometatarsal joints. No acute joint abnormality. No focal osseous lesion. Diffused edema the right foot. Nonspecific edema of the foot. No acute fractures or dislocations in the right foot.      CT HEAD WO CONTRAST    Result Date: 8/2/2021  EXAMINATION: CT OF THE HEAD WITHOUT CONTRAST 8/2/2021 5:23 pm TECHNIQUE: CT of the head was performed without the administration of intravenous contrast. Dose modulation, iterative reconstruction, and/or weight based adjustment of the mA/kV was utilized to reduce the radiation dose to as low as reasonably achievable. COMPARISON: None. HISTORY: ORDERING SYSTEM PROVIDED HISTORY: LOC TECHNOLOGIST PROVIDED HISTORY: Has a \"code stroke\" or \"stroke alert\" been called? ->No Reason for exam:->LOC Decision Support Exception - unselect if not a suspected or confirmed emergency medical condition->Emergency Medical Condition (MA) What reading provider will be dictating this exam?->CRC FINDINGS: BRAIN/VENTRICLES: No mass effect, edema or hemorrhage is seen. Mild volume loss is seen in the cerebrum with mild chronic microvascular ischemic changes. No hydrocephalus or extra-axial fluid is seen. ORBITS: The visualized portion of the orbits demonstrate no acute abnormality. SINUSES:  A mucous retention cyst is seen in the left maxillary sinus. Mild mucosal thickening in the ethmoid sinuses. The remainder of the visualized paranasal sinuses and the mastoids are grossly clear. SOFT TISSUES/SKULL:  No acute abnormality of the visualized skull or soft tissues. No skull fracture or acute intracranial abnormality. CTA PULMONARY W CONTRAST    Result Date: 8/2/2021  EXAMINATION: CTA OF THE CHEST 8/2/2021 5:23 pm TECHNIQUE: CTA of the chest was performed after the administration of intravenous contrast.  Multiplanar reformatted images are provided for review. MIP images are provided for review. Dose modulation, iterative reconstruction, and/or weight based adjustment of the mA/kV was utilized to reduce the radiation dose to as low as reasonably achievable. COMPARISON: Previous examination on the same day 3 of is prior.  HISTORY: ORDERING SYSTEM PROVIDED HISTORY: hypoxic TECHNOLOGIST PROVIDED HISTORY: Reason for exam:->hypoxic Decision Support Exception - unselect if not a suspected or confirmed emergency medical condition->Emergency Medical Condition (MA) What reading provider will be dictating this exam?->CRC FINDINGS: The examination is technically limited due to suboptimal contrast opacification and patient body habitus. Given this limitation, there are no filling defects in the main pulmonary artery and the central branches. The distal subsegmental and peripheral vessels are poorly opacified and cannot be evaluated for distal subsegmental pulmonary embolism. There is cardiomegaly. Moderately enlarged mediastinal lymph nodes are identified with lymph nodes measuring up to 1.3 cm. Trachea and major bronchi are patent. There is atelectasis and moderate pleural effusion in the lower lobes with ground-glass opacities in the upper lobes. 2-4 mm nonspecific pulmonary nodules are identified in the right upper lobe and right lower lobe without change. The liver is fatty infiltrated. Degenerative changes are identified in the thoracic spine. .     No central pulmonary embolism or aortic dissection. The distal subsegmental and peripheral vessels are poorly evaluated due to poor contrast. Cardiomegaly with moderate pleural effusion with atelectasis likely CHF and or pneumonia. 2-4 mm nonspecific pulmonary nodules. Consider surveillance according to Fleischner society guidelines. CT Lung Screen (Initial/Annual)    Result Date: 8/2/2021  EXAMINATION: LOW DOSE SCREENING CT OF THE CHEST WITHOUT CONTRAST 8/2/2021 1:24 pm TECHNIQUE: Low dose lung cancer screening CT of the chest was performed without the administration of intravenous contrast.  Multiplanar reformatted images are provided for review. Dose modulation, iterative reconstruction, and/or weight based adjustment of the mA/kV was utilized to reduce the radiation dose to as low as reasonably achievable.  COMPARISON: July 6, 2020 HISTORY: ORDERING SYSTEM PROVIDED HISTORY: Cigarette nicotine dependence in remission TECHNOLOGIST PROVIDED HISTORY: Age: 61 y.o. Smoking History: Social History Tobacco Use Smoking status: Former Smoker Packs/day: 2.00 Years: 38.00 Pack years: 68 Types: Cigarettes Start date:  Quit date: 2008 Years since quittin.9 Smokeless tobacco: Never Used Vaping Use Vaping Use: Never used Alcohol use: No Alcohol/week: 0.0 standard drinks Comment: was heavy drinker; quit age 35;  drinks 1-2 cups of coffee daily Drug use: No Comment: Acid, marijuana, cocaine, STOPPED ALL DRUGS  Pack years: 68 Last CT lung screen: 2020 Is there documentation of shared decision making? ->Yes Does the patient show any signs or symptoms of lung cancer? ->No Is this the first (baseline) CT or an annual exam?->Annual Is this a low dose CT or a routine CT?->Low Dose CT Smoking Status? ->Former Smoker Date quit smoking? (must be within 15 years)->08 Smoking packs per day?->2 FINDINGS: Mediastinum:  Evaluation of soft tissue structures is severely limited due to patient's body habitus and low-dose technique. There is an enlarged precarinal lymph node measuring 12 mm in short axis. This is new from the prior examination. An AP window lymph node is also at least mildly enlarged measuring 12 mm in short axis. Heart size is within normal limits. Faint coronary artery calcifications are noted, potentially a marker for coronary artery disease. The thoracic esophagus is decompressed, limiting assessment. No obvious esophageal abnormality. Lungs/Pleura: The central airways are patent. No evidence of a pneumothorax. There are small bilateral pleural effusions with bibasilar atelectasis. A 2 mm subpleural right upper lobe pulmonary nodule is noted on axial image 55. A 3-4 mm right middle lobe nodule on axial image 69 is stable. Upper Abdomen:  Evaluation of upper abdominal structures is essentially nondiagnostic due to severe photon starvation artifact. Soft Tissues/Bones: Evaluation of osseous structures is limited.   No convincing evidence of acute osseous abnormality. Significantly limited examination as described. There are small bilateral pleural effusions with bibasilar airspace opacities, most in keeping with compressive atelectasis. Several 2-4 mm right-sided pulmonary nodules are felt to be stable. New mediastinal lymphadenopathy, of uncertain etiology or clinical significance. Given these findings, correlation with diagnostic CT of the chest is recommended chest. LUNG RADS: Per ACR Lung-RADS Version 1.1 Category 2S, Benign appearance or behavior. Management:  Continue annual lung screening with LDCT in 12 months. RECOMMENDATIONS: If you would like to register your patient with the [place] [id number], please contact the Nurse Navigator at [phone]. Assessment/Plan      1. Acute on CKD Stage 3B  Baseline over the past 2 years 1.3-1.5  Admitting serum creatinine 2.1 has trended back to baseline levels  HCTZ and ARB held upon admission  No aggressive work-up planned as this patient is well-known to our practice  Follow daily labs and strict intake and output  Avoid NSAIDs    2. Shortness of breath  Acute on chronic respiratory failure  Followed by the pulmonary team  Currently on nasal cannula and BiPAP at night  Currently on Lasix 40 mg IV daily    3. Hypertension with CKD stage 1-4  Last recorded echo showed EF of 65% in March 2016: For repeat  BP at goal of less than 130/80 on current meds    4. Type II DM  Care per the primary team    5. Chronic kidney disease stage IIIb  Based on history of hypertension and diabetes  Baseline over the past 2 years 1.3-1.5        Thank you for allowing us to participate in care of Mr Poonam HendricksJENELLE CNP  8/4/2021  12:53 PM     Patient seen and examined all key components of the physical performed independently , case discussed with NP, all pertinent labs and radiologic tests personally reviewed agree with above. Salome Pinto MD

## 2021-08-04 NOTE — CONSULTS
cath/primary BM stent in proximal LAD.  OTHER SURGICAL HISTORY      anal fistula    TONSILLECTOMY     ·     Medications Prior to Admission:    · Medications Prior to Admission: traZODone (DESYREL) 100 MG tablet, take 4 tablets by mouth NIGHTLY  · blood glucose monitor strips, Test 3 times a day & as needed for symptoms of irregular blood glucose. Dispense sufficient amount for indicated testing frequency plus additional to accommodate PRN testing needs. · albuterol (PROVENTIL) (2.5 MG/3ML) 0.083% nebulizer solution, Take 3 mLs by nebulization 4 times daily inhale contents of 1 vial in nebulizer four times a day  · insulin aspart (NOVOLOG PENFILL) 100 UNIT/ML injection cartridge, Inject 20 Units into the skin daily  · insulin 70-30 (NOVOLIN 70/30) (70-30) 100 UNIT per ML injection vial, Inject 55 Units into the skin 2 times daily  · atorvastatin (LIPITOR) 80 MG tablet, take 1 tablet by mouth once daily  · finasteride (PROSCAR) 5 MG tablet, Take 1 tablet by mouth daily  · B-D INS SYR ULTRAFINE 1CC/30G 30G X 1/2\" 1 ML MISC, 1 each by Does not apply route 3 times daily  · amLODIPine (NORVASC) 10 MG tablet, take 1 tablet by mouth once daily  · omeprazole (PRILOSEC) 20 MG delayed release capsule, Take 1 capsule by mouth daily  · atenolol (TENORMIN) 50 MG tablet, take 1 tablet by mouth twice a day  · metFORMIN (GLUCOPHAGE) 1000 MG tablet, take 1 tablet by mouth twice a day with meals  · hydroCHLOROthiazide (HYDRODIURIL) 25 MG tablet, take 1 tablet by mouth once daily  · tamsulosin (FLOMAX) 0.4 MG capsule, take 1 capsule by mouth once daily  · Lancets MISC, Check sugars as directed.  [id number] [address] for pharmacy to substitute  · Insulin Syringes, Disposable, U-100 1 ML MISC, 1 each by Does not apply route daily  · losartan (COZAAR) 25 MG tablet, take 1 tablet by mouth once daily  · Blood Glucose Monitoring Suppl (ONE TOUCH ULTRA 2) w/Device KIT, 1 kit by Does not apply route daily  · blood glucose test strips (ASCENSIA AUTODISC VI;ONE TOUCH ULTRA TEST VI) strip, 1 each by Does not apply route 3 times daily  · docusate sodium (DOCQLACE) 100 MG capsule, Take 1 capsule by mouth 2 times daily  · mirtazapine (REMERON) 30 MG tablet, take 1 tablet by mouth every evening  · aspirin 81 MG EC tablet, Take 1 tablet by mouth daily  · Omega-3 Fatty Acids (FISH OIL) 1000 MG CAPS, Take 1 capsule by mouth daily  · Handicap Placard MISC, by Does not apply route  · glucose monitoring kit (FREESTYLE) monitoring kit, 1 kit by Does not apply route daily  · beclomethasone (QVAR) 80 MCG/ACT inhaler, Inhale 2 puffs into the lungs 2 times daily  · OXYGEN, Inhale 6 L into the lungs continuous   · Ipratropium-Albuterol (COMBIVENT IN), Inhale 2 puffs into the lungs 2 times daily   · gabapentin (NEURONTIN) 800 MG tablet, Take 1 tablet by mouth nightly for 30 days. · promethazine (PHENERGAN) 25 MG tablet, Take 1 tablet by mouth every 6 hours as needed for Nausea    Allergies:  Amoxicillin and Penicillins    Social History:   · TOBACCO:   reports that he quit smoking about 13 years ago. His smoking use included cigarettes. He started smoking about 41 years ago. He has a 76.00 pack-year smoking history. He has never used smokeless tobacco.  · ETOH:   reports no history of alcohol use. DRUGS:   Social History     Substance and Sexual Activity   Drug Use Yes    Types: IV    Comment: Acid, marijuana, cocaine, STOPPED ALL DRUGS 1993   ·     Family History:       Problem Relation Age of Onset    Heart Failure Father     Diabetes Mother     Heart Surgery Brother    ·       REVIEW OF SYSTEMS:    Al pertinent positives and negatives as noted in HPI       LOWER EXTREMITY EXAMINATION     VASCULAR:  DP and PT pulses are faint, possibly secondary to edema. CFT delayed B/L. Warm to warm from the tibial tuberosity to the distal aspect of the digits dorsally. No hair growth noted to the distal aspects dorsally.     NEUROLOGIC:  Protective sensation is diminished b/l    DERM: Laceration site noted to the medial aspect of the fourth right digit. Laceration does not probe to bone. Measures about 2.0x0.5x0.2cm. No noted pus or drainage. Does not tunnel. Appropriate active bleeding noted. No bone exposed. Ecchymosis noted to digits 2-4 on RLE. Edema noted to RLE. As pictured below:    MUSCULOSKELETAL: MMT Deferred. Equinus b/l                 CONSULTS:  IP CONSULT TO FAMILY MEDICINE  IP CONSULT TO PULMONOLOGY  IP CONSULT TO DIETITIAN  IP CONSULT TO NEPHROLOGY  IP CONSULT TO PODIATRY    MEDICATION:  Scheduled Meds:   furosemide  40 mg Intravenous Daily    amLODIPine  10 mg Oral Daily    aspirin  81 mg Oral Daily    atenolol  50 mg Oral Daily    atorvastatin  80 mg Oral Nightly    budesonide  0.5 mg Nebulization BID    mirtazapine  30 mg Oral Nightly    pantoprazole  40 mg Oral QAM AC    traZODone  400 mg Oral Nightly    sodium chloride flush  5-40 mL Intravenous 2 times per day    enoxaparin  40 mg Subcutaneous Daily    ipratropium-albuterol  1 ampule Inhalation Q4H WA    insulin glargine  30 Units Subcutaneous BID    insulin lispro  11 Units Subcutaneous TID WC     Continuous Infusions:   sodium chloride      dextrose      dextrose       PRN Meds:.perflutren lipid microspheres, sodium chloride flush, sodium chloride, ondansetron **OR** ondansetron, polyethylene glycol, acetaminophen **OR** acetaminophen, hydrALAZINE, glucose, dextrose, glucagon (rDNA), dextrose, glucose, dextrose, glucagon (rDNA), dextrose    RADIOLOGY:  CT HEAD WO CONTRAST   Final Result   No skull fracture or acute intracranial abnormality. CTA PULMONARY W CONTRAST   Final Result   No central pulmonary embolism or aortic dissection. The distal subsegmental   and peripheral vessels are poorly evaluated due to poor contrast.      Cardiomegaly with moderate pleural effusion with atelectasis likely CHF and   or pneumonia. 2-4 mm nonspecific pulmonary nodules.   Consider surveillance according to   Rewardable guidelines. XR FOOT RIGHT (MIN 3 VIEWS)   Final Result   Nonspecific edema of the foot. No acute fractures or dislocations in the right foot. XR CHEST (2 VW)    (Results Pending)       Vitals:    /72   Pulse 84   Temp 97.8 °F (36.6 °C) (Temporal)   Resp 16   Ht 6' (1.829 m)   Wt (!) 360 lb (163.3 kg)   SpO2 (!) 8%   BMI 48.82 kg/m²     LABS:   Recent Labs     08/02/21  1534 08/04/21  0644   WBC 10.6 13.3*   HGB 14.0 13.6   HCT 46.4 44.6    230     Recent Labs     08/04/21  0644      K 3.9      CO2 28   BUN 22   CREATININE 1.8*     Recent Labs     08/02/21  1534   PROT 6.3*       ASSESSMENTS:   1.RLE laceration -POA  2. RLE Trauma  3. RLE edema, pain  4. Falls  5. DM with neuropathic changes     Shortness of breath    Morbid obesity    Emphysema/COPD    Hypertension    Diabetes mellitus type 2 in obese ([facility])    CKD (chronic kidney disease) stage 3, GFR 30-59 ml/min ([facility])    Depression    CAD (coronary artery disease)    Pneumonia    Foot laceration    Hypoxia           PLAN:    - Patient was examined and evaluated. Reviewed patient's recent lab results and pertinent diagnostic imaging. Reviewed ancillary service notes. - Once patient was evaluated, laceration site was cleansed and betadine applied. A 2-0 Ethilon suture was used to close the laceration in the usual sterile fashion performed at bedside. 4-5 sutures applied. Approximated well. Laceration site was again cleansed and betadine and xeroform with DSD applied. - Antibiotics as per ID/IM   - X-rays: 1. Edema   2. No acute fractures or dislocations  - Prevalon boots ordered   - NWB to RLE   - Discussed patient with Dr. Lizet Conley   - Will continue to follow patient while they are in-house. Thank you for the opportunity to take part in the patient's care. Please do not hesitate to call for any questions or concerns.

## 2021-08-05 NOTE — CARE COORDINATION
Pt marya on 10LO2 at 88% per charting, pt lives with roommate and plan to return at discharge. O2 is through [address] DME per pt. Psychologist visited pt, pt to follow up with outpatient at [facility]. Mary Ace, MSW, LSW

## 2021-08-05 NOTE — BH NOTE
[id number] [facility] 149 Consultation   [address] [place] [address] Family Medicine    Reason for consult:   Patient seen at request of Family Medicine team due to concerns for mood/behavioral issues contributing to current medical status. Background:   Irma Bowie is a 61 y.o. male with a PMH of hypertension, COPD, sleep apnea, type 2 diabetes, CAD status post stent placement, depression, hyperlipidemia and GERD, admitted on 8/2/21 due to Loss of Consciousness and SOB after falling at a store while shopping. Pt reported intermittent depressed mood and low motivation. Stated that breathing difficulty has impaired daily functioning at home (e.g., preparing meals) and getting out of the house. States he is motivated only to take care of dog Rossy Mayberry). Has a friend who offers some practical support (Preeti Bal). Pt lives alone, single and no children. Denies SI/HI. Reported a history of polysubstance abuse, sober since 46. Reported he had problems with depressed mood and SI before reaching sobriety. Worked as long- prior to heart attack and 2 arm/wrist fractures in 2009. Stated he had lost some weight last summer when active fishing/boating. Psychotherapy Intervention:  Pt was candid in discussing low motivation and his health. Discussed what does motivate him (dog) and potential goals for improving his health status. Recommendations/Plan:  Recommend follow-up outpatient at [facility]. Pt will consider and schedule if interested in further evaluation/treatment.     Electronically signed by Adore Sheets, PhD

## 2021-08-05 NOTE — PROGRESS NOTES
[address] [place] [address] Attending    S: 61 y.o. male with a history of chronic respiratory failure on 4-6 L of oxygen at home, COPD with bipap at night, htn, CKD 3b, Dm2 (a1c 6.3), CAD s/p stent, MDD, anxiety, gerd, bph, HFpEF, tobacco/remote cocaine use, obesity, and + hcv ab who presented after shopping in [facility] without the use of his oxygen. He walked to the car and he passed out. He had no cp, palpitations or seizure-like activity. He was short of breath before and walking throughout the store. He was told his lips were blue. Also with increasing dyspnea over the past few months. His oxygen saturation was80% on 4L in the ER. He also was found to have an ARLINE on CKD. He was placed on a NRB mask in the ER. This morning, he states his breathing continues to improve. He has gotten up to a chair and been walking to the bathroom. O: VS- Blood pressure 128/71, pulse 72, temperature 97.2 °F (36.2 °C), temperature source Temporal, resp. rate 18, height 6' (1.829 m), weight (!) 360 lb (163.3 kg), SpO2 (!) 88 %. Exam is as noted by resident with the following changes, additions or corrections:  Gen: NAD on 10 L NC  HEENT: NCAT, PERRL  CV-RRR no M/R/G   Lungs-rt basilar crackles  ABD-soft nonttp no masses   Ext-no C/C; tr pitting edema from his feet to his legs, improved  Laceration between his second and third right toe    Impressions:   Principal Problem:    Shortness of breath  Active Problems: Morbid obesity    Emphysema/COPD    Hypertension    Diabetes mellitus type 2 in obese (HCC)    CKD (chronic kidney disease) stage 3, GFR 30-59 ml/min ([facility])    Depression    CAD (coronary artery disease)    Pneumonia    Foot laceration    Hypoxia  Resolved Problems:    * No resolved hospital problems. *      Plan:  Pt requiring increased oxygen with acute on chronic respiratory failure. CT with pleural effusions, doubt infection. Increase Lasix, echo pending. Elevated BNP.  Follow I and O for hx of HFpEF and suspected worsening along with the effusions/copd. Also on nebs. Consider changing b-blocker for Cad. Adjust insulin for bg. Metformin on hold. Evaristo on ckd. Urine lytes show intrinsic etiology. Appreciate renal consult. BP meds on hold. Will need tobacco cessation. PT/OT  Podiatry following pt and placed 4-5 sutures rt interdigital space  Follow up outpatient with Dr. Brandon Yun for depression       Attending Physician Statement  I have reviewed the chart and seen the patient with the resident(s). I personally reviewed images, EKG's and similar tests, if present. I personally reviewed and performed key elements of the history and exam.  I have reviewed and confirmed student and/or resident history and exam with changes as indicated above. I agree with the assessment, plan and orders as documented by the resident. Please refer to the resident and/or student note for additional information.       Hasmukh Pappas MD

## 2021-08-05 NOTE — PROGRESS NOTES
Anna Pineda M.D.,[facility] JYOTI Sheffield., FDEISIOMAME., Su Bautista M.D. Blane Peterson M.D. Staci Hernandez D.O. Daily Pulmonary Progress Note    Patient:  Peewee Antonio 61 y.o. male MRN: [id number]     Date of Service: 8/5/2021      Synopsis     We are following patient for  COPD, home oxygen     \"CC\" SOB    Code status: FULL      Subjective      Patient was seen and examined. Laying in bed without distress, 91% on 8L HFNC. Patient states he wore his Bipap last night. I brought an incentive spirometer into the room and educated patient on its use. Review of Systems:  Constitutional: Denies fever, weight loss, night sweats, and fatigue  Skin: Denies pigmentation, dark lesions, and rashes   HEENT: Denies hearing loss, tinnitus, ear drainage, epistaxis, sore throat, and hoarseness. Cardiovascular: Denies palpitations, chest pain, and chest pressure. Respiratory: Denies cough, dyspnea at rest, hemoptysis, apnea, and choking.   Gastrointestinal: Denies nausea, vomiting, poor appetite, diarrhea, heartburn or reflux  Genitourinary: Denies dysuria, frequency, urgency or hematuria  Musculoskeletal: Denies myalgias, muscle weakness, and bone pain  Neurological: Denies dizziness, vertigo, headache, and focal weakness  Psychological: Denies anxiety and depression  Endocrine: Denies heat intolerance and cold intolerance  Hematopoietic/Lymphatic: Denies bleeding problems and blood transfusions    24-hour events:  None     Objective   Vitals: /74   Pulse 72   Temp 97.1 °F (36.2 °C) (Temporal)   Resp 20   Ht 6' (1.829 m)   Wt (!) 360 lb (163.3 kg)   SpO2 (!) 85%   BMI 48.82 kg/m²     I/O:    Intake/Output Summary (Last 24 hours) at 8/5/2021 1415  Last data filed at 8/5/2021 0837  Gross per 24 hour   Intake 420 ml   Output 1350 ml   Net -930 ml       Vent Information  Skin Assessment: Clean, dry, & intact  FiO2 : 90 %  SpO2: (!) 85 %  SpO2/FiO2 ratio: 108.89  I Time/ I Time %: 0.9 s  Mask Type: Full face mask  Mask Size: Medium       IPAP: 1 cmH20  CPAP/EPAP: 10 cmH2O     CURRENT MEDS :  Scheduled Meds:   furosemide  40 mg Intravenous Daily    amLODIPine  10 mg Oral Daily    aspirin  81 mg Oral Daily    atenolol  50 mg Oral Daily    atorvastatin  80 mg Oral Nightly    budesonide  0.5 mg Nebulization BID    mirtazapine  30 mg Oral Nightly    pantoprazole  40 mg Oral QAM AC    traZODone  400 mg Oral Nightly    sodium chloride flush  5-40 mL Intravenous 2 times per day    enoxaparin  40 mg Subcutaneous Daily    ipratropium-albuterol  1 ampule Inhalation Q4H WA    insulin glargine  30 Units Subcutaneous BID    insulin lispro  11 Units Subcutaneous TID WC       Physical Exam:  General Appearance: appears comfortable in no acute distress. Obese  HEENT: Normocephalic atraumatic without obvious abnormality   Neck: Lips, mucosa, and tongue normal.  Supple, symmetrical, trachea midline, no adenopathy;thyroid:  no enlargement/tenderness/nodules or JVD. Lung: Breath sounds, CTA, diminished. Respirations unlabored. Symmetrical expansion. Heart: RRR, normal S1, S2. No MRG  Abdomen: Soft, NT, ND. BS present x 4 quadrants. No bruit or organomegaly. Rotund  Extremities: Pedal pulses 2+ symmetric b/l. Extremities normal, no cyanosis, clubbing. +1 BLE edema. Laceration under right 2nd toe  Musculokeletal: No joint swelling, no muscle tenderness. ROM normal in all joints of extremities. Neurologic: Mental status: Alert and Oriented X3 .     Pertinent/ New Labs and Imaging Studies     Imaging Personally Reviewed:  CTA pulmonary W contrast 8/2  The examination is technically limited due to suboptimal contrast   opacification and patient body habitus. Kennis Mcardle this limitation, there are no   filling defects in the main pulmonary artery and the central branches.  The   distal subsegmental and peripheral vessels are poorly opacified and cannot be   evaluated for distal subsegmental pulmonary embolism. Carlene Contes is cardiomegaly. Moderately enlarged mediastinal lymph nodes are identified with lymph nodes   measuring up to 1.3 cm.  Trachea and major bronchi are patent.  There is   atelectasis and moderate pleural effusion in the lower lobes with   ground-glass opacities in the upper lobes.  2-4 mm nonspecific pulmonary   nodules are identified in the right upper lobe and right lower lobe without   change.  The liver is fatty infiltrated.  Degenerative changes are identified   in the thoracic spine       CXR 8/1  The cardiomediastinal silhouette is unremarkable.  The lungs   appear clear without consolidation.  No pleural effusion or   pneumothorax is seen.  Prominence of the perihilar pulmonary   vasculature without pulmonary vascular engorgement or interstitial   edema. Platelike atelectasis at the left costophrenic angle.       The osseous structures appear intact  with degenerative changes within   the thoracolumbar spine. ECHO 4/21/16   Summary   Technically difficult study. Definity contrast used for endocardial visualization. Left ventricular size is grossly normal.   Normal LV segmental wall motion. Ejection fraction is visually estimated at 65%. Left atrium is of normal size. Valves not well visualized in this study.       Signature      ----------------------------------------------------------------   Electronically signed by Goldie Velazquez MD(Interpreting   physician) on 04/21/2016 04:33 PM      Labs:  Lab Results   Component Value Date    WBC 10.4 08/05/2021    HGB 13.1 08/05/2021    HCT 43.0 08/05/2021    MCV 89.6 08/05/2021    MCH 27.3 08/05/2021    MCHC 30.5 08/05/2021    RDW 15.2 08/05/2021     08/05/2021    MPV 11.3 08/05/2021     Lab Results   Component Value Date     08/05/2021    K 4.1 08/05/2021     08/05/2021    CO2 30 08/05/2021    BUN 23 08/05/2021    CREATININE 1.8 08/05/2021    LABALBU 3.6 08/02/2021    LABALBU 4.2 09/20/2011    CALCIUM 8.8 08/05/2021    GFRAA 47 08/05/2021    LABGLOM 39 08/05/2021     Lab Results   Component Value Date    PROTIME 13.0 02/22/2014    INR 1.2 02/22/2014     Recent Labs     08/02/21  1534   PROBNP 4,152*     Recent Labs     08/02/21  2304   PROCAL 0.10*     This SmartLink has not been configured with any valid records. Micro:  Respiratory panel negative     Assessment:    1. Acute on chronic respiratory failure with hypoxia and hypercapnia  2. Syncopal episode  3. Obesity hypoventilation syndrome   4. Restrictive ventilatory pattern, last PFTs 2014  5. Pulmonary hypertension WHO group 2  6. Small bilateral pleural effusions on CTA  7. Heart failure with preserved EF  8. ALEXIS treated with BiPAP, Chronic home O2 3-6L NC  9. COPD/emphysema  10. Morbid obesity BMI 48  11. Nicotine dependence in remission  12. Pulmonary nodule 2-4mm stable      Plan:   1. Oxygen therapy, 8L HFNC, titrate to keep SpO2 >88%  2. Bipap HS and PRN at home settings of 15/10. 3. Recruitment techniques, incentive spirometer  4. Continue Brovana BID, continue Pulmicort BID and Duonebs Q4H WA  5. Repeat ECHO with bubble study and R/O shunt to be done today  6. Lasix 40 mg IV daily per nephrology. 1.3L output in 24 hours  7. Awaiting dietician input for weight management and diet education  8. GI/DVT prophylaxis  9. Per Fleischner guidelines, no more follow-ups required for pulmonary nodule  10. Will need PFTs as outpatient       This plan of care was reviewed in collaboration with Dr. Catrachita Case  Electronically signed by JENELLE Michael CNP on 8/5/2021 at 2:15 PM     I personally saw, examined, and cared for the patient. Labs, medications, radiographs reviewed. I agree with history exam and plans detailed in NP note.   Dayron Zavala MD

## 2021-08-05 NOTE — PROGRESS NOTES
Wound care: Podiatry consulted and managing left 4th toe wound, unable to do skin assessment due to patient having a procedure at bedside. Will attempt to see at a later time.  Mica Ochoa RN

## 2021-08-05 NOTE — PROGRESS NOTES
Progress Note  NEPHROLOGY    Reason for Consult: Evaluation of acute on chronic kidney disease    From 8/4/21 Consult note - History of Present Ilness: This is a 61 y.o.  male with a H/O CKD stage III followed by Dr. Bert aMllory of our practice who now presents to ED on 8/2/2021 with worsening fatigue, shortness of breath and syncope. Admission labs included Pro-BNP 4,152, Procalcitonin 0.10, sodium 142, potassium 5.0, CO2 26, creatinine 2.1, lactic acid 4.1, troponin 24, WBC 10.6. CT of head without contrast showed no skull fracture or acute intracranial abnormality. CTA of chest with contrast showed no central pulmonary embolism or aortic dissection. Chest x-ray showed cardiomegaly with moderate pleural effusion with atelectasis likely CHF and/or pneumonia. 2 -4 mm nonspecific pulmonary nodules. Patient was subsequently admitted for further evaluation and work-up. Interval History:   8/5/21:Resting in bed, states he feels ok, no shortness of breath today. Past Medical History:        Diagnosis Date    Anxiety     Asthma     CAD (coronary artery disease) 2008    2 stents in 2008 .  CHF (congestive heart failure) (HCC)     Chills     Chronic sinusitis     Cocaine abuse ([address]     COPD (chronic obstructive pulmonary disease) (HCC)     Depression     Diabetes mellitus (HCC)     on insulin    GERD (gastroesophageal reflux disease)     H/O cardiovascular stress test     Hand fracture 3/2010    Fractured both hands.  Hepatitis C     Work up was negative. Saw specialist. Negative viral load.  History of tobacco abuse     Hyperlipidemia     Hypertension     Night sweats     NSTEMI (non-ST elevation myocardial infarction) ([address] 3/2010    Hospitalized.  Obesity     Oxygen dependent     Panic attacks     Pneumonia 3/2010     LLL.     Sleep apnea     Has CPAP machine    SOB (shortness of breath)     does not know settings       Past Surgical History:        Procedure Laterality Date    COLONOSCOPY  2011    COLONOSCOPY  9/23/15   [facility] CATH LAB PROCEDURE  2/25/2009    [facility], cardiac cath/primary BM stent in proximal LAD.     OTHER SURGICAL HISTORY      anal fistula    TONSILLECTOMY         Current Medications:    Current Facility-Administered Medications: perflutren lipid microspheres (DEFINITY) injection 1.65 mg, 1.5 mL, Intravenous, ONCE PRN  furosemide (LASIX) injection 40 mg, 40 mg, Intravenous, Daily  amLODIPine (NORVASC) tablet 10 mg, 10 mg, Oral, Daily  aspirin EC tablet 81 mg, 81 mg, Oral, Daily  atenolol (TENORMIN) tablet 50 mg, 50 mg, Oral, Daily  atorvastatin (LIPITOR) tablet 80 mg, 80 mg, Oral, Nightly  budesonide (PULMICORT) nebulizer suspension 500 mcg, 0.5 mg, Nebulization, BID  mirtazapine (REMERON) tablet 30 mg, 30 mg, Oral, Nightly  pantoprazole (PROTONIX) tablet 40 mg, 40 mg, Oral, QAM AC  traZODone (DESYREL) tablet 400 mg, 400 mg, Oral, Nightly  sodium chloride flush 0.9 % injection 5-40 mL, 5-40 mL, Intravenous, 2 times per day  sodium chloride flush 0.9 % injection 5-40 mL, 5-40 mL, Intravenous, PRN  0.9 % sodium chloride infusion, 25 mL, Intravenous, PRN  enoxaparin (LOVENOX) injection 40 mg, 40 mg, Subcutaneous, Daily  ondansetron (ZOFRAN-ODT) disintegrating tablet 4 mg, 4 mg, Oral, Q8H PRN **OR** ondansetron (ZOFRAN) injection 4 mg, 4 mg, Intravenous, Q6H PRN  polyethylene glycol (GLYCOLAX) packet 17 g, 17 g, Oral, Daily PRN  acetaminophen (TYLENOL) tablet 650 mg, 650 mg, Oral, Q6H PRN **OR** acetaminophen (TYLENOL) suppository 650 mg, 650 mg, Rectal, Q6H PRN  ipratropium-albuterol (DUONEB) nebulizer solution 1 ampule, 1 ampule, Inhalation, Q4H WA  hydrALAZINE (APRESOLINE) tablet 25 mg, 25 mg, Oral, Q8H PRN  glucose (GLUTOSE) 40 % oral gel 15 g, 15 g, Oral, PRN  dextrose 50 % IV solution, 12.5 g, Intravenous, PRN  glucagon (rDNA) injection 1 mg, 1 mg, Intramuscular, PRN  dextrose 5 % solution, 100 mL/hr, Intravenous, PRN  insulin glargine (LANTUS) injection vial 30 Units, 30 Units, Subcutaneous, BID  insulin lispro (HUMALOG) injection vial 11 Units, 11 Units, Subcutaneous, TID WC  glucose (GLUTOSE) 40 % oral gel 15 g, 15 g, Oral, PRN  dextrose 50 % IV solution, 12.5 g, Intravenous, PRN  glucagon (rDNA) injection 1 mg, 1 mg, Intramuscular, PRN  dextrose 5 % solution, 100 mL/hr, Intravenous, PRN    Allergies:  Amoxicillin and Penicillins    Social History:      reports that he quit smoking about 13 years ago. His smoking use included cigarettes. He started smoking about 41 years ago. He has a 76.00 pack-year smoking history. He has never used smokeless tobacco. He reports that he does not drink alcohol and does not use drugs. Family History:     Family History   Problem Relation Age of Onset    Heart Failure Father     Diabetes Mother     Heart Surgery Brother          Review of Systems:       Pertinent positives stated above in HPI. All other systems were reviewed and were negative.     Physical exam:   Constitutional:  VITALS:  /83   Pulse 58   Temp 95.9 °F (35.5 °C) (Temporal)   Resp 15   Ht 6' (1.829 m)   Wt (!) 360 lb (163.3 kg)   SpO2 98%   BMI 48.82 kg/m²   CURRENT TEMPERATURE:  Temp: 95.9 °F (35.5 °C)  CURRENT RESPIRATORY RATE:  Resp: 15  CURRENT PULSE:  Pulse: 58  CURRENT BLOOD PRESSURE:  BP: 135/83  24HR BLOOD PRESSURE RANGE:  Systolic (66BCF), REF:469 , Min:127 , PUX:792   ; Diastolic (10OTQ), DVU:16, Min:72, Max:85    24HR INTAKE/OUTPUT:      Intake/Output Summary (Last 24 hours) at 8/5/2021 6323  Last data filed at 8/5/2021 0249  Gross per 24 hour   Intake 600 ml   Output 1350 ml   Net -750 ml       General appearance: alert, in no acute distress  Skin: No rashes or lesions on exposed skin  Neck: No JVD  Lungs: Clear, no adventitious sounds  Heart: RRR, no rub  Abdomen: Soft, non-tender, + bowel sounds  Extremities: Trace BLE edema, dressing to right foot  Neurologic: Mental status: Alert, oriented, thought content appropriate      DATA:      CBC:   Lab Results   Component Value Date    WBC 10.4 08/05/2021    RBC 4.80 08/05/2021    HGB 13.1 08/05/2021    HCT 43.0 08/05/2021    MCV 89.6 08/05/2021    MCH 27.3 08/05/2021    MCHC 30.5 08/05/2021    RDW 15.2 08/05/2021     08/05/2021    MPV 11.3 08/05/2021     BMP:    Lab Results   Component Value Date     08/05/2021    K 4.1 08/05/2021     08/05/2021    CO2 30 08/05/2021    BUN 23 08/05/2021    LABALBU 3.6 08/02/2021    LABALBU 4.2 09/20/2011    CREATININE 1.8 08/05/2021    CALCIUM 8.8 08/05/2021    GFRAA 47 08/05/2021    LABGLOM 39 08/05/2021    GLUCOSE 143 08/05/2021    GLUCOSE 126 09/20/2011       RAD:  XR FOOT RIGHT (MIN 3 VIEWS)    Result Date: 8/2/2021  EXAMINATION: THREE XRAY VIEWS OF THE RIGHT FOOT 8/2/2021 3:02 pm COMPARISON: None. HISTORY: ORDERING SYSTEM PROVIDED HISTORY: foot wound TECHNOLOGIST PROVIDED HISTORY: Reason for exam:->foot wound What reading provider will be dictating this exam?->CRC FINDINGS: There is no evidence of acute fracture. There is normal alignment of the tarsometatarsal joints. No acute joint abnormality. No focal osseous lesion. Diffused edema the right foot. Nonspecific edema of the foot. No acute fractures or dislocations in the right foot. CT HEAD WO CONTRAST    Result Date: 8/2/2021  EXAMINATION: CT OF THE HEAD WITHOUT CONTRAST  8/2/2021 5:23 pm TECHNIQUE: CT of the head was performed without the administration of intravenous contrast. Dose modulation, iterative reconstruction, and/or weight based adjustment of the mA/kV was utilized to reduce the radiation dose to as low as reasonably achievable. COMPARISON: None. HISTORY: ORDERING SYSTEM PROVIDED HISTORY: LOC TECHNOLOGIST PROVIDED HISTORY: Has a \"code stroke\" or \"stroke alert\" been called? ->No Reason for exam:->LOC Decision Support Exception - unselect if not a suspected or confirmed emergency medical condition->Emergency Medical Condition (MA) What reading provider will be dictating this exam?->CRC FINDINGS: BRAIN/VENTRICLES: No mass effect, edema or hemorrhage is seen. Mild volume loss is seen in the cerebrum with mild chronic microvascular ischemic changes. No hydrocephalus or extra-axial fluid is seen. ORBITS: The visualized portion of the orbits demonstrate no acute abnormality. SINUSES:  A mucous retention cyst is seen in the left maxillary sinus. Mild mucosal thickening in the ethmoid sinuses. The remainder of the visualized paranasal sinuses and the mastoids are grossly clear. SOFT TISSUES/SKULL:  No acute abnormality of the visualized skull or soft tissues. No skull fracture or acute intracranial abnormality. CTA PULMONARY W CONTRAST    Result Date: 8/2/2021  EXAMINATION: CTA OF THE CHEST 8/2/2021 5:23 pm TECHNIQUE: CTA of the chest was performed after the administration of intravenous contrast.  Multiplanar reformatted images are provided for review. MIP images are provided for review. Dose modulation, iterative reconstruction, and/or weight based adjustment of the mA/kV was utilized to reduce the radiation dose to as low as reasonably achievable. COMPARISON: Previous examination on the same day 3 of is prior. HISTORY: ORDERING SYSTEM PROVIDED HISTORY: hypoxic TECHNOLOGIST PROVIDED HISTORY: Reason for exam:->hypoxic Decision Support Exception - unselect if not a suspected or confirmed emergency medical condition->Emergency Medical Condition (MA) What reading provider will be dictating this exam?->CRC FINDINGS: The examination is technically limited due to suboptimal contrast opacification and patient body habitus. Given this limitation, there are no filling defects in the main pulmonary artery and the central branches. The distal subsegmental and peripheral vessels are poorly opacified and cannot be evaluated for distal subsegmental pulmonary embolism. There is cardiomegaly.  Moderately enlarged mediastinal lymph nodes are identified with lymph nodes measuring up to 1.3 cm. Trachea and major bronchi are patent. There is atelectasis and moderate pleural effusion in the lower lobes with ground-glass opacities in the upper lobes. 2-4 mm nonspecific pulmonary nodules are identified in the right upper lobe and right lower lobe without change. The liver is fatty infiltrated. Degenerative changes are identified in the thoracic spine. .     No central pulmonary embolism or aortic dissection. The distal subsegmental and peripheral vessels are poorly evaluated due to poor contrast. Cardiomegaly with moderate pleural effusion with atelectasis likely CHF and or pneumonia. 2-4 mm nonspecific pulmonary nodules. Consider surveillance according to Fleischner society guidelines. CT Lung Screen (Initial/Annual)    Result Date: 2021  EXAMINATION: LOW DOSE SCREENING CT OF THE CHEST WITHOUT CONTRAST 2021 1:24 pm TECHNIQUE: Low dose lung cancer screening CT of the chest was performed without the administration of intravenous contrast.  Multiplanar reformatted images are provided for review. Dose modulation, iterative reconstruction, and/or weight based adjustment of the mA/kV was utilized to reduce the radiation dose to as low as reasonably achievable. COMPARISON: 2020 HISTORY: ORDERING SYSTEM PROVIDED HISTORY: Cigarette nicotine dependence in remission TECHNOLOGIST PROVIDED HISTORY: Age: 61 y.o. Smoking History: Social History Tobacco Use Smoking status: Former Smoker Packs/day: 2.00 Years: 38.00 Pack years: 68 Types: Cigarettes Start date:  Quit date: 2008 Years since quittin.9 Smokeless tobacco: Never Used Vaping Use Vaping Use: Never used Alcohol use: No Alcohol/week: 0.0 standard drinks Comment: was heavy drinker; quit age 35;  drinks 1-2 cups of coffee daily Drug use: No Comment: Acid, marijuana, cocaine, STOPPED ALL DRUGS  Pack years: 68 Last CT lung screen: 2020 Is there documentation of shared decision making? ->Yes Does the patient show any signs or symptoms of lung cancer? ->No Is this the first (baseline) CT or an annual exam?->Annual Is this a low dose CT or a routine CT?->Low Dose CT Smoking Status? ->Former Smoker Date quit smoking? (must be within 15 years)->8/5/08 Smoking packs per day?->2 FINDINGS: Mediastinum:  Evaluation of soft tissue structures is severely limited due to patient's body habitus and low-dose technique. There is an enlarged precarinal lymph node measuring 12 mm in short axis. This is new from the prior examination. An AP window lymph node is also at least mildly enlarged measuring 12 mm in short axis. Heart size is within normal limits. Faint coronary artery calcifications are noted, potentially a marker for coronary artery disease. The thoracic esophagus is decompressed, limiting assessment. No obvious esophageal abnormality. Lungs/Pleura: The central airways are patent. No evidence of a pneumothorax. There are small bilateral pleural effusions with bibasilar atelectasis. A 2 mm subpleural right upper lobe pulmonary nodule is noted on axial image 55. A 3-4 mm right middle lobe nodule on axial image 69 is stable. Upper Abdomen:  Evaluation of upper abdominal structures is essentially nondiagnostic due to severe photon starvation artifact. Soft Tissues/Bones: Evaluation of osseous structures is limited. No convincing evidence of acute osseous abnormality. Significantly limited examination as described. There are small bilateral pleural effusions with bibasilar airspace opacities, most in keeping with compressive atelectasis. Several 2-4 mm right-sided pulmonary nodules are felt to be stable. New mediastinal lymphadenopathy, of uncertain etiology or clinical significance. Given these findings, correlation with diagnostic CT of the chest is recommended chest. LUNG RADS: Per ACR Lung-RADS Version 1.1 Category 2S, Benign appearance or behavior.  Management:  Continue annual lung screening with LDCT in 12 months. RECOMMENDATIONS: If you would like to register your patient with the [place] [address], please contact the Nurse Navigator at [phone]. Assessment/Plan      1. Acute on CKD Stage 3B  Baseline over the past 2 years 1.3-1.5  Admitting serum creatinine 2.1  HCTZ and ARB held upon admission  No aggressive work-up planned as this patient is well-known to our practice  Follow daily labs and strict intake and output  Avoid NSAIDs  8/5 Cr 1.8    2. Shortness of breath  Acute on chronic respiratory failure  Followed by the pulmonary team  Currently on nasal cannula and BiPAP at night  Currently on Lasix 40 mg IV daily  UO last 24 hrs 1350 ml    3. Hypertension with CKD stage 1-4  Last recorded echo showed EF of 65% in March 2016: For repeat  BP at goal of less than 130/80 on current meds    4. Type II DM  Care per the primary team    5. Chronic kidney disease stage IIIb  Based on history of hypertension and diabetes  Baseline over the past 2 years 1.3-1.5      Thank you for allowing us to participate in care of Mr Ngozi Ruiz, JENELLE - CNS  8/5/2021  7:18 AM     Patient seen and examined all key components of the physical performed independently , case discussed with NP, all pertinent labs and radiologic tests personally reviewed agree with above.     Oxygen requirements increasing again; more SOB; peripheral edema still significant  Will increase diuretics  Lacey Craft MD

## 2021-08-05 NOTE — PROGRESS NOTES
Department of Podiatry  Progress Note    SUBJECTIVE:  Mr. Lizarraga Friday was evaluated at bedside this morning for RLE fourth digit laceration. No acute events overnight. Patient denies any N/V/D/F/C/SOB/CP and has no other pedal complaints at this time. OBJECTIVE:    Scheduled Meds:   furosemide  40 mg Intravenous Daily    amLODIPine  10 mg Oral Daily    aspirin  81 mg Oral Daily    atenolol  50 mg Oral Daily    atorvastatin  80 mg Oral Nightly    budesonide  0.5 mg Nebulization BID    mirtazapine  30 mg Oral Nightly    pantoprazole  40 mg Oral QAM AC    traZODone  400 mg Oral Nightly    sodium chloride flush  5-40 mL Intravenous 2 times per day    enoxaparin  40 mg Subcutaneous Daily    ipratropium-albuterol  1 ampule Inhalation Q4H WA    insulin glargine  30 Units Subcutaneous BID    insulin lispro  11 Units Subcutaneous TID WC     Continuous Infusions:   sodium chloride      dextrose      dextrose       PRN Meds:.perflutren lipid microspheres, sodium chloride flush, sodium chloride, ondansetron **OR** ondansetron, polyethylene glycol, acetaminophen **OR** acetaminophen, hydrALAZINE, glucose, dextrose, glucagon (rDNA), dextrose, glucose, dextrose, glucagon (rDNA), dextrose    Allergies   Allergen Reactions    Amoxicillin Hives and Shortness Of Breath    Penicillins Hives and Shortness Of Breath     TRIAMOX       /83   Pulse 58   Temp 95.9 °F (35.5 °C) (Temporal)   Resp 15   Ht 6' (1.829 m)   Wt (!) 360 lb (163.3 kg)   SpO2 91%   BMI 48.82 kg/m²       EXAM:  -Dressings this morning were clean, dry and intact without strike-through. -No noted dishevelment.   -No posterior calf pain on palpation. Able to move digits without pain.   -Asked to comply with RICE    PHYSICAL EXAM AS OF 8/4/21:  VASCULAR:  DP and PT pulses are faint, possibly secondary to edema. CFT delayed B/L. Warm to warm from the tibial tuberosity to the distal aspect of the digits dorsally.  No hair growth noted to the distal aspects dorsally.     NEUROLOGIC:  Protective sensation is diminished b/l     DERM:  Laceration site noted to the medial aspect of the fourth right digit. Laceration does not probe to bone. Measures about 2.0x0.5x0.2cm. No noted pus or drainage. Does not tunnel. Appropriate active bleeding noted. No bone exposed. Ecchymosis noted to digits 2-4 on RLE. Edema noted to RLE. As pictured below:     MUSCULOSKELETAL: MMT Deferred. Equinus b/l   Scheduled Meds:   furosemide  40 mg Intravenous Daily    amLODIPine  10 mg Oral Daily    aspirin  81 mg Oral Daily    atenolol  50 mg Oral Daily    atorvastatin  80 mg Oral Nightly    budesonide  0.5 mg Nebulization BID    mirtazapine  30 mg Oral Nightly    pantoprazole  40 mg Oral QAM AC    traZODone  400 mg Oral Nightly    sodium chloride flush  5-40 mL Intravenous 2 times per day    enoxaparin  40 mg Subcutaneous Daily    ipratropium-albuterol  1 ampule Inhalation Q4H WA    insulin glargine  30 Units Subcutaneous BID    insulin lispro  11 Units Subcutaneous TID WC     Continuous Infusions:   sodium chloride      dextrose      dextrose       PRN Meds:.perflutren lipid microspheres, sodium chloride flush, sodium chloride, ondansetron **OR** ondansetron, polyethylene glycol, acetaminophen **OR** acetaminophen, hydrALAZINE, glucose, dextrose, glucagon (rDNA), dextrose, glucose, dextrose, glucagon (rDNA), dextrose    RADIOLOGY:  CT HEAD WO CONTRAST   Final Result   No skull fracture or acute intracranial abnormality. CTA PULMONARY W CONTRAST   Final Result   No central pulmonary embolism or aortic dissection. The distal subsegmental   and peripheral vessels are poorly evaluated due to poor contrast.      Cardiomegaly with moderate pleural effusion with atelectasis likely CHF and   or pneumonia. 2-4 mm nonspecific pulmonary nodules. Consider surveillance according to   Fleischner society guidelines.          XR FOOT RIGHT (MIN 3 VIEWS)   Final Result   Nonspecific edema of the foot. No acute fractures or dislocations in the right foot. XR CHEST (2 VW)    (Results Pending)     /83   Pulse 58   Temp 95.9 °F (35.5 °C) (Temporal)   Resp 15   Ht 6' (1.829 m)   Wt (!) 360 lb (163.3 kg)   SpO2 91%   BMI 48.82 kg/m²     LABS:    Recent Labs     08/04/21  0644 08/05/21  0548   WBC 13.3* 10.4   HGB 13.6 13.1   HCT 44.6 43.0    198        Recent Labs     08/05/21  0548      K 4.1      CO2 30*   BUN 23   CREATININE 1.8*        Recent Labs     08/02/21  1534   PROT 6.3*         ASSESSMENT:    1. RLE laceration -POA  2. RLE Trauma  3. RLE edema, pain  4. Falls  5. DM with neuropathic changes     Shortness of breath    Morbid obesity    Emphysema/COPD    Hypertension    Diabetes mellitus type 2 in obese ([facility])    CKD (chronic kidney disease) stage 3, GFR 30-59 ml/min ([facility])    Depression    CAD (coronary artery disease)    Pneumonia    Foot laceration    Hypoxia              PLAN:     - Patient was examined and evaluated. Reviewed patient's recent lab results and pertinent diagnostic imaging. Reviewed ancillary service notes. - Patient had laceration sutured yesterday afternoon in sterile manner at bedside. Patient was agreeable. No local was used-patient is neuropathic secondary to DM. This morning dressings were clean, dry and intact without dishevelment or strike through. Will continue QoD dressing changes with betadine, xeroform, DSD.   - Antibiotics as per ID/IM   - X-rays: 1.Edema              2.No acute fractures or dislocations  - Prevalon boots ordered   - NWB to RLE   - Discussed patient with Dr. Neville Kiran   - Will continue to follow patient while they are in-house. Patient should follow up in office within one week of discharge     Kenton Casas   [phone]

## 2021-08-05 NOTE — PROGRESS NOTES
[facility] Inpatient   Resident Progress Note    S:  Hospital day: 2   Brief Synopsis: Alicia Childers is a 61 y.o. male who presented on 8/2/21 with worsening shortness of breath for the past 3 months. Patient is on 6L oxygen at home that he states he wears consistently. Patient lost consciousness on 7/31/21 after walking around a store without oxygen. CT pulmonary with contrast showed moderate pleural effusion with atelectasis, likely CHF vs pneumonia. Patient desaturated down to 80% in the ED on 4 L and was placed on 15 L nonrebreather mask. Patient currently saturating 91% on 10 L high flow nasal cannula. Overnight/interim: no acute events overnight  Patient examined at bedside this morning. Patient states he is feeling okay and slept well last night. Patient states he was able to walk to his door with PT yesterday but his oxygen level dropped a little while walking. Patient denies any shortness of breath currently but has to catch his breath when moving from sitting to laying down for physical examination. Patient denies any dizziness, chest pain, or nausea.        Cont meds:    sodium chloride      dextrose      dextrose       Scheduled meds:    furosemide  40 mg Intravenous Daily    amLODIPine  10 mg Oral Daily    aspirin  81 mg Oral Daily    atenolol  50 mg Oral Daily    atorvastatin  80 mg Oral Nightly    budesonide  0.5 mg Nebulization BID    mirtazapine  30 mg Oral Nightly    pantoprazole  40 mg Oral QAM AC    traZODone  400 mg Oral Nightly    sodium chloride flush  5-40 mL Intravenous 2 times per day    enoxaparin  40 mg Subcutaneous Daily    ipratropium-albuterol  1 ampule Inhalation Q4H WA    insulin glargine  30 Units Subcutaneous BID    insulin lispro  11 Units Subcutaneous TID      PRN meds: perflutren lipid microspheres, sodium chloride flush, sodium chloride, ondansetron **OR** ondansetron, polyethylene glycol, acetaminophen **OR** acetaminophen, hydrALAZINE, glucose, dextrose, glucagon (rDNA), dextrose, glucose, dextrose, glucagon (rDNA), dextrose     I reviewed the patient's past medical and surgical history, Medications and Allergies. O:  /83   Pulse 58   Temp 95.9 °F (35.5 °C) (Temporal)   Resp 15   Ht 6' (1.829 m)   Wt (!) 360 lb (163.3 kg)   SpO2 91%   BMI 48.82 kg/m²   24 hour I&O: I/O last 3 completed shifts: In: 600 [P.O.:600]  Out: 1350 [Urine:1350]  No intake/output data recorded. Physical Exam  Constitutional:       General: He is not in acute distress. Appearance: He is obese. Comments: Wearing high flow nasal cannula (10 L)   HENT:      Head: Normocephalic and atraumatic. Eyes:      Conjunctiva/sclera: Conjunctivae normal.      Pupils: Pupils are equal, round, and reactive to light. Cardiovascular:      Rate and Rhythm: Normal rate and regular rhythm. Pulses: Normal pulses. Heart sounds: Normal heart sounds. Pulmonary:      Effort: Pulmonary effort is normal. No respiratory distress. Breath sounds: No wheezing or rhonchi. Abdominal:      General: Bowel sounds are normal.   Musculoskeletal:      Cervical back: Normal range of motion. Right lower leg: Edema present. Left lower leg: Edema present. Comments: Mild trace pitting edema from foot to mid-shin   Skin:     General: Skin is warm. Findings: Abrasion (R shin/knee) and laceration (underneath R second toe, sutured and wrapped) present. Neurological:      Mental Status: He is alert and oriented to person, place, and time. Psychiatric:         Mood and Affect: Mood normal.         Labs:  Na/K/Cl/CO2:  144/4.1/105/30 (08/05 0548)  BUN/Cr/glu/ALT/AST/amyl/lip:  23/1.8/--/--/--/--/-- (08/05 0548)  WBC/Hgb/Hct/Plts:  10.4/13.1/43.0/198 (08/05 0548)  estimated creatinine clearance is 69 mL/min (A) (based on SCr of 1.8 mg/dL (H)).   Other pertinent labs as noted below    Radiology:  CT HEAD WO CONTRAST   Final Result   No skull fracture or acute intracranial abnormality. CTA PULMONARY W CONTRAST   Final Result   No central pulmonary embolism or aortic dissection. The distal subsegmental   and peripheral vessels are poorly evaluated due to poor contrast.      Cardiomegaly with moderate pleural effusion with atelectasis likely CHF and   or pneumonia. 2-4 mm nonspecific pulmonary nodules. Consider surveillance according to   Fleischner society guidelines. XR FOOT RIGHT (MIN 3 VIEWS)   Final Result   Nonspecific edema of the foot. No acute fractures or dislocations in the right foot. XR CHEST (2 VW)    (Results Pending)         Resident Assessment and Plan       1. Acute on chronic respiratory failure 2/2 CHF vs COPD exacerbation  CTA pulmonary w contrast 8/2: as listed above  CT lung screening 8/2: as listed above  Patient has history of HFpEF, follows with Dr. Tracy Patel. Last echo in 2016 (EF 65%)  Patient on 6 L NC at home and uses BiPAP at night  Patient is currently saturating at 91% on 10 L high-flow nasal cannula, decreased from 15 L yesterday  Patient receiving IV Lasix - net output since admission 2.3 L. Patient's edema is improving. · Awaiting Echo with bubble study  · Continue 40 mg Lasix IV daily  · Strict Is and Os  · Pulmonary following - continue oxygen therapy and BiPAP, Brovana BID, Pulmicort BID and Duonebs  · Wean O2 as able. · Will consider repeating CXR tomorrow      2. ARLINE on CKD stage IIIb  Cr 2.1 mg/dL on admission, decreased to 1.7 mg/dL following fluid bolus in ER. Baseline 1.3 - 1.5 mg/dL. Creatinine 1.8 mg/dL, stable. FEUrea 48.1% consistent with intrinsic renal disease  [facility] Dr. Anastacia Granados  · Nephrology following  · Continue to monitor creatinine and GFR  · Hold HCTZ, Losartan, and metformin      3. HTN  -135, DBP 72-85  Atenolol may contribute to airway constriction.  Patient would likely benefit from switching to metoprolol or carvedilol prior to discharge   · Continue atenolol and amlodipine   · Continue to hold HCTZ and Losartan due to ARLINE. 4. Foot laceration  Large laceration located under the R second toe, suture placed by podiatry  · Dressing changes by podiatry daily     5. Type II diabetes   Home regimen: Novolog 20U daily, Novolin 70/30 55U BID  HbA1c (8/3): 6.3  · Continue Lantus 30 U BID and Humalog 11 U with meals (pharmacy replacement for patient's home regimen - Novolin not on formulary)  · Hold metformin due to ARLINE    6. HLD  · Continue Lipitor    7. GERD  · Continue protonix     8. Depression  Patient's major motivating factor is his dog  · Continue Trazodone and Remeron  · Hold Gabapentin  · Psychologist, Dr. Brandon Yun, following    9. Hx of Hep C infection  · Awaiting hep C viral load    10. Hx of cocaine abuse  UDS positive for fentanyl - possible false positive due to patient's use of Trazodone   Patient denies any recent drug abuse. Endorses a past history of IV cocaine abuse and abuse of various other drugs. Denies ever using heroin.        PT/OT evaluation: Consulted  DVT prophylaxis: Lovenox   GI prophylaxis: Protonix  Disposition: Remain inpatient  Diet: Adult diet; regular        Electronically signed by Rodriguez Montgomery on 8/5/2021 at 8:47 AM  Attending physician: Dr. Sharyle King

## 2021-08-06 NOTE — CONSULTS
Comprehensive Nutrition Assessment    Type and Reason for Visit:  Initial, Consult     Nutrition Education    · Verbally reviewed information with Patient  · Educated on Cardiac cho consistent MNT  · Written educational materials provided. · Contact name and number provided. Nutrition Recommendations/Plan:  Continue Current Diet, Start Oral Nutrition Supplement Nito BID    Nutrition Assessment:  Consult for DI & wound. Provided pt w/ extensive verbal & written education. Admit w/ pna & hypoxia. H/o CKD, DM, HLD, CHF, CAD, & Hep C. Pt consuming % meals. Will add Nito BID for wound healing. Malnutrition Assessment:  Malnutrition Status:  No malnutrition    Context:  Chronic Illness     Findings of the 6 clinical characteristics of malnutrition:  Energy Intake:  No significant decrease in energy intake  Weight Loss:  No significant weight loss     Body Fat Loss:  No significant body fat loss     Muscle Mass Loss:  No significant muscle mass loss    Fluid Accumulation:  No significant fluid accumulation     Strength:  Not Performed    Estimated Daily Nutrient Needs:  Energy (kcal):  4644-6201 (13-14cal/kg CBW); Weight Used for Energy Requirements:  Current     Protein (g):  120-145 (1.5-1.8g/kg IBW); Weight Used for Protein Requirements:  Ideal        Fluid (ml/day):  6969-4757; Method Used for Fluid Requirements:  1 ml/kcal      Nutrition Related Findings:  A&Ox4, -I&Os (-2.5L), h/o substance abuse, +BS, missing teeth, +1-2 edema, hyperglycemia      Wounds:  Open Wounds       Current Nutrition Therapies:    ADULT DIET;  Regular; 3 carb choices (45 gm/meal)    Anthropometric Measures:  · Height: 6' (182.9 cm)  · Current Body Weight: 360 lb (163.3 kg) (8/2, no method)   · Admission Body Weight: 360 lb (163.3 kg)    · Usual Body Weight: 381 lb (172.8 kg) (3/2021, actual)     · Ideal Body Weight: 178 lbs; % Ideal Body Weight 202.2 %   · BMI: 48.8  · BMI Categories: Obese Class 3 (BMI 40.0 or greater) Nutrition Diagnosis:   · Increased nutrient needs related to increase demand for energy/nutrients as evidenced by wounds    Nutrition Interventions:   Food and/or Nutrient Delivery:  Continue Current Diet, Start Oral Nutrition Supplement (Nito BID)  Nutrition Education/Counseling:  Education completed   Coordination of Nutrition Care:  Continue to monitor while inpatient    Goals:  Pt to consume >75% meals/ONS       Nutrition Monitoring and Evaluation:   Behavioral-Environmental Outcomes:  None Identified   Food/Nutrient Intake Outcomes:  Food and Nutrient Intake, Supplement Intake  Physical Signs/Symptoms Outcomes:  Biochemical Data, Fluid Status or Edema, Nutrition Focused Physical Findings, Skin, Weight     Discharge Planning:     Too soon to determine     Electronically signed by Seth Lester RD, LD on 8/6/21 at 3:33 PM EDT    Contact: [phone]

## 2021-08-06 NOTE — PROGRESS NOTES
[facility] Inpatient   Resident Progress Note    S:  Hospital day: 3   Brief Synopsis: Arminda Fermin is a 61 y.o. male who presented on 8/2/21 with worsening shortness of breath for the past 3 months. Patient is on 6L oxygen at home that he states he wears consistently. Patient lost consciousness on 7/31/21 after walking around a store without oxygen. CT pulmonary with contrast showed moderate pleural effusion with atelectasis, likely CHF vs pneumonia. Patient desaturated down to 80% in the ED on 4 L and was placed on 15 L nonrebreather mask. Patient currently saturating 89% on 10 L high flow nasal cannula. Overnight/interim: no acute events overnight  Patient examined at bedside this morning. Patient states he is feeling good today. Patient states that his oxygen was weaned down to 8 L HF NC yesterday and he is breathing comfortably. Patient states he has been able to walk to the bathroom without getting short of breath. Patient would like to have someone walk with him and check his vitals as he is worried about his oxygen dropping when he goes home. Patient states he has a pulse oximeter at home. Patient denies any chest pain or dizziness at this time and was encouraged to continue using his incentive spirometer.       Cont meds:    sodium chloride      dextrose      dextrose       Scheduled meds:    fluticasone  1 spray Each Nostril BID    furosemide  40 mg Intravenous BID    insulin glargine  30 Units Subcutaneous BID    amLODIPine  10 mg Oral Daily    aspirin  81 mg Oral Daily    atenolol  50 mg Oral Daily    atorvastatin  80 mg Oral Nightly    budesonide  0.5 mg Nebulization BID    mirtazapine  30 mg Oral Nightly    pantoprazole  40 mg Oral QAM AC    sodium chloride flush  5-40 mL Intravenous 2 times per day    enoxaparin  40 mg Subcutaneous Daily    ipratropium-albuterol  1 ampule Inhalation Q4H WA    insulin lispro  11 Units Subcutaneous TID WC     PRN meds: sodium chloride, perflutren lipid microspheres, sodium chloride flush, sodium chloride, ondansetron **OR** ondansetron, polyethylene glycol, acetaminophen **OR** acetaminophen, hydrALAZINE, glucose, dextrose, glucagon (rDNA), dextrose, glucose, dextrose, glucagon (rDNA), dextrose     I reviewed the patient's past medical and surgical history, Medications and Allergies. O:  BP (!) 170/93   Pulse 80   Temp 97.6 °F (36.4 °C) (Temporal)   Resp 20   Ht 6' (1.829 m)   Wt (!) 360 lb (163.3 kg)   SpO2 (!) 87%   BMI 48.82 kg/m²   24 hour I&O: I/O last 3 completed shifts: In: 660 [P.O.:660]  Out: 1050 [Urine:1050]  No intake/output data recorded. Physical Exam  Constitutional:       General: He is not in acute distress. Appearance: He is obese. Comments: Wearing high flow nasal cannula (8 L)   HENT:      Head: Normocephalic and atraumatic. Eyes:      Conjunctiva/sclera: Conjunctivae normal.      Pupils: Pupils are equal, round, and reactive to light. Cardiovascular:      Rate and Rhythm: Normal rate and regular rhythm. Pulses: Normal pulses. Heart sounds: Normal heart sounds. Pulmonary:      Effort: Pulmonary effort is normal. No respiratory distress. Breath sounds: No wheezing or rhonchi. Abdominal:      General: Bowel sounds are normal.   Musculoskeletal:      Cervical back: Normal range of motion. Right lower leg: Edema present. Left lower leg: Edema present. Comments: Trace pitting edema from foot to midshin   Skin:     General: Skin is warm. Findings: Abrasion (R shin/knee) and laceration (underneath R fourth toe, sutured and wrapped) present. Neurological:      Mental Status: He is alert and oriented to person, place, and time.    Psychiatric:         Mood and Affect: Mood normal.         Labs:  Na/K/Cl/CO2:  138/4.0/99/29 (08/06 6425)  BUN/Cr/glu/ALT/AST/amyl/lip:  21/1.7/--/--/--/--/-- (08/06 6540)  WBC/Hgb/Hct/Plts:  11.5/14.0/45.0/227 (08/06 3387)  estimated regular        Electronically signed by Hansel Patel on 8/6/2021 at 4:01 PM  Attending physician: Dr. Adamaris Dunn

## 2021-08-06 NOTE — PROGRESS NOTES
[facility] Inpatient   Resident Progress Note    S:  Hospital day: 3    Brief Synopsis: Emir Mabry is a 61 y.o. male with a PMH of HTN, COPD, Sleep apnea, Type 2 diabetes, CAD status post stent placement, Depression, Hyperlipidemia, Hepatitis C and GERD.     The patient presents to ED for Loss of Consciousness. He reports worsening shortness of breath for the past 3 months. Patient is on 6L oxygen at home that he states he wears consistently. Patient lost consciousness on 7/31/21 after walking around a store without oxygen. No seizures, no bladder incontinence.     CT pulmonary with contrast showed cardiomegaly and moderate bilateral pleural effusions with atelectasis, likely CHF vs pneumonia. Also 2-4 mm nonspecific pulmonary nodules. Patient desaturated down to 80% in the ED on 4 L and was placed on 15 L nonrebreather mask. Patient currently on NC 10 L O2. He is wearing BiPAP during the night.  Patient was compliant with his BiPAP last night. Echo 08/05: EF 45-50% (last EF 65% 2026), dilated RV and dilated aortic root.     Patient states he is feeling okay today and slept good. He denies acute events during the night.     He is being followed by Dr. Liv Mcclain, Psychologist.     SW consulted 08/03. Patient states he lives in a 2 floors house with 1 roommate. His room is in the 2nd floor.     Patient examined at bedside this morning. Patient was found wearing BiPAP. Patient denies currently feeling short of breath. Patient maintains conversation without having to stop and catch his breath. Patient states he was able to eat and did not notice and difficulty breathing while eating. Patient denies headache, dizziness, and chest pain. He has trace bilateral edema until the mid shin, better than yesterday.     The patient has a right deep foot laceration. Normal foot X-ray. The wound was cleaned and sutured yesterday. Podiatry following.     Cont meds:    sodium chloride      dextrose      dextrose Scheduled meds:    furosemide  40 mg Intravenous BID    insulin glargine  30 Units Subcutaneous BID    amLODIPine  10 mg Oral Daily    aspirin  81 mg Oral Daily    atenolol  50 mg Oral Daily    atorvastatin  80 mg Oral Nightly    budesonide  0.5 mg Nebulization BID    mirtazapine  30 mg Oral Nightly    pantoprazole  40 mg Oral QAM AC    traZODone  400 mg Oral Nightly    sodium chloride flush  5-40 mL Intravenous 2 times per day    enoxaparin  40 mg Subcutaneous Daily    ipratropium-albuterol  1 ampule Inhalation Q4H WA    insulin lispro  11 Units Subcutaneous TID WC     PRN meds: perflutren lipid microspheres, sodium chloride flush, sodium chloride, ondansetron **OR** ondansetron, polyethylene glycol, acetaminophen **OR** acetaminophen, hydrALAZINE, glucose, dextrose, glucagon (rDNA), dextrose, glucose, dextrose, glucagon (rDNA), dextrose     I reviewed the patient's Past Medical and Surgical History, Medications and Allergies. O:  VS: BP (!) 148/75   Pulse 75   Temp 97 °F (36.1 °C) (Temporal)   Resp 20   Ht 6' (1.829 m)   Wt (!) 360 lb (163.3 kg)   SpO2 (!) 89%   BMI 48.82 kg/m²     Physical Exam:  Physical Exam  Constitutional:       General: He is not in acute distress. Appearance: Normal appearance. He is obese. He is not ill-appearing or toxic-appearing. HENT:      Head: Normocephalic and atraumatic. Cardiovascular:      Rate and Rhythm: Normal rate and regular rhythm. Pulses: Normal pulses. Heart sounds: Normal heart sounds. No murmur heard. No friction rub. No gallop. Pulmonary:      Effort: Pulmonary effort is normal. No respiratory distress. Breath sounds: Normal breath sounds. No stridor. No wheezing, rhonchi or rales. Abdominal:      General: Bowel sounds are normal.      Palpations: Abdomen is soft. Tenderness: There is no abdominal tenderness. There is no guarding or rebound.           Comments: Abrasion in the abdomen RLQ   Musculoskeletal: General: No swelling or tenderness. Normal range of motion. Cervical back: Normal range of motion and neck supple. No tenderness. Right lower leg: Edema present. Left lower leg: Edema present. Feet:       Comments: Trace bilateral non-pitting edema until mid shin   Feet:      Comments: Right foot, 4th toe deep laceration cleaned and sutured  Skin:     General: Skin is warm. Coloration: Skin is not jaundiced or pale. Findings: Lesion present. No erythema or rash. Comments: Abrasions in abdomen RLQ, right knee and right proximal shin   Neurological:      General: No focal deficit present. Mental Status: He is alert and oriented to person, place, and time. Mental status is at baseline. Sensory: No sensory deficit. Motor: No weakness. Psychiatric:         Mood and Affect: Mood normal.         Behavior: Behavior normal.         Thought Content: Thought content normal.         Judgment: Judgment normal.          Labs:  Na/K/Cl/CO2:  138/4.0/99/29 (08/06 2195)  BUN/Cr/glu/ALT/AST/amyl/lip:  21/1.7/--/--/--/--/-- (08/06 1245)  WBC/Hgb/Hct/Plts:  11.5/14.0/45.0/227 (08/06 2316)  estimated creatinine clearance is 73 mL/min (A) (based on SCr of 1.7 mg/dL (H)). Other pertinent labs as noted below    New Imaging:  CT HEAD WO CONTRAST   Final Result   No skull fracture or acute intracranial abnormality. CTA PULMONARY W CONTRAST   Final Result   No central pulmonary embolism or aortic dissection. The distal subsegmental   and peripheral vessels are poorly evaluated due to poor contrast.      Cardiomegaly with moderate pleural effusion with atelectasis likely CHF and   or pneumonia. 2-4 mm nonspecific pulmonary nodules. Consider surveillance according to   Fleischner society guidelines. XR FOOT RIGHT (MIN 3 VIEWS)   Final Result   Nonspecific edema of the foot. No acute fractures or dislocations in the right foot.          XR CHEST (2 VW)    (Results Pending)   XR CHEST (2 VW)    (Results Pending)       A/P:  Principal Problem:    Shortness of breath  Active Problems: Morbid obesity    Emphysema/COPD    Hypertension    Diabetes mellitus type 2 in obese (HCC)    CKD (chronic kidney disease) stage 3, GFR 30-59 ml/min ([facility])    Depression    CAD (coronary artery disease)    Pneumonia    Foot laceration    Hypoxia  Resolved Problems:    * No resolved hospital problems. *    1. Worsening shortness of breath 2/2 CHF vs COPD exacerbation  CTA pulmonary w contrast on 08/02: as listed above  CT lung screening on 08/02: as listed above  Patient has history of HFpEF, followed with Dr. Hayley Guajardo. Last echo in 2016 (EF 65%)  Patient on 6 L NC at home, 4 L O2 in the car and uses BiPAP at night  Patient is currently on 10 L O2 NC  Unlikely pneumonia as patient is not complaining of any cough or sputum production, no fever, normal WBCs, and procal not significantly elevated  Pulmonary nodules on CT - Pulmonology does not consider that the nodules should be followed at this moment  Suspect CHF due to elevated ProBNP  · 40 mg IV Lasix 08/05. Patient remains edematous but improving. · Strict Is and Os  · Pulmonology following - continue oxygen therapy and BiPAP, start Brovana BID, continue Pulmicort BID and Duonebs  · Wean O2 as able. · Echo 08/05: EF 45-50% (last EF 65% in 206), dilated RV and dilated aortic root  · Order CXR today 08/06                2. ARLINE on CKD stage IIIb  Cr 2.1 mg/dL on admission, 1.7 mg/dL today  · Urine tests ordered, will calculate FENa and FEUrea  · FeNa 1.2% and FeUrea 48.1% suspecting an intrinsic renal disease  · Nephro consulted - Dr. Todd Yung  · Continue to monitor Cr, BUN and GFR  · Hold HCTZ, Losartan, and Metformin                 3.  HTN  148/75  Atenolol may contribute to airway constriction. Patient would likely benefit from switching to metoprolol or carvedilol prior to discharge.   · Continue atenolol and amlodipine   · Continue to hold HCTZ and Losartan due to ARLINE. · Consider adding hydralazine if high BB                4. Foot laceration  Large laceration located under the Right 4th toe  · Wound care consulted   · Wound was cleaned and sutured on 08/04  · Podiatry following                5. Type II diabetes   Home regimen: Novolog 20U daily, Novolin 70/30 55U BID  HbA1c (08/03): 6.3  Glucose today 155 (Goal 140-180)  · Continue Lantus 30 U BID and Humalog 11 U with meals (pharmacy replacement for patient's home regimen)  · Hold metformin due to ARLINE     6. HLD  · Continue Lipitor     7. GERD  · Continue Protonix      8. Depression  Patient's major motivating factor is his dog  · Continue Trazodone and Remeron  · Hold Gabapentin  · Psychologist, Dr. Rexene Holter, following     9. Hx of Hep C infection  · Awaiting for hep C viral load     10. Hx of cocaine abuse  · Urine Drug test: positive for fentanyl  · Patient denies any recent drug abuse. Endorses a past history of IV cocaine abuse and abuse of various other drugs.  Denies ever using heroin.     DVT Prophylaxis: Lovenox  GI Prophylaxis: Protonix  Diet: Adult  PT/OT following  Wound care and Podiatry following      Electronically signed by Sherice Gonsalves MD on 8/6/2021 at 8:50 AM  This case was discussed with attending physician Dr. Praveen Berger

## 2021-08-06 NOTE — CARE COORDINATION
Discussed NWB status of RLE with Dr. Bryson De, pt is okay for partial WB to heel at 50% with surgical shoe.

## 2021-08-06 NOTE — PROGRESS NOTES
Dheeraj Lucio M.D.,[facility]  Robbie Bajwa D.O., F.A.C.O.I., Sachin Mariano M.D. Inge Sanchez M.D. Jose C Rand D.O. Daily Pulmonary Progress Note    Patient:  Thu Laird 61 y.o. male MRN: [id number]     Date of Service: 8/6/2021      Synopsis     We are following patient for  COPD, home oxygen     \"CC\" SOB    Code status: FULL      Subjective      Patient was seen and examined. Laying in bed without distress, 87% on 9L HFNC. Patient states he wore his Bipap last night. RN at bedside and educated that patient is okay to be 87-88% and to continue to titrate oxygen. FiO2 decreased to 80% on Bipap. Review of Systems:  Constitutional: Denies fever, weight loss, night sweats, and fatigue  Skin: Denies pigmentation, dark lesions, and rashes   HEENT: Denies hearing loss, tinnitus, ear drainage, epistaxis, sore throat, and hoarseness. Cardiovascular: Denies palpitations, chest pain, and chest pressure. Respiratory: Denies cough, dyspnea at rest, hemoptysis, apnea, and choking.   Gastrointestinal: Denies nausea, vomiting, poor appetite, diarrhea, heartburn or reflux  Genitourinary: Denies dysuria, frequency, urgency or hematuria  Musculoskeletal: Denies myalgias, muscle weakness, and bone pain  Neurological: Denies dizziness, vertigo, headache, and focal weakness  Psychological: Denies anxiety and depression  Endocrine: Denies heat intolerance and cold intolerance  Hematopoietic/Lymphatic: Denies bleeding problems and blood transfusions    24-hour events:  None     Objective   Vitals: BP (!) 148/75   Pulse 75   Temp 97 °F (36.1 °C) (Temporal)   Resp 20   Ht 6' (1.829 m)   Wt (!) 360 lb (163.3 kg)   SpO2 (!) 89%   BMI 48.82 kg/m²     I/O:    Intake/Output Summary (Last 24 hours) at 8/6/2021 0855  Last data filed at 8/6/2021 0458  Gross per 24 hour   Intake 480 ml   Output 1050 ml   Net -570 ml       Vent Information  Skin Assessment: Clean, dry, & intact  FiO2 : 90 %  SpO2: (!) 89 %  SpO2/FiO2 ratio: 108.89  I Time/ I Time %: 0.9 s  Mask Type: Full face mask  Mask Size: Medium       IPAP: 1 cmH20  CPAP/EPAP: 10 cmH2O     CURRENT MEDS :  Scheduled Meds:   furosemide  40 mg Intravenous BID    insulin glargine  30 Units Subcutaneous BID    amLODIPine  10 mg Oral Daily    aspirin  81 mg Oral Daily    atenolol  50 mg Oral Daily    atorvastatin  80 mg Oral Nightly    budesonide  0.5 mg Nebulization BID    mirtazapine  30 mg Oral Nightly    pantoprazole  40 mg Oral QAM AC    traZODone  400 mg Oral Nightly    sodium chloride flush  5-40 mL Intravenous 2 times per day    enoxaparin  40 mg Subcutaneous Daily    ipratropium-albuterol  1 ampule Inhalation Q4H WA    insulin lispro  11 Units Subcutaneous TID WC       Physical Exam:  General Appearance: appears comfortable in no acute distress. Obese  HEENT: Normocephalic atraumatic without obvious abnormality   Neck: Lips, mucosa, and tongue normal.  Supple, symmetrical, trachea midline, no adenopathy;thyroid:  no enlargement/tenderness/nodules or JVD. Lung: Breath sounds, CTA, diminished. Respirations unlabored. Symmetrical expansion. Heart: RRR, normal S1, S2. No MRG  Abdomen: Soft, NT, ND. BS present x 4 quadrants. No bruit or organomegaly. Rotund  Extremities: Pedal pulses 2+ symmetric b/l. Extremities normal, no cyanosis, clubbing. +1 BLE edema. Laceration under right 2nd toe  Musculokeletal: No joint swelling, no muscle tenderness. ROM normal in all joints of extremities. Neurologic: Mental status: Alert and Oriented X3 .     Pertinent/ New Labs and Imaging Studies     Imaging Personally Reviewed:  CTA pulmonary W contrast 8/2  The examination is technically limited due to suboptimal contrast   opacification and patient body habitus. Kj Rack this limitation, there are no   filling defects in the main pulmonary artery and the central branches.  The   distal subsegmental and peripheral vessels are poorly opacified and cannot be evaluated for distal subsegmental pulmonary embolism. Noreene Shouts is cardiomegaly. Moderately enlarged mediastinal lymph nodes are identified with lymph nodes   measuring up to 1.3 cm.  Trachea and major bronchi are patent.  There is   atelectasis and moderate pleural effusion in the lower lobes with   ground-glass opacities in the upper lobes.  2-4 mm nonspecific pulmonary   nodules are identified in the right upper lobe and right lower lobe without   change.  The liver is fatty infiltrated.  Degenerative changes are identified   in the thoracic spine       CXR 8/6  Development of mild perihilar vascular congestion with discrete patchy   parenchymal opacity in the mid lower aspect of both lungs which can represent   discrete bi basilar infiltrates.  Findings have to be correlated clinically,   can be relate with volume overload but other possibilities including   pneumonia cannot be excluded.  Minimal pleural reaction is seen in the   costophrenic sulcus.  Delete       Heart has upper borderline size.  The mediastinum demonstrate mild to   moderate ectasia of the thoracic aorta. ECHO 8/5/21   Left ventricle is normal in size . Ejection fraction is visually estimated at 45-50%. Overall ejection fraction mildly decreased . Mid to distal anterolateral and apical walls are hypokinetic. Normal left ventricle wall thickness. Indeterminate diastolic function. Moderately dilated right ventricle. Right ventricle global systolic function is mildly reduced . TAPSE 15 mm. No hemodynamically significant aortic stenosis is present. Mildly dilated aortic root (3.7 cm). No evidence for hemodynamically significant pericardial effusion. Technically difficult examination. Definity echo contrast was used to   delineate endocardial borders.       Signature      ----------------------------------------------------------------   Electronically signed by Darwin Hernandez MD(Interpreting   physician) on 08/05/2021 08:32 PM      Labs:  Lab Results   Component Value Date    WBC 11.5 08/06/2021    HGB 14.0 08/06/2021    HCT 45.0 08/06/2021    MCV 87.2 08/06/2021    MCH 27.1 08/06/2021    MCHC 31.1 08/06/2021    RDW 15.1 08/06/2021     08/06/2021    MPV 11.3 08/06/2021     Lab Results   Component Value Date     08/06/2021    K 4.0 08/06/2021    CL 99 08/06/2021    CO2 29 08/06/2021    BUN 21 08/06/2021    CREATININE 1.7 08/06/2021    LABALBU 3.6 08/02/2021    LABALBU 4.2 09/20/2011    CALCIUM 9.2 08/06/2021    GFRAA 50 08/06/2021    LABGLOM 41 08/06/2021     Lab Results   Component Value Date    PROTIME 13.0 02/22/2014    INR 1.2 02/22/2014     No results for input(s): PROBNP in the last 72 hours. No results for input(s): PROCAL in the last 72 hours. This SmartLink has not been configured with any valid records. Micro:  Respiratory panel negative     Assessment:    1. Acute on chronic respiratory failure with hypoxia and hypercapnia  2. Syncopal episode  3. Obesity hypoventilation syndrome   4. Restrictive ventilatory pattern, last PFTs 2014  5. Pulmonary hypertension WHO group 2  6. Small bilateral pleural effusions on CTA  7. Heart failure with preserved EF  8. ALEXIS treated with BiPAP, Chronic home O2 3-6L NC  9. COPD/emphysema  10. Morbid obesity BMI 48  11. Nicotine dependence in remission  12. History of polysubstance abuse. Sober since 1993  13. Pulmonary nodule 2-4mm stable      Plan:   1. Oxygen therapy, 9L HFNC, titrate to keep SpO2 >87%  2. Bipap HS and PRN at home settings of 15/10, FiO2 decreased to 80%. 3. Recruitment techniques, incentive spirometer  4. Continue Pulmicort BID and Duonebs Q4H WA  5. ECHO with bubble study reviewed. Recommend cardiology consult  6. Lasix increased to 40 mg IV BID per nephrology. 1L output past 24 hours  7. reconsult dietician for weight management and diet education. BMI 48  8. GI/DVT prophylaxis  9.  Will need ambulatory pulse ox testing done prior to discharge  10. Per Fleischner guidelines, no more follow-ups required for pulmonary nodule  11. Will need PFTs as outpatient       This plan of care was reviewed in collaboration with Dr. Yi Whipple  Electronically signed by JENELLE Neal CNP on 8/6/2021 at 8:38 AM     I personally saw, examined, and cared for the patient. Labs, medications, radiographs reviewed. I agree with history exam and plans detailed in NP note.   Meme Madrigal MD

## 2021-08-06 NOTE — PROGRESS NOTES
Department of Podiatry  Progress Note    SUBJECTIVE:  Mr. Rosalia Alegre was evaluated at bedside this morning for RLE fourth digit laceration. No acute events overnight. Patient denies any N/V/D/F/C and has no other pedal complaints at this time. OBJECTIVE:    Scheduled Meds:   furosemide  40 mg Intravenous BID    insulin glargine  30 Units Subcutaneous BID    amLODIPine  10 mg Oral Daily    aspirin  81 mg Oral Daily    atenolol  50 mg Oral Daily    atorvastatin  80 mg Oral Nightly    budesonide  0.5 mg Nebulization BID    mirtazapine  30 mg Oral Nightly    pantoprazole  40 mg Oral QAM AC    sodium chloride flush  5-40 mL Intravenous 2 times per day    enoxaparin  40 mg Subcutaneous Daily    ipratropium-albuterol  1 ampule Inhalation Q4H WA    insulin lispro  11 Units Subcutaneous TID WC     Continuous Infusions:   sodium chloride      dextrose      dextrose       PRN Meds:.perflutren lipid microspheres, sodium chloride flush, sodium chloride, ondansetron **OR** ondansetron, polyethylene glycol, acetaminophen **OR** acetaminophen, hydrALAZINE, glucose, dextrose, glucagon (rDNA), dextrose, glucose, dextrose, glucagon (rDNA), dextrose    Allergies   Allergen Reactions    Amoxicillin Hives and Shortness Of Breath    Penicillins Hives and Shortness Of Breath     TRIAMOX       BP (!) 170/93   Pulse 80   Temp 97.6 °F (36.4 °C) (Temporal)   Resp 20   Ht 6' (1.829 m)   Wt (!) 360 lb (163.3 kg)   SpO2 (!) 87%   BMI 48.82 kg/m²       EXAM:    VASCULAR:  DP and PT pulses are faint, possibly secondary to edema. CFT delayed B/L. Warm to warm from the tibial tuberosity to the distal aspect of the digits dorsally. No hair growth noted to the distal aspects dorsally.     NEUROLOGIC:  Protective sensation is diminished b/l     DERM:  Laceration site noted to the medial aspect of the fourth right digit. Laceration was sutured previously. Sutures intact and in intended position. No noted dehiscence.  Coapting as expected, skin edges well adhered. No noted pus or drainage. No SOI  Ecchymosis noted to digits 2-4 on RLE, but resolving. Edema noted to RLE-decreased since last evaluated. As pictured below:     MUSCULOSKELETAL: MMT Deferred. Equinus b/l           Scheduled Meds:   furosemide  40 mg Intravenous BID    insulin glargine  30 Units Subcutaneous BID    amLODIPine  10 mg Oral Daily    aspirin  81 mg Oral Daily    atenolol  50 mg Oral Daily    atorvastatin  80 mg Oral Nightly    budesonide  0.5 mg Nebulization BID    mirtazapine  30 mg Oral Nightly    pantoprazole  40 mg Oral QAM AC    sodium chloride flush  5-40 mL Intravenous 2 times per day    enoxaparin  40 mg Subcutaneous Daily    ipratropium-albuterol  1 ampule Inhalation Q4H WA    insulin lispro  11 Units Subcutaneous TID WC     Continuous Infusions:   sodium chloride      dextrose      dextrose       PRN Meds:.perflutren lipid microspheres, sodium chloride flush, sodium chloride, ondansetron **OR** ondansetron, polyethylene glycol, acetaminophen **OR** acetaminophen, hydrALAZINE, glucose, dextrose, glucagon (rDNA), dextrose, glucose, dextrose, glucagon (rDNA), dextrose    RADIOLOGY:  CT HEAD WO CONTRAST   Final Result   No skull fracture or acute intracranial abnormality. CTA PULMONARY W CONTRAST   Final Result   No central pulmonary embolism or aortic dissection. The distal subsegmental   and peripheral vessels are poorly evaluated due to poor contrast.      Cardiomegaly with moderate pleural effusion with atelectasis likely CHF and   or pneumonia. 2-4 mm nonspecific pulmonary nodules. Consider surveillance according to   Fleischner society guidelines. XR FOOT RIGHT (MIN 3 VIEWS)   Final Result   Nonspecific edema of the foot. No acute fractures or dislocations in the right foot.          XR CHEST (2 VW)    (Results Pending)   XR CHEST (2 VW)    (Results Pending)     BP (!) 170/93   Pulse 80   Temp 97.6 °F (36.4 °C) (Temporal)   Resp 20   Ht 6' (1.829 m)   Wt (!) 360 lb (163.3 kg)   SpO2 (!) 87%   BMI 48.82 kg/m²     LABS:    Recent Labs     08/05/21  0548 08/06/21  0641   WBC 10.4 11.5   HGB 13.1 14.0   HCT 43.0 45.0    227        Recent Labs     08/06/21  0641      K 4.0   CL 99   CO2 29   BUN 21   CREATININE 1.7*        No results for input(s): PROT, INR, APTT in the last 72 hours. ASSESSMENT:    1. RLE laceration -POA  2. RLE Trauma  3. RLE edema, pain  4. Falls  5. DM with neuropathic changes     Shortness of breath    Morbid obesity    Emphysema/COPD    Hypertension    Diabetes mellitus type 2 in obese ([facility])    CKD (chronic kidney disease) stage 3, GFR 30-59 ml/min ([facility])    Depression    CAD (coronary artery disease)    Pneumonia    Foot laceration    Hypoxia              PLAN:     - Patient was examined and evaluated. Reviewed patient's recent lab results and pertinent diagnostic imaging. Reviewed ancillary service notes. - Patient had laceration sutured previously at bedside. Dressings this morning were changed: Betadine, Xeroform, DSD. Will continue QoD dressing changes with betadine, xeroform, DSD.   - Antibiotics as per ID/IM   - X-rays: 1.Edema              2.No acute fractures or dislocations  - Prevalon boots ordered. No noted today  - NWB to RLE   - Discussed patient with Dr. Celeste Evans   - Will continue to follow patient while they are in-house. Patient should follow up in office within one week of discharge     Freda Rey   [phone]

## 2021-08-06 NOTE — PROGRESS NOTES
Wound care: Consulted for toe wound, podiatry following. Spoke to patient. Dried scabs right knee/lower leg, no wound care needed. Signing off. Re-consult if needed.  Julián Engle RN

## 2021-08-06 NOTE — PROGRESS NOTES
Physical Therapy Treatment Note    Name: Tayler Dominique  : 1960  MRN: [id number]      Date of Service: 2021    Evaluating PT:  Willie Garza PT, DPT [id number]      Room #:  2629/5301-P  Diagnosis:  Pneumonia [J18.9]  Hypoxia [R09.02]  PMHx/PSHx:    Past Medical History           Date Comments     Obesity [E66.9]       Hypertension [I10]       Hyperlipidemia [E78.5]       GERD (gastroesophageal reflux disease) [K21.9]       COPD (chronic obstructive pulmonary disease) ([facility] [J44.9]       Depression [F32.9]       Chills [R68.83]       Night sweats [R61]       Chronic sinusitis [J32.9]       SOB (shortness of breath) [R06.02]  does not know settings     Anxiety [F41.9]       Panic attacks [F41.0]       H/O cardiovascular stress test [Z92.89]       Oxygen dependent [Z99.81]       Asthma [J45.909]       Sleep apnea [G47.30]  Has CPAP machine     CAD (coronary artery disease) [I25.10]  2 stents in  .     Hepatitis C [B19.20]  Work up was negative. Saw specialist. Negative viral load.      CHF (congestive heart failure) (HCC) [I50.9]       Diabetes mellitus ([facility] [E11.9]  on insulin     NSTEMI (non-ST elevation myocardial infarction) ([facility] [I21.4] 3/2010 Hospitalized.      History of tobacco abuse [Z87.891]       Cocaine abuse ([facility] [F14.10]       Hand fracture [S62.90XA] 3/2010 Fractured both hands.       Pneumonia [J18.9]       Procedure/Surgery:  2021 Sutures to R foot laceration  Precautions:  Falls, O2, Ok to be Partial WB in surgical shoe (50% to heel) using surgical shoe and walker  Equipment Needs:  Wants Bariatric Rollator [employer]    SUBJECTIVE:    Pt lives with a roommate in a 2 story home with 8 stairs to enter and no rail. Bed is on 2nd floor and bath is on 2nd floor. Pt ambulated with no AD PTA. Pt on home O2 at 6L/min. Pt actively driving.     OBJECTIVE:   Initial Evaluation  Date: 2021 Treatment Date:  2021 Short Term/ Long Term   Goals   AM-PAC 6 Clicks 74/69 91/90    Was pt agreeable to Eval/treatment? Yes  Yes     Does pt have pain? No c/o pain No c/o pain    Bed Mobility  Rolling: Supervision  Supine to sit: Supervision  Sit to supine: Supervision  Scooting: Supervision Independent Rolling: Independent  Supine to sit: Independent  Sit to supine: Independent  Scooting: Independent   Transfers Sit to stand: SBA  Stand to sit: SBA  Stand pivot: SBA Sit to stand: Supervision  Stand to sit: Supervision  Stand pivot: Supervision Sit to stand: Mod I  Stand to sit: Mod I  Stand pivot: Mod I   Ambulation    30 feet with no AD with SBA 15 feet x 2 with WW with Supervision >150 feet with AAD Mod I   Stair negotiation: ascended and descended  NT NT 8 steps with no rail with SBA   ROM BUE:  WFL  BLE:  WFL     Strength BUE:  4+/5  BLE:  4+/5  Increase strength 1/3 grade. Balance Sitting EOB:  Independent  Dynamic Standing:  SBA Sitting EOB:  Independent  Dynamic Standing: Supervision Sitting EOB:  Independent  Dynamic Standing:   Mod I     Pt is A & O x 4  Sensation:  Reported neuropathy to B feet  Edema: None noted    Vitals:  Blood Pressure at rest - Blood Pressure post session -   Heart Rate at rest 74 Heart Rate post session 81 seated   SPO2 at rest 91% in semi Serrano's  88-92% seated  87-89% with mobility SPO2 post session 88-90% seated     Therapeutic Exercises:  NT    Patient education  Pt educated on importance of mobility, safety with mobility, transfers, gait, use of AD for safety, proper breathing technique, new WB restrictions    Patient response to education:   Pt verbalized understanding Pt demonstrated skill Pt requires further education in this area   Yes  Partially with verbal cues and assist Yes/Reinforcement     ASSESSMENT:    Conditions Requiring Skilled Therapeutic Intervention:     []Decreased strength     []Decreased ROM  [x]Decreased functional mobility  []Decreased balance   [x]Decreased endurance   []Decreased posture  []Decreased sensation  []Decreased coordination []Decreased vision  [x]Decreased safety awareness   []Increased pain       Comments:  Patient cleared by RN and agreeable to treatment. Patient found in semi Serrano's and able to get self to seated EOB with HOB elevated, increased time, and use of bed rail. Patient denied dizziness with positional change. Patient on 9L/min O2 and SpO2 monitored throughout and documented above. Patient with sutures to laceration at base of plantar surface 4th/5th digit of right foot and podiatry ordered NWB RLE. Patient not able to perform safe mobility with NWB restrictions and CM reached out to podiatry for clarification. Patient now PWB-50% on heel of R foot with surgical shoe. Patient did not have surgical shoe in the room but reported needing to use the bathroom. Patient instructed on proper technique to ambulate per new WB restrictions with poor carryover. Patient abandoned walker prior to reaching the commode and again upon reaching the bedside. Patient requesting to ambulate farther, but denied at this time due to no surgical shoe and non-compliance with short, in-room distance. Patient with increased SOB with minimal activity and assisted to seated EOB following gait. Patient sat EOB over 5 minutes to recover to 89-90% SpO2. Call light and tray table in reach. Treatment:  Patient practiced and was instructed in the following treatment:    · Bed mobility: Verbal/tactile cues for sequencing BUEs/BLEs for safe technique with rolling/supine<>sit task. · Transfer training: Verbal/tactile cues to facilitate proper hand placement, technique and safety during sit to stand task. · Gait training: Verbal and tactile cues to facilitate upright posture and safety to complete task. Verbal cues for WB restrictions with poor carryover.   · Therapeutic exercises: As noted above  · Neuromuscular education: NT    PHYSICAL THERAPY PLAN OF CARE:    PT POC is established based on physician order and patient diagnosis     Referring provider/PT Order:    08/03/21 1600  PT eval and treat Start: 08/03/21 1600, End: 08/03/21 1600, ONE TIME, Standing Count: 1 Occurrences, R      Hasmukh Pappas MD     Diagnosis:  Pneumonia [J18.9]  Hypoxia [R09.02]  Specific instructions for next treatment:  Subsequent PT sessions with focus on improved mobility, ambulation distance, WB restrictions/compliance    Current Treatment Recommendations:     [x] Strengthening to improve independence with functional mobility   [] ROM to improve independence with functional mobility   [x] Balance Training to improve static/dynamic balance and to reduce fall risk  [x] Endurance Training to improve activity tolerance during functional mobility   [x] Transfer Training to improve safety and independence with all functional transfers   [x] Gait Training to improve gait mechanics, endurance and asses need for appropriate assistive device  [x] Stair Training in preparation for safe discharge home and/or into the community   [x] Positioning to prevent skin breakdown and contractures  [x] Safety and Education Training   [x] Patient/Caregiver Education   [] HEP  [] Other     Time in  1347  Time out  1400    Total Treatment Time 13 minutes     CPT codes:  [] Low Complexity PT evaluation 26540  [] Moderate Complexity PT evaluation 07155  [] High Complexity PT evaluation [id number]  [] PT Re-evaluation [id number]  [] Gait training 05089 - minutes  [] Manual therapy 47470 - minutes  [x] Therapeutic activities 19098 13 minutes  [] Therapeutic exercises 01236 - minutes  [] Neuromuscular reeducation 64592 - minutes     Margaret Cunha, PT, DPT  License [id number]

## 2021-08-06 NOTE — PROGRESS NOTES
[facility] Attending    S: 61 y.o. male with a history of chronic respiratory failure on 4-6 L of oxygen at home, COPD with bipap at night, htn, CKD 3b, Dm2 (a1c 6.3), CAD s/p stent, MDD, anxiety, gerd, bph, HFpEF, tobacco/remote cocaine use, obesity, and + hcv ab who presented after shopping in [facility] without the use of his oxygen. He walked to the car and he passed out. He had no cp, palpitations or seizure-like activity. He was short of breath before and walking throughout the store. He was told his lips were blue. Also with increasing dyspnea over the past few months. His oxygen saturation was80% on 4L in the ER. He also was found to have an ARLINE on CKD. He was placed on a NRB mask in the ER. This morning, he states his breathing continues to improve. He is motivated to increase his activity. Is able to walk to the bathroom without feeling short of breath. O: VS- Blood pressure (!) 170/93, pulse 80, temperature 97.6 °F (36.4 °C), temperature source Temporal, resp. rate 20, height 6' (1.829 m), weight (!) 360 lb (163.3 kg), SpO2 (!) 87 %. Exam is as noted by resident with the following changes, additions or corrections:  Gen: NAD on 8 L NC  HEENT: NCAT, PERRL  CV-RRR no M/R/G   Lungs-clear  ABD-soft nonttp no masses   Ext-no C/C; tr pitting edema from his feet to his legs, improved  Laceration between his second and third right toe    Impressions:   Principal Problem:    Shortness of breath  Active Problems: Morbid obesity    Emphysema/COPD    Hypertension    Diabetes mellitus type 2 in obese (HCC)    CKD (chronic kidney disease) stage 3, GFR 30-59 ml/min ([facility])    Depression    CAD (coronary artery disease)    Pneumonia    Foot laceration    Hypoxia  Resolved Problems:    * No resolved hospital problems. *      Plan:  Pt requiring increased oxygen with acute on chronic respiratory failure. CT with pleural effusions, doubt infection. IV Lasix, echo noted. Diastolic function was unable to be evaluated, but nl EF. Elevated BNP. Follow I and O for hx of HFpEF and suspected worsening along with the effusions/copd. Also on nebs. Consider changing b-blocker for Cad. Adjust insulin for bg. Metformin on hold. Evaristo on ckd. Urine lytes show intrinsic etiology. Appreciate renal consult. BP meds on hold. Will need tobacco cessation. PT/OT  Podiatry following pt and placed 4-5 sutures rt interdigital space  Follow up outpatient with Dr. Roslyn Root for depression  Discuss compliance with use of oxygen on discharge at home  Disposition planning for the next few days to home. Attending Physician Statement  I have reviewed the chart and seen the patient with the resident(s). I personally reviewed images, EKG's and similar tests, if present. I personally reviewed and performed key elements of the history and exam.  I have reviewed and confirmed student and/or resident history and exam with changes as indicated above. I agree with the assessment, plan and orders as documented by the resident. Please refer to the resident and/or student note for additional information.       Sonal Lewis MD

## 2021-08-06 NOTE — PROGRESS NOTES
Progress Note  NEPHROLOGY    Reason for Consult: Evaluation of acute on chronic kidney disease    From 8/4/21 Consult note - History of Present Ilness: This is a 61 y.o.  male with a H/O CKD stage III followed by Dr. Camilo Huertas of our practice who now presents to ED on 8/2/2021 with worsening fatigue, shortness of breath and syncope. Admission labs included Pro-BNP 4,152, Procalcitonin 0.10, sodium 142, potassium 5.0, CO2 26, creatinine 2.1, lactic acid 4.1, troponin 24, WBC 10.6. CT of head without contrast showed no skull fracture or acute intracranial abnormality. CTA of chest with contrast showed no central pulmonary embolism or aortic dissection. Chest x-ray showed cardiomegaly with moderate pleural effusion with atelectasis likely CHF and/or pneumonia. 2 -4 mm nonspecific pulmonary nodules. Patient was subsequently admitted for further evaluation and work-up. Interval History:   8/6/21:Sitting on edge of bed, still sob with activity. Past Medical History:        Diagnosis Date    Anxiety     Asthma     CAD (coronary artery disease) 2008    2 stents in 2008 .  CHF (congestive heart failure) (HCC)     Chills     Chronic sinusitis     Cocaine abuse ([address]     COPD (chronic obstructive pulmonary disease) (HCC)     Depression     Diabetes mellitus (HCC)     on insulin    GERD (gastroesophageal reflux disease)     H/O cardiovascular stress test     Hand fracture 3/2010    Fractured both hands.  Hepatitis C     Work up was negative. Saw specialist. Negative viral load.  History of tobacco abuse     Hyperlipidemia     Hypertension     Night sweats     NSTEMI (non-ST elevation myocardial infarction) ([address] 3/2010    Hospitalized.  Obesity     Oxygen dependent     Panic attacks     Pneumonia 3/2010     LLL.     Sleep apnea     Has CPAP machine    SOB (shortness of breath)     does not know settings       Past Surgical History:        Procedure Laterality Date    COLONOSCOPY  2011    COLONOSCOPY  9/23/15    DIAGNOSTIC CARDIAC CATH LAB PROCEDURE  2/25/2009    [facility], cardiac cath/primary BM stent in proximal LAD.     OTHER SURGICAL HISTORY      anal fistula    TONSILLECTOMY         Current Medications:    Current Facility-Administered Medications: furosemide (LASIX) injection 40 mg, 40 mg, Intravenous, BID  insulin glargine (LANTUS) injection vial 30 Units, 30 Units, Subcutaneous, BID  perflutren lipid microspheres (DEFINITY) injection 1.65 mg, 1.5 mL, Intravenous, ONCE PRN  amLODIPine (NORVASC) tablet 10 mg, 10 mg, Oral, Daily  aspirin EC tablet 81 mg, 81 mg, Oral, Daily  atenolol (TENORMIN) tablet 50 mg, 50 mg, Oral, Daily  atorvastatin (LIPITOR) tablet 80 mg, 80 mg, Oral, Nightly  budesonide (PULMICORT) nebulizer suspension 500 mcg, 0.5 mg, Nebulization, BID  mirtazapine (REMERON) tablet 30 mg, 30 mg, Oral, Nightly  pantoprazole (PROTONIX) tablet 40 mg, 40 mg, Oral, QAM AC  sodium chloride flush 0.9 % injection 5-40 mL, 5-40 mL, Intravenous, 2 times per day  sodium chloride flush 0.9 % injection 5-40 mL, 5-40 mL, Intravenous, PRN  0.9 % sodium chloride infusion, 25 mL, Intravenous, PRN  enoxaparin (LOVENOX) injection 40 mg, 40 mg, Subcutaneous, Daily  ondansetron (ZOFRAN-ODT) disintegrating tablet 4 mg, 4 mg, Oral, Q8H PRN **OR** ondansetron (ZOFRAN) injection 4 mg, 4 mg, Intravenous, Q6H PRN  polyethylene glycol (GLYCOLAX) packet 17 g, 17 g, Oral, Daily PRN  acetaminophen (TYLENOL) tablet 650 mg, 650 mg, Oral, Q6H PRN **OR** acetaminophen (TYLENOL) suppository 650 mg, 650 mg, Rectal, Q6H PRN  ipratropium-albuterol (DUONEB) nebulizer solution 1 ampule, 1 ampule, Inhalation, Q4H WA  hydrALAZINE (APRESOLINE) tablet 25 mg, 25 mg, Oral, Q8H PRN  glucose (GLUTOSE) 40 % oral gel 15 g, 15 g, Oral, PRN  dextrose 50 % IV solution, 12.5 g, Intravenous, PRN  glucagon (rDNA) injection 1 mg, 1 mg, Intramuscular, PRN  dextrose 5 % solution, 100 mL/hr, Intravenous, PRN  insulin lispro (HUMALOG) injection vial 11 Units, 11 Units, Subcutaneous, TID WC  glucose (GLUTOSE) 40 % oral gel 15 g, 15 g, Oral, PRN  dextrose 50 % IV solution, 12.5 g, Intravenous, PRN  glucagon (rDNA) injection 1 mg, 1 mg, Intramuscular, PRN  dextrose 5 % solution, 100 mL/hr, Intravenous, PRN    Allergies:  Amoxicillin and Penicillins    Social History:      reports that he quit smoking about 13 years ago. His smoking use included cigarettes. He started smoking about 41 years ago. He has a 76.00 pack-year smoking history. He has never used smokeless tobacco. He reports that he does not drink alcohol and does not use drugs. Family History:     Family History   Problem Relation Age of Onset    Heart Failure Father     Diabetes Mother     Heart Surgery Brother          Review of Systems:       Pertinent positives stated above in HPI. All other systems were reviewed and were negative.     Physical exam:   Constitutional:  VITALS:  BP (!) 170/93   Pulse 80   Temp 97.6 °F (36.4 °C) (Temporal)   Resp 20   Ht 6' (1.829 m)   Wt (!) 360 lb (163.3 kg)   SpO2 (!) 87%   BMI 48.82 kg/m²   CURRENT TEMPERATURE:  Temp: 97.6 °F (36.4 °C)  CURRENT RESPIRATORY RATE:  Resp: 20  CURRENT PULSE:  Pulse: 80  CURRENT BLOOD PRESSURE:  BP: (!) 170/93  24HR BLOOD PRESSURE RANGE:  Systolic (01OPU), VFY:701 , Min:132 , NÚÑEZ:837   ; Diastolic (87RNR), YJL:64, Min:74, Max:93    24HR INTAKE/OUTPUT:      Intake/Output Summary (Last 24 hours) at 8/6/2021 1312  Last data filed at 8/6/2021 0915  Gross per 24 hour   Intake 480 ml   Output 1050 ml   Net -570 ml       General appearance: alert, in no acute distress  Skin: No rashes or lesions on exposed skin  Neck: No JVD  Lungs: Clear, no adventitious sounds  Heart: RRR, no rub  Abdomen: Soft, non-tender, + bowel sounds  Extremities: 1+ BLE edema, dressing to right foot  Neurologic: Mental status: Alert, oriented, thought content appropriate      DATA:      CBC:   Lab Results   Component Value Date WBC 11.5 08/06/2021    RBC 5.16 08/06/2021    HGB 14.0 08/06/2021    HCT 45.0 08/06/2021    MCV 87.2 08/06/2021    MCH 27.1 08/06/2021    MCHC 31.1 08/06/2021    RDW 15.1 08/06/2021     08/06/2021    MPV 11.3 08/06/2021     BMP:    Lab Results   Component Value Date     08/06/2021    K 4.0 08/06/2021    CL 99 08/06/2021    CO2 29 08/06/2021    BUN 21 08/06/2021    LABALBU 3.6 08/02/2021    LABALBU 4.2 09/20/2011    CREATININE 1.7 08/06/2021    CALCIUM 9.2 08/06/2021    GFRAA 50 08/06/2021    LABGLOM 41 08/06/2021    GLUCOSE 155 08/06/2021    GLUCOSE 126 09/20/2011       RAD:  XR FOOT RIGHT (MIN 3 VIEWS)    Result Date: 8/2/2021  EXAMINATION: THREE XRAY VIEWS OF THE RIGHT FOOT 8/2/2021 3:02 pm COMPARISON: None. HISTORY: ORDERING SYSTEM PROVIDED HISTORY: foot wound TECHNOLOGIST PROVIDED HISTORY: Reason for exam:->foot wound What reading provider will be dictating this exam?->CRC FINDINGS: There is no evidence of acute fracture. There is normal alignment of the tarsometatarsal joints. No acute joint abnormality. No focal osseous lesion. Diffused edema the right foot. Nonspecific edema of the foot. No acute fractures or dislocations in the right foot. CT HEAD WO CONTRAST    Result Date: 8/2/2021  EXAMINATION: CT OF THE HEAD WITHOUT CONTRAST  8/2/2021 5:23 pm TECHNIQUE: CT of the head was performed without the administration of intravenous contrast. Dose modulation, iterative reconstruction, and/or weight based adjustment of the mA/kV was utilized to reduce the radiation dose to as low as reasonably achievable. COMPARISON: None. HISTORY: ORDERING SYSTEM PROVIDED HISTORY: LOC TECHNOLOGIST PROVIDED HISTORY: Has a \"code stroke\" or \"stroke alert\" been called? ->No Reason for exam:->LOC Decision Support Exception - unselect if not a suspected or confirmed emergency medical condition->Emergency Medical Condition (MA) What reading provider will be dictating this exam?->CRC FINDINGS: BRAIN/VENTRICLES: No mass effect, edema or hemorrhage is seen. Mild volume loss is seen in the cerebrum with mild chronic microvascular ischemic changes. No hydrocephalus or extra-axial fluid is seen. ORBITS: The visualized portion of the orbits demonstrate no acute abnormality. SINUSES:  A mucous retention cyst is seen in the left maxillary sinus. Mild mucosal thickening in the ethmoid sinuses. The remainder of the visualized paranasal sinuses and the mastoids are grossly clear. SOFT TISSUES/SKULL:  No acute abnormality of the visualized skull or soft tissues. No skull fracture or acute intracranial abnormality. CTA PULMONARY W CONTRAST    Result Date: 8/2/2021  EXAMINATION: CTA OF THE CHEST 8/2/2021 5:23 pm TECHNIQUE: CTA of the chest was performed after the administration of intravenous contrast.  Multiplanar reformatted images are provided for review. MIP images are provided for review. Dose modulation, iterative reconstruction, and/or weight based adjustment of the mA/kV was utilized to reduce the radiation dose to as low as reasonably achievable. COMPARISON: Previous examination on the same day 3 of is prior. HISTORY: ORDERING SYSTEM PROVIDED HISTORY: hypoxic TECHNOLOGIST PROVIDED HISTORY: Reason for exam:->hypoxic Decision Support Exception - unselect if not a suspected or confirmed emergency medical condition->Emergency Medical Condition (MA) What reading provider will be dictating this exam?->CRC FINDINGS: The examination is technically limited due to suboptimal contrast opacification and patient body habitus. Given this limitation, there are no filling defects in the main pulmonary artery and the central branches. The distal subsegmental and peripheral vessels are poorly opacified and cannot be evaluated for distal subsegmental pulmonary embolism. There is cardiomegaly. Moderately enlarged mediastinal lymph nodes are identified with lymph nodes measuring up to 1.3 cm. Trachea and major bronchi are patent. There is atelectasis and moderate pleural effusion in the lower lobes with ground-glass opacities in the upper lobes. 2-4 mm nonspecific pulmonary nodules are identified in the right upper lobe and right lower lobe without change. The liver is fatty infiltrated. Degenerative changes are identified in the thoracic spine. .     No central pulmonary embolism or aortic dissection. The distal subsegmental and peripheral vessels are poorly evaluated due to poor contrast. Cardiomegaly with moderate pleural effusion with atelectasis likely CHF and or pneumonia. 2-4 mm nonspecific pulmonary nodules. Consider surveillance according to Fleischner society guidelines. CT Lung Screen (Initial/Annual)    Result Date: 2021  EXAMINATION: LOW DOSE SCREENING CT OF THE CHEST WITHOUT CONTRAST 2021 1:24 pm TECHNIQUE: Low dose lung cancer screening CT of the chest was performed without the administration of intravenous contrast.  Multiplanar reformatted images are provided for review. Dose modulation, iterative reconstruction, and/or weight based adjustment of the mA/kV was utilized to reduce the radiation dose to as low as reasonably achievable. COMPARISON: 2020 HISTORY: ORDERING SYSTEM PROVIDED HISTORY: Cigarette nicotine dependence in remission TECHNOLOGIST PROVIDED HISTORY: Age: 61 y.o. Smoking History: Social History Tobacco Use Smoking status: Former Smoker Packs/day: 2.00 Years: 38.00 Pack years: 68 Types: Cigarettes Start date:  Quit date: 2008 Years since quittin.9 Smokeless tobacco: Never Used Vaping Use Vaping Use: Never used Alcohol use: No Alcohol/week: 0.0 standard drinks Comment: was heavy drinker; quit age 35;  drinks 1-2 cups of coffee daily Drug use: No Comment: Acid, marijuana, cocaine, STOPPED ALL DRUGS  Pack years: 68 Last CT lung screen: 2020 Is there documentation of shared decision making? ->Yes Does the patient show any signs or symptoms of lung cancer? ->No Is this the patient with the [facility] Nodule/Lung Cancer Screening Program, please contact the Nurse Navigator at [phone]. Assessment/Plan    1. Acute on CKD Stage 3B  Baseline over the past 2 years 1.3-1.5  Admitting serum creatinine 2.1  HCTZ and ARB held upon admission  No aggressive work-up planned as this patient is well-known to our practice  Follow daily labs and strict intake and output  Avoid NSAIDs  8/6 Cr 1.7 is improving, follow closely with diuresis    2. Shortness of breath  Acute on chronic respiratory failure  Followed by the pulmonary team  Currently on nasal cannula and BiPAP at night  8/5 Lasix increased to 40 mg IV BID  UO last 24 hrs 1050 ml    3. Hypertension with CKD stage 1-4  Last recorded echo showed EF of 65% in March 2016: For repeat  BP at goal of less than 130/80 on current meds    4. Type II DM  Care per the primary team    5. Chronic kidney disease stage IIIb  Based on history of hypertension and diabetes  Baseline over the past 2 years 1.3-1.5      Thank you for allowing us to participate in care of Mr Enzo Priest, APRN - CNS  8/6/2021  1:12 PM     Attending addendum    Patient seen and examined all key components of the physical performed independently , case discussed with NP, all pertinent labs and radiologic tests personally reviewed agree with above  -Lasix increased to 40 mg IV twice daily; monitor response next 12 to 24 hours if suboptimal will transition to a Bumex drip    .       Whit Guo MD

## 2021-08-06 NOTE — CARE COORDINATION
Pt would benefit from bariatiric rollator, attempted to perfect serve resident, no answer from phone call. Left a sticky note for DME order, and updated bedside RN. 2pm referral to [facility] for bariatric rollator. Ariadna Bundy, MSW, LSW

## 2021-08-07 NOTE — PROGRESS NOTES
Date: 8/6/2021    Time: 10:44 PM    Patient Placed On BIPAP/CPAP/ Non-Invasive Ventilation? Yes, found on     If no must comment. Facial area red/color change? No           If YES are Blister/Lesion present? No   If yes must notify nursing staff  BIPAP/CPAP skin barrier?   Yes    Skin barrier type:mepilexlite       Comments:        Raheem Michael

## 2021-08-07 NOTE — PROGRESS NOTES
[facility] In[facility]   Resident Progress Note    S:  Hospital day: 4   Brief Synopsis: Jak Villa is a 61 y.o. male who presented with shortness of breath    Overnight/interim:    No acute events overnight. Patient reports feeling some improvement in his SOB. Patient reports not taking off his oxygen unless needed to blow his nose. He denies any increase in SOB, chest pain or abdominal pain at this time. Cont meds:    sodium chloride      dextrose      dextrose       Scheduled meds:    fluticasone  1 spray Each Nostril BID    furosemide  40 mg Intravenous BID    insulin glargine  30 Units Subcutaneous BID    amLODIPine  10 mg Oral Daily    aspirin  81 mg Oral Daily    atenolol  50 mg Oral Daily    atorvastatin  80 mg Oral Nightly    budesonide  0.5 mg Nebulization BID    mirtazapine  30 mg Oral Nightly    pantoprazole  40 mg Oral QAM AC    sodium chloride flush  5-40 mL Intravenous 2 times per day    enoxaparin  40 mg Subcutaneous Daily    ipratropium-albuterol  1 ampule Inhalation Q4H WA    insulin lispro  11 Units Subcutaneous TID WC     PRN meds: sodium chloride, melatonin, perflutren lipid microspheres, sodium chloride flush, sodium chloride, ondansetron **OR** ondansetron, polyethylene glycol, acetaminophen **OR** acetaminophen, hydrALAZINE, glucose, dextrose, glucagon (rDNA), dextrose, glucose, dextrose, glucagon (rDNA), dextrose     I reviewed the patient's past medical and surgical history, Medications and Allergies. O:  BP (!) 158/86   Pulse 78   Temp 98.1 °F (36.7 °C) (Temporal)   Resp 23   Ht 6' (1.829 m)   Wt (!) 370 lb 2 oz (167.9 kg)   SpO2 (!) 87%   BMI 50.20 kg/m²   24 hour I&O: I/O last 3 completed shifts: In: 360 [P.O.:360]  Out: 325 [Urine:325]  No intake/output data recorded.      General Appearance: alert and oriented to person, place and time, well developed and well- nourished, in no acute distress  Skin: warm and dry, no rash or erythema  Head: normocephalic and atraumatic  Eyes: pupils equal, round, and reactive to light, extraocular eye movements intact, conjunctivae normal  Pulmonary/Chest: rales noted on lower lobes bilaterally. No rhonchi, normal air movement, no respiratory distress  Cardiovascular: normal rate, regular rhythm, normal S1 and S2, no murmurs, rubs, clicks, or gallops  Abdomen: soft, non-tender, obese, normal bowel sounds,  Extremities: no cyanosis, clubbing. Minimal edema  Musculoskeletal: normal range of motion, no joint swelling, deformity or tenderness  Neurologic: reflexes normal and symmetric, no cranial nerve deficit, gait, coordination and speech normal      Labs:  Na/K/Cl/CO2:  138/4.0/99/29 (08/06 1451)  BUN/Cr/glu/ALT/AST/amyl/lip:  21/1.7/--/--/--/--/-- (08/06 4095)  WBC/Hgb/Hct/Plts:  11.5/14.0/45.0/227 (08/06 9285)  estimated creatinine clearance is 74 mL/min (A) (based on SCr of 1.7 mg/dL (H)). Other pertinent labs as noted below    Radiology:  XR CHEST (2 VW)   Final Result   Development of a discrete vague ill-defined patchy ground-glass density or   infiltrates in the mid lower aspect of both lungs with some signs of a   perihilar vascular congestion. Cannot exclude underline bilateral pneumonia. Please correlate clinically. CT HEAD WO CONTRAST   Final Result   No skull fracture or acute intracranial abnormality. CTA PULMONARY W CONTRAST   Final Result   No central pulmonary embolism or aortic dissection. The distal subsegmental   and peripheral vessels are poorly evaluated due to poor contrast.      Cardiomegaly with moderate pleural effusion with atelectasis likely CHF and   or pneumonia. 2-4 mm nonspecific pulmonary nodules. Consider surveillance according to   Fleischner society guidelines. XR FOOT RIGHT (MIN 3 VIEWS)   Final Result   Nonspecific edema of the foot. No acute fractures or dislocations in the right foot.          XR CHEST (2 VW)    (Results Pending) Resident Assessment and Plan     1. Worsening shortness of breath 2/2 CHF vs COPD exacerbation  CTA pulmonary w contrast on 08/02: as listed above  CT lung screening on 08/02: as listed above  Patient has history of HFpEF, followed with Dr. Khris Jaquez. Last echo in 2016 (EF 65%)  Patient on 6 L NC at home, 4 L O2 in the car and uses BiPAP at night  Patient is currently on 9 L O2 NC  Unlikely pneumonia as patient is not complaining of any cough or sputum production, no fever, normal WBCs, and procal not significantly elevated  Pulmonary nodules on CT - Pulmonology does not consider that the nodules should be followed at this moment  Suspect CHF due to elevated ProBNP  · Continue 40 mg IV Lasix   · Strict Is and Os  · Pulmonology following - continue oxygen therapy and BiPAP, start Brovana BID, continue Pulmicort BID and Duonebs  · Wean O2 as able. · Echo 08/05: EF 45-50% (last EF 65% in 206), dilated RV and dilated aortic root  · Order CXR today 08/06                2. ARLINE on CKD stage IIIb  Cr 2.1 mg/dL on admission, 1.7 mg/dL today  · FeNa 1.2% and FeUrea 48.1% suspecting an intrinsic renal disease  · Nephro consulted - Dr. Panchito Porras  · Continue to monitor Cr, BUN and GFR  · Hold HCTZ, Losartan, and Metformin                 3.  HTN  Atenolol may contribute to airway constriction. Patient would likely benefit from switching to metoprolol or carvedilol prior to discharge. · Continue atenolol and amlodipine   · Continue to hold HCTZ and Losartan due to ARLINE.   · Consider adding hydralazine if high BB                4. Foot laceration  Large laceration located under the Right 4th toe  · Wound care consulted   · Wound was cleaned and sutured on 08/04  · Podiatry following                5. Type II diabetes   Home regimen: Novolog 20U daily, Novolin 70/30 55U BID  HbA1c (08/03): 6.3  Goal 140-180  · Continue Lantus 30 U BID and Humalog 11 U with meals (pharmacy replacement for patient's home regimen)  · Hold metformin due to ARLINE     6. HLD  · Continue Lipitor     7. GERD  · Continue Protonix      8. Depression  Patient's major motivating factor is his dog  · Continue Trazodone and Remeron  · Hold Gabapentin  · Psychologist, Dr. Brandon Yun, following     9. Hx of Hep C infection  · Awaiting for hep C viral load     10. Hx of cocaine abuse  · Urine Drug test: positive for fentanyl  · Patient denies any recent drug abuse. Endorses a past history of IV cocaine abuse and abuse of various other drugs. Denies ever using heroin.        PT/OT evaluation:ordered  DVT prophylaxis:levonox  GI prophylaxis: protonix  Disposition: home once oxygen better controlled  Diet: normal diet        Electronically signed by Noel Geller MD on 8/7/2021 at 7:38 AM  Attending physician: Dr. Mel Bueno

## 2021-08-07 NOTE — PROGRESS NOTES
Progress Note  NEPHROLOGY    Reason for Consult: Evaluation of acute on chronic kidney disease    From 8/4/21 Consult note - History of Present Ilness: This is a 61 y.o.  male with a H/O CKD stage III followed by Dr. Lorenza Garnett of our practice who now presents to ED on 8/2/2021 with worsening fatigue, shortness of breath and syncope. Admission labs included Pro-BNP 4,152, Procalcitonin 0.10, sodium 142, potassium 5.0, CO2 26, creatinine 2.1, lactic acid 4.1, troponin 24, WBC 10.6. CT of head without contrast showed no skull fracture or acute intracranial abnormality. CTA of chest with contrast showed no central pulmonary embolism or aortic dissection. Chest x-ray showed cardiomegaly with moderate pleural effusion with atelectasis likely CHF and/or pneumonia. 2 -4 mm nonspecific pulmonary nodules. Patient was subsequently admitted for further evaluation and work-up. Interval History:   8/6/21:Sitting on edge of bed, still sob with activity. 8/7: pt seen in room, weak, no cp    Past Medical History:        Diagnosis Date    Anxiety     Asthma     CAD (coronary artery disease) 2008    2 stents in 2008 .  CHF (congestive heart failure) (HCC)     Chills     Chronic sinusitis     Cocaine abuse ([address]     COPD (chronic obstructive pulmonary disease) (HCC)     Depression     Diabetes mellitus (HCC)     on insulin    GERD (gastroesophageal reflux disease)     H/O cardiovascular stress test     Hand fracture 3/2010    Fractured both hands.  Hepatitis C     Work up was negative. Saw specialist. Negative viral load.  History of tobacco abuse     Hyperlipidemia     Hypertension     Night sweats     NSTEMI (non-ST elevation myocardial infarction) ([address] 3/2010    Hospitalized.  Obesity     Oxygen dependent     Panic attacks     Pneumonia 3/2010     LLL.     Sleep apnea     Has CPAP machine    SOB (shortness of breath)     does not know settings       Past Surgical History: Procedure Laterality Date    COLONOSCOPY  2011    COLONOSCOPY  9/23/15   [place] [facility] PROCEDURE  2/25/2009    [facility], cardiac cath/primary BM stent in proximal LAD.     OTHER SURGICAL HISTORY      anal fistula    TONSILLECTOMY         Current Medications:    Current Facility-Administered Medications: sodium chloride (OCEAN, BABY AYR) 0.65 % nasal spray 1 spray, 1 spray, Each Nostril, Q4H PRN  fluticasone (FLONASE) 50 MCG/ACT nasal spray 1 spray, 1 spray, Each Nostril, BID  melatonin tablet 3 mg, 3 mg, Oral, Nightly PRN  furosemide (LASIX) injection 40 mg, 40 mg, Intravenous, BID  insulin glargine (LANTUS) injection vial 30 Units, 30 Units, Subcutaneous, BID  perflutren lipid microspheres (DEFINITY) injection 1.65 mg, 1.5 mL, Intravenous, ONCE PRN  amLODIPine (NORVASC) tablet 10 mg, 10 mg, Oral, Daily  aspirin EC tablet 81 mg, 81 mg, Oral, Daily  atenolol (TENORMIN) tablet 50 mg, 50 mg, Oral, Daily  atorvastatin (LIPITOR) tablet 80 mg, 80 mg, Oral, Nightly  budesonide (PULMICORT) nebulizer suspension 500 mcg, 0.5 mg, Nebulization, BID  mirtazapine (REMERON) tablet 30 mg, 30 mg, Oral, Nightly  pantoprazole (PROTONIX) tablet 40 mg, 40 mg, Oral, QAM AC  sodium chloride flush 0.9 % injection 5-40 mL, 5-40 mL, Intravenous, 2 times per day  sodium chloride flush 0.9 % injection 5-40 mL, 5-40 mL, Intravenous, PRN  0.9 % sodium chloride infusion, 25 mL, Intravenous, PRN  enoxaparin (LOVENOX) injection 40 mg, 40 mg, Subcutaneous, Daily  ondansetron (ZOFRAN-ODT) disintegrating tablet 4 mg, 4 mg, Oral, Q8H PRN **OR** ondansetron (ZOFRAN) injection 4 mg, 4 mg, Intravenous, Q6H PRN  polyethylene glycol (GLYCOLAX) packet 17 g, 17 g, Oral, Daily PRN  acetaminophen (TYLENOL) tablet 650 mg, 650 mg, Oral, Q6H PRN **OR** acetaminophen (TYLENOL) suppository 650 mg, 650 mg, Rectal, Q6H PRN  ipratropium-albuterol (DUONEB) nebulizer solution 1 ampule, 1 ampule, Inhalation, Q4H WA  hydrALAZINE (APRESOLINE) tablet 25 mg, 25 mg, Oral, Q8H PRN  glucose (GLUTOSE) 40 % oral gel 15 g, 15 g, Oral, PRN  dextrose 50 % IV solution, 12.5 g, Intravenous, PRN  glucagon (rDNA) injection 1 mg, 1 mg, Intramuscular, PRN  dextrose 5 % solution, 100 mL/hr, Intravenous, PRN  insulin lispro (HUMALOG) injection vial 11 Units, 11 Units, Subcutaneous, TID WC  glucose (GLUTOSE) 40 % oral gel 15 g, 15 g, Oral, PRN  dextrose 50 % IV solution, 12.5 g, Intravenous, PRN  glucagon (rDNA) injection 1 mg, 1 mg, Intramuscular, PRN  dextrose 5 % solution, 100 mL/hr, Intravenous, PRN    Allergies:  Amoxicillin and Penicillins    Social History:      reports that he quit smoking about 13 years ago. His smoking use included cigarettes. He started smoking about 41 years ago. He has a 76.00 pack-year smoking history. He has never used smokeless tobacco. He reports that he does not drink alcohol and does not use drugs. Family History:     Family History   Problem Relation Age of Onset    Heart Failure Father     Diabetes Mother     Heart Surgery Brother          Review of Systems:       Pertinent positives stated above in HPI. All other systems were reviewed and were negative.     Physical exam:   Constitutional:  VITALS:  /81   Pulse 74   Temp 97 °F (36.1 °C) (Temporal)   Resp 20   Ht 6' (1.829 m)   Wt (!) 370 lb 2 oz (167.9 kg)   SpO2 (!) 88%   BMI 50.20 kg/m²   CURRENT TEMPERATURE:  Temp: 97 °F (36.1 °C)  CURRENT RESPIRATORY RATE:  Resp: 20  CURRENT PULSE:  Pulse: 74  CURRENT BLOOD PRESSURE:  BP: 139/81  24HR BLOOD PRESSURE RANGE:  Systolic (79UWR), CDT:302 , Min:139 , NLB:577   ; Diastolic (27HBE), NJX:92, Min:81, Max:86    24HR INTAKE/OUTPUT:      Intake/Output Summary (Last 24 hours) at 8/7/2021 1223  Last data filed at 8/7/2021 1038  Gross per 24 hour   Intake 360 ml   Output 325 ml   Net 35 ml       General appearance: alert, in no acute distress  Skin: No rashes or lesions on exposed skin  Neck: No JVD  Lungs: Clear, no adventitious sounds  Heart: RRR, no rub  Abdomen: Soft, non-tender, + bowel sounds  Extremities: 1+ BLE edema, dressing to right foot  Neurologic: Mental status: Alert, oriented, thought content appropriate      DATA:      CBC:   Lab Results   Component Value Date    WBC 11.4 08/07/2021    RBC 5.35 08/07/2021    HGB 14.4 08/07/2021    HCT 47.7 08/07/2021    MCV 89.2 08/07/2021    MCH 26.9 08/07/2021    MCHC 30.2 08/07/2021    RDW 15.0 08/07/2021     08/07/2021    MPV 11.5 08/07/2021     BMP:    Lab Results   Component Value Date     08/07/2021    K 3.8 08/07/2021     08/07/2021    CO2 31 08/07/2021    BUN 24 08/07/2021    LABALBU 3.6 08/02/2021    LABALBU 4.2 09/20/2011    CREATININE 1.7 08/07/2021    CALCIUM 9.3 08/07/2021    GFRAA 50 08/07/2021    LABGLOM 41 08/07/2021    GLUCOSE 144 08/07/2021    GLUCOSE 126 09/20/2011       RAD:  XR FOOT RIGHT (MIN 3 VIEWS)    Result Date: 8/2/2021  EXAMINATION: THREE XRAY VIEWS OF THE RIGHT FOOT 8/2/2021 3:02 pm COMPARISON: None. HISTORY: ORDERING SYSTEM PROVIDED HISTORY: foot wound TECHNOLOGIST PROVIDED HISTORY: Reason for exam:->foot wound What reading provider will be dictating this exam?->CRC FINDINGS: There is no evidence of acute fracture. There is normal alignment of the tarsometatarsal joints. No acute joint abnormality. No focal osseous lesion. Diffused edema the right foot. Nonspecific edema of the foot. No acute fractures or dislocations in the right foot. CT HEAD WO CONTRAST    Result Date: 8/2/2021  EXAMINATION: CT OF THE HEAD WITHOUT CONTRAST  8/2/2021 5:23 pm TECHNIQUE: CT of the head was performed without the administration of intravenous contrast. Dose modulation, iterative reconstruction, and/or weight based adjustment of the mA/kV was utilized to reduce the radiation dose to as low as reasonably achievable. COMPARISON: None.  HISTORY: ORDERING SYSTEM PROVIDED HISTORY: LOC TECHNOLOGIST PROVIDED HISTORY: Has a \"code stroke\" or \"stroke alert\" been called? ->No Reason for exam:->LOC Decision Support Exception - unselect if not a suspected or confirmed emergency medical condition->Emergency Medical Condition (MA) What reading provider will be dictating this exam?->CRC FINDINGS: BRAIN/VENTRICLES: No mass effect, edema or hemorrhage is seen. Mild volume loss is seen in the cerebrum with mild chronic microvascular ischemic changes. No hydrocephalus or extra-axial fluid is seen. ORBITS: The visualized portion of the orbits demonstrate no acute abnormality. SINUSES:  A mucous retention cyst is seen in the left maxillary sinus. Mild mucosal thickening in the ethmoid sinuses. The remainder of the visualized paranasal sinuses and the mastoids are grossly clear. SOFT TISSUES/SKULL:  No acute abnormality of the visualized skull or soft tissues. No skull fracture or acute intracranial abnormality. CTA PULMONARY W CONTRAST    Result Date: 8/2/2021  EXAMINATION: CTA OF THE CHEST 8/2/2021 5:23 pm TECHNIQUE: CTA of the chest was performed after the administration of intravenous contrast.  Multiplanar reformatted images are provided for review. MIP images are provided for review. Dose modulation, iterative reconstruction, and/or weight based adjustment of the mA/kV was utilized to reduce the radiation dose to as low as reasonably achievable. COMPARISON: Previous examination on the same day 3 of is prior. HISTORY: ORDERING SYSTEM PROVIDED HISTORY: hypoxic TECHNOLOGIST PROVIDED HISTORY: Reason for exam:->hypoxic Decision Support Exception - unselect if not a suspected or confirmed emergency medical condition->Emergency Medical Condition (MA) What reading provider will be dictating this exam?->CRC FINDINGS: The examination is technically limited due to suboptimal contrast opacification and patient body habitus. Given this limitation, there are no filling defects in the main pulmonary artery and the central branches.   The distal subsegmental and peripheral vessels are poorly opacified and cannot be evaluated for distal subsegmental pulmonary embolism. There is cardiomegaly. Moderately enlarged mediastinal lymph nodes are identified with lymph nodes measuring up to 1.3 cm. Trachea and major bronchi are patent. There is atelectasis and moderate pleural effusion in the lower lobes with ground-glass opacities in the upper lobes. 2-4 mm nonspecific pulmonary nodules are identified in the right upper lobe and right lower lobe without change. The liver is fatty infiltrated. Degenerative changes are identified in the thoracic spine. .     No central pulmonary embolism or aortic dissection. The distal subsegmental and peripheral vessels are poorly evaluated due to poor contrast. Cardiomegaly with moderate pleural effusion with atelectasis likely CHF and or pneumonia. 2-4 mm nonspecific pulmonary nodules. Consider surveillance according to Fleischner society guidelines. CT Lung Screen (Initial/Annual)    Result Date: 2021  EXAMINATION: LOW DOSE SCREENING CT OF THE CHEST WITHOUT CONTRAST 2021 1:24 pm TECHNIQUE: Low dose lung cancer screening CT of the chest was performed without the administration of intravenous contrast.  Multiplanar reformatted images are provided for review. Dose modulation, iterative reconstruction, and/or weight based adjustment of the mA/kV was utilized to reduce the radiation dose to as low as reasonably achievable. COMPARISON: 2020 HISTORY: ORDERING SYSTEM PROVIDED HISTORY: Cigarette nicotine dependence in remission TECHNOLOGIST PROVIDED HISTORY: Age: 61 y.o.  Smoking History: Social History Tobacco Use Smoking status: Former Smoker Packs/day: 2.00 Years: 38.00 Pack years: 68 Types: Cigarettes Start date:  Quit date: 2008 Years since quittin.9 Smokeless tobacco: Never Used Vaping Use Vaping Use: Never used Alcohol use: No Alcohol/week: 0.0 standard drinks Comment: was heavy drinker; quit age 35;  drinks 1-2 cups of coffee daily Drug use: No Comment: Acid, marijuana, cocaine, STOPPED ALL DRUGS 1993 Pack years: 68 Last CT lung screen: 7/6/2020 Is there documentation of shared decision making? ->Yes Does the patient show any signs or symptoms of lung cancer? ->No Is this the first (baseline) CT or an annual exam?->Annual Is this a low dose CT or a routine CT?->Low Dose CT Smoking Status? ->Former Smoker Date quit smoking? (must be within 15 years)->8/5/08 Smoking packs per day?->2 FINDINGS: Mediastinum:  Evaluation of soft tissue structures is severely limited due to patient's body habitus and low-dose technique. There is an enlarged precarinal lymph node measuring 12 mm in short axis. This is new from the prior examination. An AP window lymph node is also at least mildly enlarged measuring 12 mm in short axis. Heart size is within normal limits. Faint coronary artery calcifications are noted, potentially a marker for coronary artery disease. The thoracic esophagus is decompressed, limiting assessment. No obvious esophageal abnormality. Lungs/Pleura: The central airways are patent. No evidence of a pneumothorax. There are small bilateral pleural effusions with bibasilar atelectasis. A 2 mm subpleural right upper lobe pulmonary nodule is noted on axial image 55. A 3-4 mm right middle lobe nodule on axial image 69 is stable. Upper Abdomen:  Evaluation of upper abdominal structures is essentially nondiagnostic due to severe photon starvation artifact. Soft Tissues/Bones: Evaluation of osseous structures is limited. No convincing evidence of acute osseous abnormality. Significantly limited examination as described. There are small bilateral pleural effusions with bibasilar airspace opacities, most in keeping with compressive atelectasis. Several 2-4 mm right-sided pulmonary nodules are felt to be stable. New mediastinal lymphadenopathy, of uncertain etiology or clinical significance.   Given these findings, correlation

## 2021-08-07 NOTE — PROGRESS NOTES
Carlene Fox M.D.,[facility]  Elma Martínez D.O., F.A.C.O.I., Farhan Martin M.D. Ana Lilia Pérez M.D. Shasta Vergara D.O. Daily Pulmonary Progress Note    Patient:  Marci Coates 61 y.o. male MRN: [id number]     Date of Service: 8/7/2021      Synopsis     We are following patient for  COPD, home oxygen     \"CC\" SOB    Code status: FULL      Subjective      Patient was seen and examined. Laying in bed without distress, 90-91 % on 9L HFNC. Patient states he wore his Bipap last night. RN at bedside and educated that patient is okay to be 87-88% and to continue to titrate oxygen. Review of Systems:  Constitutional: Denies fever, weight loss, night sweats, and fatigue  Skin: Denies pigmentation, dark lesions, and rashes   HEENT: Denies hearing loss, tinnitus, ear drainage, epistaxis, sore throat, and hoarseness. Cardiovascular: Denies palpitations, chest pain, and chest pressure. Respiratory: Denies cough, dyspnea at rest, hemoptysis, apnea, and choking.   Gastrointestinal: Denies nausea, vomiting, poor appetite, diarrhea, heartburn or reflux  Genitourinary: Denies dysuria, frequency, urgency or hematuria  Musculoskeletal: Denies myalgias, muscle weakness, and bone pain  Neurological: Denies dizziness, vertigo, headache, and focal weakness  Psychological: Denies anxiety and depression  Endocrine: Denies heat intolerance and cold intolerance  Hematopoietic/Lymphatic: Denies bleeding problems and blood transfusions    24-hour events:  None     Objective   Vitals: BP (!) 160/85   Pulse 79   Temp 97.6 °F (36.4 °C) (Temporal)   Resp 20   Ht 6' (1.829 m)   Wt (!) 370 lb 2 oz (167.9 kg)   SpO2 91%   BMI 50.20 kg/m²     I/O:    Intake/Output Summary (Last 24 hours) at 8/7/2021 1709  Last data filed at 8/7/2021 1316  Gross per 24 hour   Intake 360 ml   Output 325 ml   Net 35 ml       Vent Information  Skin Assessment: Clean, dry, & intact  FiO2 : 80 %  SpO2: 91 %  SpO2/FiO2 ratio: 108.89  I Time/ I Time %: 0.9 s  Mask Type: Full face mask  Mask Size: Medium       IPAP: 15 cmH20  CPAP/EPAP: 10 cmH2O     CURRENT MEDS :  Scheduled Meds:   fluticasone  1 spray Each Nostril BID    furosemide  40 mg Intravenous BID    insulin glargine  30 Units Subcutaneous BID    amLODIPine  10 mg Oral Daily    aspirin  81 mg Oral Daily    atenolol  50 mg Oral Daily    atorvastatin  80 mg Oral Nightly    budesonide  0.5 mg Nebulization BID    mirtazapine  30 mg Oral Nightly    pantoprazole  40 mg Oral QAM AC    sodium chloride flush  5-40 mL Intravenous 2 times per day    enoxaparin  40 mg Subcutaneous Daily    ipratropium-albuterol  1 ampule Inhalation Q4H WA    insulin lispro  11 Units Subcutaneous TID WC       Physical Exam:  General Appearance: appears comfortable in no acute distress. Obese  HEENT: Normocephalic atraumatic without obvious abnormality   Neck: Lips, mucosa, and tongue normal.  Supple, symmetrical, trachea midline, no adenopathy;thyroid:  no enlargement/tenderness/nodules or JVD. Lung: Breath sounds, CTA, diminished. Respirations unlabored. Symmetrical expansion. Heart: RRR, normal S1, S2. No MRG  Abdomen: Soft, NT, ND. BS present x 4 quadrants. No bruit or organomegaly. Rotund  Extremities: Pedal pulses 2+ symmetric b/l. Extremities normal, no cyanosis, clubbing. +1 BLE edema. Laceration under right 2nd toe  Musculokeletal: No joint swelling, no muscle tenderness. ROM normal in all joints of extremities. Neurologic: Mental status: Alert and Oriented X3 .     Pertinent/ New Labs and Imaging Studies     Imaging Personally Reviewed:  CTA pulmonary W contrast 8/2  The examination is technically limited due to suboptimal contrast   opacification and patient body habitus. Ova Minion this limitation, there are no   filling defects in the main pulmonary artery and the central branches.  The   distal subsegmental and peripheral vessels are poorly opacified and cannot be   evaluated for distal subsegmental pulmonary embolism. Gisela Jose is cardiomegaly. Moderately enlarged mediastinal lymph nodes are identified with lymph nodes   measuring up to 1.3 cm.  Trachea and major bronchi are patent.  There is   atelectasis and moderate pleural effusion in the lower lobes with   ground-glass opacities in the upper lobes.  2-4 mm nonspecific pulmonary   nodules are identified in the right upper lobe and right lower lobe without   change.  The liver is fatty infiltrated.  Degenerative changes are identified   in the thoracic spine       CXR 8/6  Development of mild perihilar vascular congestion with discrete patchy   parenchymal opacity in the mid lower aspect of both lungs which can represent   discrete bi basilar infiltrates.  Findings have to be correlated clinically,   can be relate with volume overload but other possibilities including   pneumonia cannot be excluded.  Minimal pleural reaction is seen in the   costophrenic sulcus.  Delete       Heart has upper borderline size.  The mediastinum demonstrate mild to   moderate ectasia of the thoracic aorta. ECHO 8/5/21   Left ventricle is normal in size . Ejection fraction is visually estimated at 45-50%. Overall ejection fraction mildly decreased . Mid to distal anterolateral and apical walls are hypokinetic. Normal left ventricle wall thickness. Indeterminate diastolic function. Moderately dilated right ventricle. Right ventricle global systolic function is mildly reduced . TAPSE 15 mm. No hemodynamically significant aortic stenosis is present. Mildly dilated aortic root (3.7 cm). No evidence for hemodynamically significant pericardial effusion. Technically difficult examination. Definity echo contrast was used to   delineate endocardial borders.       Signature      ----------------------------------------------------------------   Electronically signed by Maddie Quiroz MD(Interpreting   physician) on 08/05/2021 08:32 PM      Labs:  Lab Results   Component Value Date    WBC 11.4 08/07/2021    HGB 14.4 08/07/2021    HCT 47.7 08/07/2021    MCV 89.2 08/07/2021    MCH 26.9 08/07/2021    MCHC 30.2 08/07/2021    RDW 15.0 08/07/2021     08/07/2021    MPV 11.5 08/07/2021     Lab Results   Component Value Date     08/07/2021    K 3.8 08/07/2021     08/07/2021    CO2 31 08/07/2021    BUN 24 08/07/2021    CREATININE 1.7 08/07/2021    LABALBU 3.6 08/02/2021    LABALBU 4.2 09/20/2011    CALCIUM 9.3 08/07/2021    GFRAA 50 08/07/2021    LABGLOM 41 08/07/2021     Lab Results   Component Value Date    PROTIME 13.0 02/22/2014    INR 1.2 02/22/2014     No results for input(s): PROBNP in the last 72 hours. Recent Labs     08/07/21  0732   PROCAL 0.09*     This SmartLink has not been configured with any valid records. Micro:  Respiratory panel negative     Assessment:    1. Acute on chronic respiratory failure with hypoxia and hypercapnia  2. Syncopal episode  3. Obesity hypoventilation syndrome   4. Restrictive ventilatory pattern, last PFTs 2014  5. Pulmonary hypertension WHO group 2  6. Small bilateral pleural effusions on CTA  7. Heart failure with preserved EF  8. ALEXIS treated with BiPAP, Chronic home O2 3-6L NC  9. COPD/emphysema  10. Morbid obesity BMI 48  11. Nicotine dependence in remission  12. History of polysubstance abuse. Sober since 1993  13. Pulmonary nodule 2-4mm stable      Plan:   1. Oxygen therapy, 9L HFNC, titrate to keep SpO2 >87%  2. Bipap HS and PRN at home settings of 15/10, FiO2 decreased to 80%. 3. Recruitment techniques, incentive spirometer  4. Continue Pulmicort BID and Duonebs Q4H WA  5. ECHO with bubble study reviewed. Recommend cardiology consult  6. Lasix increased to 40 mg IV BID per nephrology. 7. reconsult dietician for weight management and diet education. BMI 48  8. GI/DVT prophylaxis  9. Will need ambulatory pulse ox testing done prior to discharge  10.  Per Fleischner guidelines, no more follow-ups required for pulmonary nodule  11.  Will need PFTs as outpatient       Electronically signed by Rashmi Lopez MD on 8/7/2021 at 5:09 PM

## 2021-08-07 NOTE — PROGRESS NOTES
Department of Podiatry  Progress Note    SUBJECTIVE:  Mr. Valerie Carvajal was evaluated at bedside this morning for RLE fourth digit laceration. No acute events overnight. Patient denies any N/V/D/F/C and has no other pedal complaints at this time. Patient states that he has not received a surgical shoe yet for R foot though. OBJECTIVE:    Scheduled Meds:   fluticasone  1 spray Each Nostril BID    furosemide  40 mg Intravenous BID    insulin glargine  30 Units Subcutaneous BID    amLODIPine  10 mg Oral Daily    aspirin  81 mg Oral Daily    atenolol  50 mg Oral Daily    atorvastatin  80 mg Oral Nightly    budesonide  0.5 mg Nebulization BID    mirtazapine  30 mg Oral Nightly    pantoprazole  40 mg Oral QAM AC    sodium chloride flush  5-40 mL Intravenous 2 times per day    enoxaparin  40 mg Subcutaneous Daily    ipratropium-albuterol  1 ampule Inhalation Q4H WA    insulin lispro  11 Units Subcutaneous TID WC     Continuous Infusions:   sodium chloride      dextrose      dextrose       PRN Meds:.sodium chloride, melatonin, perflutren lipid microspheres, sodium chloride flush, sodium chloride, ondansetron **OR** ondansetron, polyethylene glycol, acetaminophen **OR** acetaminophen, hydrALAZINE, glucose, dextrose, glucagon (rDNA), dextrose, glucose, dextrose, glucagon (rDNA), dextrose    Allergies   Allergen Reactions    Amoxicillin Hives and Shortness Of Breath    Penicillins Hives and Shortness Of Breath     TRIAMOX       /81   Pulse 74   Temp 97 °F (36.1 °C) (Temporal)   Resp 20   Ht 6' (1.829 m)   Wt (!) 370 lb 2 oz (167.9 kg)   SpO2 (!) 88%   BMI 50.20 kg/m²       EXAM:  Dressing left c/d/i; no strikethough drainage. Previous exam findings:  VASCULAR:  DP and PT pulses are faint, possibly secondary to edema. CFT delayed B/L. Warm to warm from the tibial tuberosity to the distal aspect of the digits dorsally.  No hair growth noted to the distal aspects central pulmonary embolism or aortic dissection. The distal subsegmental   and peripheral vessels are poorly evaluated due to poor contrast.      Cardiomegaly with moderate pleural effusion with atelectasis likely CHF and   or pneumonia. 2-4 mm nonspecific pulmonary nodules. Consider surveillance according to   Fleischner society guidelines. XR FOOT RIGHT (MIN 3 VIEWS)   Final Result   Nonspecific edema of the foot. No acute fractures or dislocations in the right foot. XR CHEST (2 VW)    (Results Pending)     /81   Pulse 74   Temp 97 °F (36.1 °C) (Temporal)   Resp 20   Ht 6' (1.829 m)   Wt (!) 370 lb 2 oz (167.9 kg)   SpO2 (!) 88%   BMI 50.20 kg/m²     LABS:    Recent Labs     08/06/21  0641 08/07/21  0732   WBC 11.5 11.4   HGB 14.0 14.4   HCT 45.0 47.7    256        Recent Labs     08/07/21  0732      K 3.8      CO2 31*   BUN 24*   CREATININE 1.7*        No results for input(s): PROT, INR, APTT in the last 72 hours. ASSESSMENT:    1. RLE laceration -POA  2. RLE Trauma  3. RLE edema, pain  4. Falls  5. DM with neuropathic changes     Shortness of breath    Morbid obesity    Emphysema/COPD    Hypertension    Diabetes mellitus type 2 in obese ([facility])    CKD (chronic kidney disease) stage 3, GFR 30-59 ml/min ([facility])    Depression    CAD (coronary artery disease)    Pneumonia    Foot laceration    Hypoxia              PLAN:     - Patient was examined and evaluated. Reviewed patient's recent lab results and pertinent diagnostic imaging. Reviewed ancillary service notes. - Patient had laceration sutured previously at bedside earlier in admission. Dressings this morning were yesterday: Betadine, Xeroform, DSD. Will continue QoD dressing changes with betadine, xeroform, DSD, next change tomorrow 8/8  - Antibiotics as per ID/IM   - X-rays: 1.Edema              2.No acute fractures or dislocations  - Prevalon boots ordered.  No noted today  - Surgical shoe

## 2021-08-07 NOTE — PLAN OF CARE
Problem: Falls - Risk of:  Goal: Will remain free from falls  Description: Will remain free from falls  Outcome: Met This Shift  Goal: Absence of physical injury  Description: Absence of physical injury  Outcome: Met This Shift     Problem: Skin Integrity:  Goal: Will show no infection signs and symptoms  Description: Will show no infection signs and symptoms  Outcome: Met This Shift  Goal: Absence of new skin breakdown  Description: Absence of new skin breakdown  Outcome: Met This Shift     Problem: Increased nutrient needs (NI-5.1)  Goal: Food and/or Nutrient Delivery  Description: Individualized approach for food/nutrient provision.   8/6/2021 1535 by Seth Lester RD, LD  Outcome: Met This Shift     Problem: Pain:  Goal: Pain level will decrease  Description: Pain level will decrease  Outcome: Met This Shift  Goal: Control of acute pain  Description: Control of acute pain  Outcome: Met This Shift  Goal: Control of chronic pain  Description: Control of chronic pain  Outcome: Met This Shift

## 2021-08-07 NOTE — PROGRESS NOTES
[facility] Attending    S: 61 y.o. male with a history of chronic respiratory failure on 4-6 L of oxygen at home, COPD with bipap at night, htn, CKD 3b, Dm2 (a1c 6.3), CAD s/p stent, MDD, anxiety, gerd, bph, HFpEF, tobacco/remote cocaine use, obesity, and + hcv ab who presented after shopping in [facility] without the use of his oxygen. He walked to the car and he passed out. He had no cp, palpitations or seizure-like activity. He was short of breath before and walking throughout the store. He was told his lips were blue. Also with increasing dyspnea over the past few months. His oxygen saturation was80% on 4L in the ER. He also was found to have an ARLINE on CKD. He was placed on a NRB mask in the ER. This morning, he states his breathing continues to improve. Denies any pain. No significant swelling. Reports he occasionally takes off his oxygen to sneeze and blow his nose. O: VS- Blood pressure (!) 160/85, pulse 79, temperature 97.6 °F (36.4 °C), temperature source Temporal, resp. rate 20, height 6' (1.829 m), weight (!) 370 lb 2 oz (167.9 kg), SpO2 90 %. Exam is as noted by resident with the following changes, additions or corrections:  Gen: NAD on 8 L NC  HEENT: NCAT, PERRL  CV-RRR no M/R/G   Lungs-clear  ABD-soft nonttp no masses   Ext-no C/C; tr pitting edema from his feet to his legs, improved  Laceration between his second and third right toe    Impressions:   Principal Problem:    Shortness of breath  Active Problems: Morbid obesity    Emphysema/COPD    Hypertension    Diabetes mellitus type 2 in obese ([facility])    CKD (chronic kidney disease) stage 3, GFR 30-59 ml/min ([facility])    Depression    CAD (coronary artery disease)    Pneumonia    Foot laceration    Hypoxia  Resolved Problems:    * No resolved hospital problems. *      Plan:  Pt requiring increased oxygen with acute on chronic respiratory failure. CT with pleural effusions, doubt infection.  IV Lasix, echo noted. Diastolic function was unable to be evaluated, but nl EF. Elevated BNP. Follow I and O for hx of HFpEF and suspected worsening along with the effusions/copd. Also on nebs. Adjust insulin for bg. Metformin on hold. Evaristo on ckd. Urine lytes show intrinsic etiology. Appreciate renal consult. Will need tobacco cessation. PT/OT  Podiatry following pt and placed 4-5 sutures rt interdigital space  Follow up outpatient with Dr. Leobardo Mata for depression  Discuss compliance with use of oxygen on discharge at home  Disposition planning for the next few days to home. Strict I/os     Attending Physician Statement  I have reviewed the chart and seen the patient with the resident(s). I personally reviewed images, EKG's and similar tests, if present. I personally reviewed and performed key elements of the history and exam.  I have reviewed and confirmed student and/or resident history and exam with changes as indicated above. I agree with the assessment, plan and orders as documented by the resident. Please refer to the resident and/or student note for additional information. Dany Coreas.  Aury Tello MD

## 2021-08-08 NOTE — PROGRESS NOTES
diminished b/l     DERM:  Laceration site noted to the medial aspect of the fourth right digit. Laceration was sutured previously. Sutures intact and in intended position. No noted dehiscence. Coapting as expected, skin edges well adhered. No noted pus or drainage. No SOI  Ecchymosis noted to digits 2-4 on RLE, but resolving. Edema noted to RLE-decreased since last evaluated. As pictured below:     MUSCULOSKELETAL: MMT Deferred. Equinus b/l           Scheduled Meds:   fluticasone  1 spray Each Nostril BID    furosemide  40 mg Intravenous BID    insulin glargine  30 Units Subcutaneous BID    amLODIPine  10 mg Oral Daily    aspirin  81 mg Oral Daily    atenolol  50 mg Oral Daily    atorvastatin  80 mg Oral Nightly    budesonide  0.5 mg Nebulization BID    mirtazapine  30 mg Oral Nightly    pantoprazole  40 mg Oral QAM AC    sodium chloride flush  5-40 mL Intravenous 2 times per day    enoxaparin  40 mg Subcutaneous Daily    ipratropium-albuterol  1 ampule Inhalation Q4H WA    insulin lispro  11 Units Subcutaneous TID WC     Continuous Infusions:   sodium chloride      dextrose      dextrose       PRN Meds:.sodium chloride, melatonin, perflutren lipid microspheres, sodium chloride flush, sodium chloride, ondansetron **OR** ondansetron, polyethylene glycol, acetaminophen **OR** acetaminophen, hydrALAZINE, glucose, dextrose, glucagon (rDNA), dextrose, glucose, dextrose, glucagon (rDNA), dextrose    RADIOLOGY:  XR CHEST (2 VW)   Final Result   Development of a discrete vague ill-defined patchy ground-glass density or   infiltrates in the mid lower aspect of both lungs with some signs of a   perihilar vascular congestion. Cannot exclude underline bilateral pneumonia. Please correlate clinically. CT HEAD WO CONTRAST   Final Result   No skull fracture or acute intracranial abnormality. CTA PULMONARY W CONTRAST   Final Result   No central pulmonary embolism or aortic dissection.   The distal subsegmental   and peripheral vessels are poorly evaluated due to poor contrast.      Cardiomegaly with moderate pleural effusion with atelectasis likely CHF and   or pneumonia. 2-4 mm nonspecific pulmonary nodules. Consider surveillance according to   Fleischner society guidelines. XR FOOT RIGHT (MIN 3 VIEWS)   Final Result   Nonspecific edema of the foot. No acute fractures or dislocations in the right foot. XR CHEST (2 VW)    (Results Pending)     /68   Pulse 80   Temp 97.3 °F (36.3 °C) (Temporal)   Resp 20   Ht 6' (1.829 m)   Wt (!) 379 lb 1 oz (171.9 kg)   SpO2 (!) 86%   BMI 51.41 kg/m²     LABS:    Recent Labs     08/07/21  0732 08/08/21  1024   WBC 11.4 10.7   HGB 14.4 14.4   HCT 47.7 46.0    250        Recent Labs     08/08/21  1024      K 4.1      CO2 27   BUN 27*   CREATININE 1.8*        No results for input(s): PROT, INR, APTT in the last 72 hours. ASSESSMENT:    1. RLE laceration -POA  2. RLE Trauma  3. RLE edema, pain  4. Falls  5. DM with neuropathic changes     Shortness of breath    Morbid obesity    Emphysema/COPD    Hypertension    Diabetes mellitus type 2 in obese ([facility])    CKD (chronic kidney disease) stage 3, GFR 30-59 ml/min ([facility])    Depression    CAD (coronary artery disease)    Pneumonia    Foot laceration    Hypoxia              PLAN:     - Patient was examined and evaluated. Reviewed patient's recent lab results and pertinent diagnostic imaging. Reviewed ancillary service notes. - Patient had laceration sutured previously at bedside earlier in admission. Dressing may be changed tomorrow; discussed with RN that patient spilled some water on the floor and his sock had gotten wet. Outer layer ACE/kerlix may be changed today if it is wet. - Antibiotics as per ID/IM   - X-rays: 1.Edema              2.No acute fractures or dislocations  - Prevalon boots ordered.  No noted today  - Surgical shoe ordered; to be worn to R foot for partial (50%) weightbearing to heel  - Discussed patient with Dr. Bhavana Griffin   - Will continue to follow patient while they are in-house. Patient should follow up in office within one week of discharge

## 2021-08-08 NOTE — PROGRESS NOTES
Progress Note  NEPHROLOGY    Reason for Consult: Evaluation of acute on chronic kidney disease    From 8/4/21 Consult note - History of Present Ilness: This is a 61 y.o.  male with a H/O CKD stage III followed by Dr. Adam Alba of our practice who now presents to ED on 8/2/2021 with worsening fatigue, shortness of breath and syncope. Admission labs included Pro-BNP 4,152, Procalcitonin 0.10, sodium 142, potassium 5.0, CO2 26, creatinine 2.1, lactic acid 4.1, troponin 24, WBC 10.6. CT of head without contrast showed no skull fracture or acute intracranial abnormality. CTA of chest with contrast showed no central pulmonary embolism or aortic dissection. Chest x-ray showed cardiomegaly with moderate pleural effusion with atelectasis likely CHF and/or pneumonia. 2 -4 mm nonspecific pulmonary nodules. Patient was subsequently admitted for further evaluation and work-up. Interval History:   8/6/21:Sitting on edge of bed, still sob with activity. 8/7: pt seen in room, weak, no cp  8/8: pt seen in room, no sob today    Past Medical History:        Diagnosis Date    Anxiety     Asthma     CAD (coronary artery disease) 2008    2 stents in 2008 .  CHF (congestive heart failure) (HCC)     Chills     Chronic sinusitis     Cocaine abuse ([address]     COPD (chronic obstructive pulmonary disease) (HCC)     Depression     Diabetes mellitus (HCC)     on insulin    GERD (gastroesophageal reflux disease)     H/O cardiovascular stress test     Hand fracture 3/2010    Fractured both hands.  Hepatitis C     Work up was negative. Saw specialist. Negative viral load.  History of tobacco abuse     Hyperlipidemia     Hypertension     Night sweats     NSTEMI (non-ST elevation myocardial infarction) ([address] 3/2010    Hospitalized.  Obesity     Oxygen dependent     Panic attacks     Pneumonia 3/2010     LLL.     Sleep apnea     Has CPAP machine    SOB (shortness of breath)     does not know settings       Past Surgical History:        Procedure Laterality Date    COLONOSCOPY  2011    COLONOSCOPY  9/23/15   [facility]ATH [facility] PROCEDURE  2/25/2009    [facility], cardiac cath/primary BM stent in proximal LAD.     OTHER SURGICAL HISTORY      anal fistula    TONSILLECTOMY         Current Medications:    Current Facility-Administered Medications: sodium chloride (OCEAN, BABY AYR) 0.65 % nasal spray 1 spray, 1 spray, Each Nostril, Q4H PRN  fluticasone (FLONASE) 50 MCG/ACT nasal spray 1 spray, 1 spray, Each Nostril, BID  melatonin tablet 3 mg, 3 mg, Oral, Nightly PRN  furosemide (LASIX) injection 40 mg, 40 mg, Intravenous, BID  insulin glargine (LANTUS) injection vial 30 Units, 30 Units, Subcutaneous, BID  perflutren lipid microspheres (DEFINITY) injection 1.65 mg, 1.5 mL, Intravenous, ONCE PRN  amLODIPine (NORVASC) tablet 10 mg, 10 mg, Oral, Daily  aspirin EC tablet 81 mg, 81 mg, Oral, Daily  atenolol (TENORMIN) tablet 50 mg, 50 mg, Oral, Daily  atorvastatin (LIPITOR) tablet 80 mg, 80 mg, Oral, Nightly  budesonide (PULMICORT) nebulizer suspension 500 mcg, 0.5 mg, Nebulization, BID  mirtazapine (REMERON) tablet 30 mg, 30 mg, Oral, Nightly  pantoprazole (PROTONIX) tablet 40 mg, 40 mg, Oral, QAM AC  sodium chloride flush 0.9 % injection 5-40 mL, 5-40 mL, Intravenous, 2 times per day  sodium chloride flush 0.9 % injection 5-40 mL, 5-40 mL, Intravenous, PRN  0.9 % sodium chloride infusion, 25 mL, Intravenous, PRN  enoxaparin (LOVENOX) injection 40 mg, 40 mg, Subcutaneous, Daily  ondansetron (ZOFRAN-ODT) disintegrating tablet 4 mg, 4 mg, Oral, Q8H PRN **OR** ondansetron (ZOFRAN) injection 4 mg, 4 mg, Intravenous, Q6H PRN  polyethylene glycol (GLYCOLAX) packet 17 g, 17 g, Oral, Daily PRN  acetaminophen (TYLENOL) tablet 650 mg, 650 mg, Oral, Q6H PRN **OR** acetaminophen (TYLENOL) suppository 650 mg, 650 mg, Rectal, Q6H PRN  ipratropium-albuterol (DUONEB) nebulizer solution 1 ampule, 1 ampule, Inhalation, Q4H WA  hydrALAZINE (APRESOLINE) tablet 25 mg, 25 mg, Oral, Q8H PRN  glucose (GLUTOSE) 40 % oral gel 15 g, 15 g, Oral, PRN  dextrose 50 % IV solution, 12.5 g, Intravenous, PRN  glucagon (rDNA) injection 1 mg, 1 mg, Intramuscular, PRN  dextrose 5 % solution, 100 mL/hr, Intravenous, PRN  insulin lispro (HUMALOG) injection vial 11 Units, 11 Units, Subcutaneous, TID WC  glucose (GLUTOSE) 40 % oral gel 15 g, 15 g, Oral, PRN  dextrose 50 % IV solution, 12.5 g, Intravenous, PRN  glucagon (rDNA) injection 1 mg, 1 mg, Intramuscular, PRN  dextrose 5 % solution, 100 mL/hr, Intravenous, PRN    Allergies:  Amoxicillin and Penicillins    Social History:      reports that he quit smoking about 13 years ago. His smoking use included cigarettes. He started smoking about 41 years ago. He has a 76.00 pack-year smoking history. He has never used smokeless tobacco. He reports that he does not drink alcohol and does not use drugs. Family History:     Family History   Problem Relation Age of Onset    Heart Failure Father     Diabetes Mother     Heart Surgery Brother          Review of Systems:       Pertinent positives stated above in HPI. All other systems were reviewed and were negative.     Physical exam:   Constitutional:  VITALS:  /68   Pulse 80   Temp 97.3 °F (36.3 °C) (Temporal)   Resp 20   Ht 6' (1.829 m)   Wt (!) 379 lb 1 oz (171.9 kg)   SpO2 (!) 86%   BMI 51.41 kg/m²   CURRENT TEMPERATURE:  Temp: 97.3 °F (36.3 °C)  CURRENT RESPIRATORY RATE:  Resp: 20  CURRENT PULSE:  Pulse: 80  CURRENT BLOOD PRESSURE:  BP: 116/68  24HR BLOOD PRESSURE RANGE:  Systolic (83JBE), COLEEN:931 , Min:113 , ZLZ:290   ; Diastolic (05VKX), BYM:67, Min:61, Max:85    24HR INTAKE/OUTPUT:      Intake/Output Summary (Last 24 hours) at 8/8/2021 1124  Last data filed at 8/8/2021 0909  Gross per 24 hour   Intake 360 ml   Output 400 ml   Net -40 ml       General appearance: alert, in no acute distress  Skin: No rashes or lesions on exposed skin  Neck: No JVD  Lungs: Clear, no adventitious sounds  Heart: RRR, no rub  Abdomen: Soft, non-tender, + bowel sounds  Extremities: 1+ BLE edema, dressing to right foot  Neurologic: Mental status: Alert, oriented, thought content appropriate      DATA:      CBC:   Lab Results   Component Value Date    WBC 11.4 08/07/2021    RBC 5.35 08/07/2021    HGB 14.4 08/07/2021    HCT 47.7 08/07/2021    MCV 89.2 08/07/2021    MCH 26.9 08/07/2021    MCHC 30.2 08/07/2021    RDW 15.0 08/07/2021     08/07/2021    MPV 11.5 08/07/2021     BMP:    Lab Results   Component Value Date     08/07/2021    K 3.8 08/07/2021     08/07/2021    CO2 31 08/07/2021    BUN 24 08/07/2021    LABALBU 3.6 08/02/2021    LABALBU 4.2 09/20/2011    CREATININE 1.7 08/07/2021    CALCIUM 9.3 08/07/2021    GFRAA 50 08/07/2021    LABGLOM 41 08/07/2021    GLUCOSE 144 08/07/2021    GLUCOSE 126 09/20/2011       RAD:  XR FOOT RIGHT (MIN 3 VIEWS)    Result Date: 8/2/2021  EXAMINATION: THREE XRAY VIEWS OF THE RIGHT FOOT 8/2/2021 3:02 pm COMPARISON: None. HISTORY: ORDERING SYSTEM PROVIDED HISTORY: foot wound TECHNOLOGIST PROVIDED HISTORY: Reason for exam:->foot wound What reading provider will be dictating this exam?->CRC FINDINGS: There is no evidence of acute fracture. There is normal alignment of the tarsometatarsal joints. No acute joint abnormality. No focal osseous lesion. Diffused edema the right foot. Nonspecific edema of the foot. No acute fractures or dislocations in the right foot. CT HEAD WO CONTRAST    Result Date: 8/2/2021  EXAMINATION: CT OF THE HEAD WITHOUT CONTRAST  8/2/2021 5:23 pm TECHNIQUE: CT of the head was performed without the administration of intravenous contrast. Dose modulation, iterative reconstruction, and/or weight based adjustment of the mA/kV was utilized to reduce the radiation dose to as low as reasonably achievable. COMPARISON: None.  HISTORY: ORDERING SYSTEM PROVIDED HISTORY: LOC TECHNOLOGIST PROVIDED HISTORY: Has a \"code stroke\" or \"stroke alert\" been called? ->No Reason for exam:->LOC Decision Support Exception - unselect if not a suspected or confirmed emergency medical condition->Emergency Medical Condition (MA) What reading provider will be dictating this exam?->CRC FINDINGS: BRAIN/VENTRICLES: No mass effect, edema or hemorrhage is seen. Mild volume loss is seen in the cerebrum with mild chronic microvascular ischemic changes. No hydrocephalus or extra-axial fluid is seen. ORBITS: The visualized portion of the orbits demonstrate no acute abnormality. SINUSES:  A mucous retention cyst is seen in the left maxillary sinus. Mild mucosal thickening in the ethmoid sinuses. The remainder of the visualized paranasal sinuses and the mastoids are grossly clear. SOFT TISSUES/SKULL:  No acute abnormality of the visualized skull or soft tissues. No skull fracture or acute intracranial abnormality. CTA PULMONARY W CONTRAST    Result Date: 8/2/2021  EXAMINATION: CTA OF THE CHEST 8/2/2021 5:23 pm TECHNIQUE: CTA of the chest was performed after the administration of intravenous contrast.  Multiplanar reformatted images are provided for review. MIP images are provided for review. Dose modulation, iterative reconstruction, and/or weight based adjustment of the mA/kV was utilized to reduce the radiation dose to as low as reasonably achievable. COMPARISON: Previous examination on the same day 3 of is prior. HISTORY: ORDERING SYSTEM PROVIDED HISTORY: hypoxic TECHNOLOGIST PROVIDED HISTORY: Reason for exam:->hypoxic Decision Support Exception - unselect if not a suspected or confirmed emergency medical condition->Emergency Medical Condition (MA) What reading provider will be dictating this exam?->CRC FINDINGS: The examination is technically limited due to suboptimal contrast opacification and patient body habitus. Given this limitation, there are no filling defects in the main pulmonary artery and the central branches.   The distal subsegmental and peripheral vessels are poorly opacified and cannot be evaluated for distal subsegmental pulmonary embolism. There is cardiomegaly. Moderately enlarged mediastinal lymph nodes are identified with lymph nodes measuring up to 1.3 cm. Trachea and major bronchi are patent. There is atelectasis and moderate pleural effusion in the lower lobes with ground-glass opacities in the upper lobes. 2-4 mm nonspecific pulmonary nodules are identified in the right upper lobe and right lower lobe without change. The liver is fatty infiltrated. Degenerative changes are identified in the thoracic spine. .     No central pulmonary embolism or aortic dissection. The distal subsegmental and peripheral vessels are poorly evaluated due to poor contrast. Cardiomegaly with moderate pleural effusion with atelectasis likely CHF and or pneumonia. 2-4 mm nonspecific pulmonary nodules. Consider surveillance according to Fleischner society guidelines. CT Lung Screen (Initial/Annual)    Result Date: 2021  EXAMINATION: LOW DOSE SCREENING CT OF THE CHEST WITHOUT CONTRAST 2021 1:24 pm TECHNIQUE: Low dose lung cancer screening CT of the chest was performed without the administration of intravenous contrast.  Multiplanar reformatted images are provided for review. Dose modulation, iterative reconstruction, and/or weight based adjustment of the mA/kV was utilized to reduce the radiation dose to as low as reasonably achievable. COMPARISON: 2020 HISTORY: ORDERING SYSTEM PROVIDED HISTORY: Cigarette nicotine dependence in remission TECHNOLOGIST PROVIDED HISTORY: Age: 61 y.o.  Smoking History: Social History Tobacco Use Smoking status: Former Smoker Packs/day: 2.00 Years: 38.00 Pack years: 68 Types: Cigarettes Start date:  Quit date: 2008 Years since quittin.9 Smokeless tobacco: Never Used Vaping Use Vaping Use: Never used Alcohol use: No Alcohol/week: 0.0 standard drinks Comment: was heavy drinker; quit age 35;  drinks 1-2 cups of coffee daily Drug use: No Comment: Acid, marijuana, cocaine, STOPPED ALL DRUGS 1993 Pack years: 68 Last CT lung screen: 7/6/2020 Is there documentation of shared decision making? ->Yes Does the patient show any signs or symptoms of lung cancer? ->No Is this the first (baseline) CT or an annual exam?->Annual Is this a low dose CT or a routine CT?->Low Dose CT Smoking Status? ->Former Smoker Date quit smoking? (must be within 15 years)->8/5/08 Smoking packs per day?->2 FINDINGS: Mediastinum:  Evaluation of soft tissue structures is severely limited due to patient's body habitus and low-dose technique. There is an enlarged precarinal lymph node measuring 12 mm in short axis. This is new from the prior examination. An AP window lymph node is also at least mildly enlarged measuring 12 mm in short axis. Heart size is within normal limits. Faint coronary artery calcifications are noted, potentially a marker for coronary artery disease. The thoracic esophagus is decompressed, limiting assessment. No obvious esophageal abnormality. Lungs/Pleura: The central airways are patent. No evidence of a pneumothorax. There are small bilateral pleural effusions with bibasilar atelectasis. A 2 mm subpleural right upper lobe pulmonary nodule is noted on axial image 55. A 3-4 mm right middle lobe nodule on axial image 69 is stable. Upper Abdomen:  Evaluation of upper abdominal structures is essentially nondiagnostic due to severe photon starvation artifact. Soft Tissues/Bones: Evaluation of osseous structures is limited. No convincing evidence of acute osseous abnormality. Significantly limited examination as described. There are small bilateral pleural effusions with bibasilar airspace opacities, most in keeping with compressive atelectasis. Several 2-4 mm right-sided pulmonary nodules are felt to be stable. New mediastinal lymphadenopathy, of uncertain etiology or clinical significance.   Given

## 2021-08-08 NOTE — PROGRESS NOTES
Date: 8/8/2021    Time: 12:39 AM    Patient Placed On BIPAP/CPAP/ Non-Invasive Ventilation? No    If no must comment. Remains on  Facial area red/color change? No           If YES are Blister/Lesion present? No   If yes must notify nursing staff  BIPAP/CPAP skin barrier?   Yes    Skin barrier type:mepilexlite       Comments:        Nikita Ramirez RCP

## 2021-08-08 NOTE — CONSULTS
Inpatient Cardiology Consultation      Reason for Consult: Mid to distal anterolateral and apical walls are hypokinetic on Echo, SOB    Consulting Physician: Mitesh Figueroa MD    Requesting Physician:  Amanda Reddy MD    Date of Consultation: 8/8/2021    HISTORY OF PRESENT ILLNESS OF Lenard Arora located in  room [id number]/7406-A:     Lenard Arora is a 61 y.o. male  known to Dr. Edwin Cronin MD, last seen by Dr. Tracy Patel on 3/29/2021 in office    Patient has h/o CAD, s/p PCI to LAD in 2009, ischemic cardiomyopathy EF 45-50% (ECHO 8/5/2021), grade 2 diastolic dysfunction, Bifascicular block with first-degree AV block., HTN, HLD, COPD on home O2, ALEXIS on CPAP, DM type II, CKD, GERD,h/o  hepatitis C antibody positive, h/o cocaine abuse, depression, BPH, morbid obese BMI 51.41 kg/m²    Last stress test 1/4/2016. Patient presented to ED of [facility] on 8/2/21 complaining of shortness of breath and some cough. He was short of breath more than usual on ambulation despite being on home oxygen. Being on blood thinning medication she has a syncopal episode over the weekend. She had a residual right foot pain after syncopal collapse. Denied cough fevers or chills. He was admitted with diagnosis: Syncope and collapse; pneumonia  Differential diagnosis for shortness of breath included pneumonia versus CHF versus pulmonary renal syndrome. Past Medical History:    Past Medical History:   Diagnosis Date    Anxiety     Asthma     CAD (coronary artery disease) 2008    2 stents in 2008 .  CHF (congestive heart failure) (HCC)     Chills     Chronic sinusitis     Cocaine abuse ([facility] [address]     COPD (chronic obstructive pulmonary disease) (HCC)     Depression     Diabetes mellitus (HCC)     on insulin    GERD (gastroesophageal reflux disease)     H/O cardiovascular stress test     Hand fracture 3/2010    Fractured both hands.  Hepatitis C     Work up was negative. Saw specialist. Negative viral load.      History of tablet by mouth once daily 6/4/21  Yes Bridget A Rech, DO   omeprazole (PRILOSEC) 20 MG delayed release capsule Take 1 capsule by mouth daily 6/4/21  Yes Bridget A Rech, DO   atenolol (TENORMIN) 50 MG tablet take 1 tablet by mouth twice a day 6/4/21  Yes Bridget A Rech, DO   metFORMIN (GLUCOPHAGE) 1000 MG tablet take 1 tablet by mouth twice a day with meals 6/4/21  Yes Bridget A Rech, DO   hydroCHLOROthiazide (HYDRODIURIL) 25 MG tablet take 1 tablet by mouth once daily 6/4/21  Yes Bridget A Rech, DO   tamsulosin (FLOMAX) 0.4 MG capsule take 1 capsule by mouth once daily 6/4/21  Yes Bridget A Sangita, DO   Lancets MISC Check sugars as directed.  [id number] Monie Nguyen for pharmacy to substitute 6/4/21  Yes Bridget Quesada, DO   Insulin Syringes, Disposable, U-100 1 ML MISC 1 each by Does not apply route daily 6/4/21  Yes Bridget A Sangita, DO   losartan (COZAAR) 25 MG tablet take 1 tablet by mouth once daily 5/17/21  Yes Bridget A Sangita, DO   Blood Glucose Monitoring Suppl (ONE TOUCH ULTRA 2) w/Device KIT 1 kit by Does not apply route daily 3/12/21  Yes Bridget Quesada, DO   blood glucose test strips (ASCENSIA AUTODISC VI;ONE TOUCH ULTRA TEST VI) strip 1 each by Does not apply route 3 times daily 3/10/21  Yes Bridget A Sangita, DO   docusate sodium (DOCQLACE) 100 MG capsule Take 1 capsule by mouth 2 times daily 2/25/21  Yes Bridget A Sangita, DO   mirtazapine (REMERON) 30 MG tablet take 1 tablet by mouth every evening 2/25/21  Yes Bridget A Sangita, DO   aspirin 81 MG EC tablet Take 1 tablet by mouth daily 8/25/20  Yes Bridget A Sangita, DO   Omega-3 Fatty Acids (FISH OIL) 1000 MG CAPS Take 1 capsule by mouth daily 8/25/20  Yes Jade Velásquez DO   Handicap Placard MISC by Does not apply route 6/12/20  Yes Layla Dunn MD   glucose monitoring kit (FREESTYLE) monitoring kit 1 kit by Does not apply route daily 3/4/20  Yes Layla Dunn MD   beclomethasone (QVAR) 80 MCG/ACT inhaler Inhale 2 puffs into the lungs 2 times daily   Yes Historical Provider, MD   OXYGEN Inhale 6 L into the lungs continuous    Yes Historical Provider, MD   Ipratropium-Albuterol (COMBIVENT IN) Inhale 2 puffs into the lungs 2 times daily    Yes Historical Provider, MD   gabapentin (NEURONTIN) 800 MG tablet Take 1 tablet by mouth nightly for 30 days.  6/4/21 7/4/21  Any Delgadillo DO   promethazine (PHENERGAN) 25 MG tablet Take 1 tablet by mouth every 6 hours as needed for Nausea 9/25/20   Any Delgadillo DO       Current Medications:    Current Facility-Administered Medications: sodium chloride (OCEAN, BABY AYR) 0.65 % nasal spray 1 spray, 1 spray, Each Nostril, Q4H PRN  fluticasone (FLONASE) 50 MCG/ACT nasal spray 1 spray, 1 spray, Each Nostril, BID  melatonin tablet 3 mg, 3 mg, Oral, Nightly PRN  furosemide (LASIX) injection 40 mg, 40 mg, Intravenous, BID  insulin glargine (LANTUS) injection vial 30 Units, 30 Units, Subcutaneous, BID  perflutren lipid microspheres (DEFINITY) injection 1.65 mg, 1.5 mL, Intravenous, ONCE PRN  amLODIPine (NORVASC) tablet 10 mg, 10 mg, Oral, Daily  aspirin EC tablet 81 mg, 81 mg, Oral, Daily  atenolol (TENORMIN) tablet 50 mg, 50 mg, Oral, Daily  atorvastatin (LIPITOR) tablet 80 mg, 80 mg, Oral, Nightly  budesonide (PULMICORT) nebulizer suspension 500 mcg, 0.5 mg, Nebulization, BID  mirtazapine (REMERON) tablet 30 mg, 30 mg, Oral, Nightly  pantoprazole (PROTONIX) tablet 40 mg, 40 mg, Oral, QAM AC  sodium chloride flush 0.9 % injection 5-40 mL, 5-40 mL, Intravenous, 2 times per day  sodium chloride flush 0.9 % injection 5-40 mL, 5-40 mL, Intravenous, PRN  0.9 % sodium chloride infusion, 25 mL, Intravenous, PRN  enoxaparin (LOVENOX) injection 40 mg, 40 mg, Subcutaneous, Daily  ondansetron (ZOFRAN-ODT) disintegrating tablet 4 mg, 4 mg, Oral, Q8H PRN **OR** ondansetron (ZOFRAN) injection 4 mg, 4 mg, Intravenous, Q6H PRN  polyethylene glycol (GLYCOLAX) packet 17 g, 17 g, Oral, Daily PRN  acetaminophen (TYLENOL) tablet 650 mg, 650 mg, Oral, Q6H PRN **OR** acetaminophen (TYLENOL) suppository 650 mg, 650 mg, Rectal, Q6H PRN  ipratropium-albuterol (DUONEB) nebulizer solution 1 ampule, 1 ampule, Inhalation, Q4H WA  hydrALAZINE (APRESOLINE) tablet 25 mg, 25 mg, Oral, Q8H PRN  glucose (GLUTOSE) 40 % oral gel 15 g, 15 g, Oral, PRN  dextrose 50 % IV solution, 12.5 g, Intravenous, PRN  glucagon (rDNA) injection 1 mg, 1 mg, Intramuscular, PRN  dextrose 5 % solution, 100 mL/hr, Intravenous, PRN  insulin lispro (HUMALOG) injection vial 11 Units, 11 Units, Subcutaneous, TID WC  glucose (GLUTOSE) 40 % oral gel 15 g, 15 g, Oral, PRN  dextrose 50 % IV solution, 12.5 g, Intravenous, PRN  glucagon (rDNA) injection 1 mg, 1 mg, Intramuscular, PRN  dextrose 5 % solution, 100 mL/hr, Intravenous, PRN    Allergies:  Amoxicillin and Penicillins    Social History:   Social History     Socioeconomic History    Marital status: Single     Spouse name: Not on file    Number of children: Not on file    Years of education: 15    Highest education level: Not on file   Occupational History    Occupation: disabled-   Tobacco Use    Smoking status: Former Smoker     Packs/day: 2.00     Years: 38.00     Pack years: 76.00     Types: Cigarettes     Start date:      Quit date: 2008     Years since quittin.0    Smokeless tobacco: Never Used   Vaping Use    Vaping Use: Never used   Substance and Sexual Activity    Alcohol use: No     Alcohol/week: 0.0 standard drinks     Comment: was heavy drinker; quit age 35;  drinks 1-2 cups of coffee daily    Drug use: Yes     Types: IV     Comment: Acid, marijuana, cocaine, STOPPED ALL DRUGS     Sexual activity: Not Currently   Other Topics Concern    Not on file   Social History Narrative    Not on file     Social Determinants of Health     Financial Resource Strain:     Difficulty of Paying Living Expenses:    Food Insecurity:     Worried About Running Out of Food in the Last Year:     [address] in the Last Year:    Transportation Needs:     Lack of Transportation (Medical):  Lack of Transportation (Non-Medical):    Physical Activity:     Days of Exercise per Week:     Minutes of Exercise per Session:    Stress:     Feeling of Stress :    Social Connections:     Frequency of Communication with Friends and Family:     Frequency of Social Gatherings with Friends and Family:     Attends Worship Services:     Active Member of Clubs or Organizations:     Attends Club or Organization Meetings:     Marital Status:    Intimate Partner Violence:     Fear of Current or Ex-Partner:     Emotionally Abused:     Physically Abused:     Sexually Abused:        Family History:   Family History   Problem Relation Age of Onset    Heart Failure Father     Diabetes Mother     Heart Surgery Brother          REVIEW OF SYSTEMS:     Constitutional: Denies fatigue, fevers, chills, night sweats, oscar loss, oscar gain  HEENT: Denies headaches, nose bleeds, and blurred vision,oral pain, oral lesion. Neurological: Denies history of stroke, weakness, dizziness and lightheadedness, numbness and tingling  Cardiovascular: Denies chest pain,  palpitations, and feelings of heart racing. Respiratory: Positive for shortness of breath use of home oxygen via nasal cannula. Denies cough, wheezing. Gastrointestinal: Denies heartburn, nausea, vomiting, diarrhea and constipation, black/bloody, and tarry stools. Genitourinary:  Denies pain on  urination,  blood in urine, trouble starting urination, need to strain on urination, urinary urgency, urinary incontinence. Hematologic:  Denies history of easy bruising, prolonged bleeding,  of blood clots in legs  Lymphatic: Denies lumps and bumps to neck, axilla, breast, and groin  Endocrine: Denies excessive thirst. Denies intolerance to heat or cold  Musculoskeletal: Denies falls, pain to BLE with ambulation and edema to BLE. Psychiatric: Denies anxiety and depression.     PHYSICAL EXAM:   /68   Pulse 80   Temp 97.3 °F (36.3 °C) (Temporal)   Resp 20   Ht 6' (1.829 m)   Wt (!) 379 lb 1 oz (171.9 kg)   SpO2 (!) 86%   BMI 43.51 kg/m²   Systolic (93WFO), RKV:996 , Min:113 , ADN:495    Diastolic (81YDJ), MBT:33, Min:61, Max:85    CONST: Morbid obese. Awake, alert, cooperative, no apparent distress  HEENT:   Head- Normocephalic, atraumatic   Eyes- Conjunctivae pink, anicteric  Throat- Oral mucosa pink and moist  Neck-  No stridor, trachea midline, no jugular venous distention. No adenopathy   CHEST: Chest symmetrical and non-tender to palpation. No accessory muscle use or intercostal retractions  RESPIRATORY:  Lung sounds -diffusely decreased breathing sounds  CARDIOVASCULAR:     No carotid bruits  Heart Inspection- shows no noted pulsations  Heart Palpation- no heaves or thrills; PMI is non-displaced   Heart Ausculation-heart sounds distant, Regularrate and rhythm, no murmur. No s3, s4 or rub   PV: No lower extremity pitting edema. No varicosities. Pedal pulses palpable, no clubbing or cyanosis   ABDOMEN: Soft, obese, non-tender to light palpation. Bowel sounds present. MS: Moves all extremities. Good muscle strength and tone. No atrophy or abnormal movements. : Deferred  SKIN: Warm and moist,  no stasis dermatitis or ulcers on legs  NEURO / PSYCH: Oriented to person, place and time. Speech clear and appropriate. Follows all commands. Pleasant affect     DATA:    ECG reviewed with Dr. Monty Mccollum strips: SR  Diagnostic:  Last stress test 1/4/2016  IMPRESSION:   1. No evidence of pharmacologic stress-induced myocardial  ischemia. 2. Findings consistent with infarction involving the mid and  distal segments of the inferior wall. 3. Inferior wall hypokinesis. 4. The left ventricular ejection fraction is 42%.       ECHO 08/05/2021 Start: 02:36 PM     Study Location: Portable  Technical Quality: Limited visualization due to body habitus.     Indications:Dyspnea/SOB.     Patient Status: Routine     Contrast Medium: Definity ventricle global systolic function is mildly reduced . TAPSE 15 mm. No hemodynamically significant aortic stenosis is present. Mildly dilated aortic root (3.7 cm). No evidence for hemodynamically significant pericardial effusion. Technically difficult examination. Definity echo contrast was used to   delineate endocardial borders. XR FOOT RIGHT (MIN 3 VIEWS)    Result Date: 8/2/2021  EXAMINATION: THREE XRAY VIEWS OF THE RIGHT FOOT 8/2/2021 3:02 pm COMPARISON: None. HISTORY: ORDERING SYSTEM PROVIDED HISTORY: foot wound TECHNOLOGIST PROVIDED HISTORY: Reason for exam:->foot wound What reading provider will be dictating this exam?->CRC FINDINGS: There is no evidence of acute fracture. There is normal alignment of the tarsometatarsal joints. No acute joint abnormality. No focal osseous lesion. Diffused edema the right foot. Nonspecific edema of the foot. No acute fractures or dislocations in the right foot. CT HEAD WO CONTRAST    Result Date: 8/2/2021  EXAMINATION: CT OF THE HEAD WITHOUT CONTRAST  8/2/2021 5:23 pm TECHNIQUE: CT of the head was performed without the administration of intravenous contrast. Dose modulation, iterative reconstruction, and/or weight based adjustment of the mA/kV was utilized to reduce the radiation dose to as low as reasonably achievable. COMPARISON: None. HISTORY: ORDERING SYSTEM PROVIDED HISTORY: LOC TECHNOLOGIST PROVIDED HISTORY: Has a \"code stroke\" or \"stroke alert\" been called? ->No Reason for exam:->LOC Decision Support Exception - unselect if not a suspected or confirmed emergency medical condition->Emergency Medical Condition (MA) What reading provider will be dictating this exam?->CRC FINDINGS: BRAIN/VENTRICLES: No mass effect, edema or hemorrhage is seen. Mild volume loss is seen in the cerebrum with mild chronic microvascular ischemic changes. No hydrocephalus or extra-axial fluid is seen.  ORBITS: The visualized portion of the orbits demonstrate no acute abnormality. SINUSES:  A mucous retention cyst is seen in the left maxillary sinus. Mild mucosal thickening in the ethmoid sinuses. The remainder of the visualized paranasal sinuses and the mastoids are grossly clear. SOFT TISSUES/SKULL:  No acute abnormality of the visualized skull or soft tissues. No skull fracture or acute intracranial abnormality. CTA PULMONARY W CONTRAST    Result Date: 8/2/2021  EXAMINATION: CTA OF THE CHEST 8/2/2021 5:23 pm TECHNIQUE: CTA of the chest was performed after the administration of intravenous contrast.  Multiplanar reformatted images are provided for review. MIP images are provided for review. Dose modulation, iterative reconstruction, and/or weight based adjustment of the mA/kV was utilized to reduce the radiation dose to as low as reasonably achievable. COMPARISON: Previous examination on the same day 3 of is prior. HISTORY: ORDERING SYSTEM PROVIDED HISTORY: hypoxic TECHNOLOGIST PROVIDED HISTORY: Reason for exam:->hypoxic Decision Support Exception - unselect if not a suspected or confirmed emergency medical condition->Emergency Medical Condition (MA) What reading provider will be dictating this exam?->CRC FINDINGS: The examination is technically limited due to suboptimal contrast opacification and patient body habitus. Given this limitation, there are no filling defects in the main pulmonary artery and the central branches. The distal subsegmental and peripheral vessels are poorly opacified and cannot be evaluated for distal subsegmental pulmonary embolism. There is cardiomegaly. Moderately enlarged mediastinal lymph nodes are identified with lymph nodes measuring up to 1.3 cm. Trachea and major bronchi are patent. There is atelectasis and moderate pleural effusion in the lower lobes with ground-glass opacities in the upper lobes. 2-4 mm nonspecific pulmonary nodules are identified in the right upper lobe and right lower lobe without change.   The liver is fatty tissue structures is severely limited due to patient's body habitus and low-dose technique. There is an enlarged precarinal lymph node measuring 12 mm in short axis. This is new from the prior examination. An AP window lymph node is also at least mildly enlarged measuring 12 mm in short axis. Heart size is within normal limits. Faint coronary artery calcifications are noted, potentially a marker for coronary artery disease. The thoracic esophagus is decompressed, limiting assessment. No obvious esophageal abnormality. Lungs/Pleura: The central airways are patent. No evidence of a pneumothorax. There are small bilateral pleural effusions with bibasilar atelectasis. A 2 mm subpleural right upper lobe pulmonary nodule is noted on axial image 55. A 3-4 mm right middle lobe nodule on axial image 69 is stable. Upper Abdomen:  Evaluation of upper abdominal structures is essentially nondiagnostic due to severe photon starvation artifact. Soft Tissues/Bones: Evaluation of osseous structures is limited. No convincing evidence of acute osseous abnormality. Significantly limited examination as described. There are small bilateral pleural effusions with bibasilar airspace opacities, most in keeping with compressive atelectasis. Several 2-4 mm right-sided pulmonary nodules are felt to be stable. New mediastinal lymphadenopathy, of uncertain etiology or clinical significance. Given these findings, correlation with diagnostic CT of the chest is recommended chest. LUNG RADS: Per ACR Lung-RADS Version 1.1 Category 2S, Benign appearance or behavior. Management:  Continue annual lung screening with LDCT in 12 months. RECOMMENDATIONS: If you would like to register your patient with the [facility], please contact the Nurse Navigator at [phone]. Labs:   CARDIAC ENZYMES:  No results for input(s): CKTOTAL, CKMB, CKMBINDEX, TROPHS in the last 72 hours.   H/O TROPONIN levels Lab Results   Component Value Date    TROPHS 25 08/02/2021    TROPHS 24 08/02/2021    TROPONINI <0.01 01/04/2016    TROPONINI <0.01 01/04/2016    TROPONINI <0.01 01/03/2016    TROPONINI <0.01 02/22/2014    TROPONINI <0.01 09/20/2011     No results for input(s): PROBNP in the last 72 hours. Lab Results   Component Value Date    PROBNP 4,152 08/02/2021    PROBNP 339 01/03/2016     BMP:   Recent Labs     08/06/21  0641 08/07/21  0732    143   K 4.0 3.8   CO2 29 31*   BUN 21 24*   CREATININE 1.7* 1.7*   LABGLOM 41 41   CALCIUM 9.2 9.3     Lab Results   Component Value Date    CREATININE 1.7 08/07/2021    CREATININE 1.7 08/06/2021    CREATININE 1.8 08/05/2021    CREATININE 1.8 08/04/2021    CREATININE 1.7 08/03/2021    CREATININE 2.1 08/02/2021    CREATININE 1.4 01/08/2021    CREATININE 1.3 07/20/2020    CREATININE 1.5 05/15/2020    CREATININE 1.4 01/09/2020    CREATININE 1.3 07/22/2019    CREATININE 1.7 07/19/2019    CREATININE 1.4 11/19/2018    CREATININE 1.3 04/02/2018    CREATININE 1.2 02/26/2018    CREATININE 1.4 02/13/2018    CREATININE 1.3 01/29/2018    CREATININE 1.5 04/21/2017    CREATININE 1.6 01/18/2017    CREATININE 1.4 07/11/2016     CBC:   Recent Labs     08/07/21  0732 08/08/21  1024   WBC 11.4 10.7   HGB 14.4 14.4   HCT 47.7 46.0    250     Mag: No results for input(s): MG in the last 72 hours. Phos: No results for input(s): PHOS in the last 72 hours. TSH: No results for input(s): TSH in the last 72 hours. HgA1c:   Lab Results   Component Value Date    LABA1C 6.3 (H) 08/03/2021     No results found for: EAG  BNP: No results for input(s): BNP in the last 72 hours. PT/INR: No results for input(s): PROTIME, INR in the last 72 hours. APTT:No results for input(s): APTT in the last 72 hours.     FASTING LIPID PANEL:  Lab Results   Component Value Date    CHOL 161 01/09/2020    HDL 39 01/09/2020    LDLCALC 49 01/09/2020    TRIG 367 01/09/2020     LIVER PROFILE:No results for input(s): AST, ALT, LABALBU in the last 72 hours. COVID-19 Labs:  Lab Results   Component Value Date    COVID19 Not Detected 08/02/2021     No results for input(s): COVID19 in the last 72 hours. ASSESSMENT:  Elevated troponin, flat, not a pattern of acute coronary syndrome, probably type II ischemia in context of acute respiratory failure and ARLINE    Exacerbation of chronic shortness of breath due to multiple causes  Acute on chronic respiratory failure multietiologic    CAD, s/p PCI to LAD in 2009, ischemic cardiomyopathy EF 45-50% (ECHO 2/5/0094),    Diastolic dysfunction, grade 2  Bifascicular block with first-degree AV block.,   HTN,   HLD,   COPD on home O2,   ALEXIS on CPAP,   DM type II,   ARLINE/CKD,   GERD,  h/o  hepatitis C antibody positive,   h/o cocaine abuse,   depression,   BPH,   morbid obese BMI 51.41 kg/m²        PLAN:    Continue Telemetry  Continue IV diuresis per nephrology  Electrolyte and fluid hemostasis per nephrology  I&O, weights  BP control   Ischemic evaluation when acute component of respiratory failure resolves. Further cardiac recommendations will be forthcoming pending patient clinical course and diagnostic test findings. Assessment and plan discussed with Dr. Sanjeev Chawla MD    Assessment and Plan to follow as per Dr. Sanjeev Chawla MD    Electronically signed by JUANJO Santos on 8/8/2021 at 11:44 AM   [facility] Cardiology Consult       [place]    I have personally participated in a face-to-face and personally obtained history and performed physical exam on the date of service. I reviewed chart, vitals, labs and radiologic studies. I also participated in medical decision making with JUANJO Santos on the date of service All of the assessments and recommendations are from me and I agree with all of the pertinent clinical information, assessment and treatment plan. I have reviewed and edited the note above based on my findings during my history, exam, and decision making. Please see my additional contributions to the history, physical exam, assessment, and recommendations below. HISTORY OF PRESENT ILLNESS:     Reviewed, as above      Past medical history:  Reviewed, as above. Past surgical history:  Reviewed, as above. Current medications:  Reviewed, as above    Allergies:  Reviewed, as above    Social history:  Reviewed, as above    Family medical history:  Reviewed, as above. REVIEW OF SYSTEMS:   Reviewed, as above. PHYSICAL EXAM:   CONSTITUTIONAL:  awake, alert, cooperative, no apparent distress, and appears stated age  EYES:  lids and lashes normal and pupils equal, round and reactive to light, anicteric sclerae  HEAD:  normocepalic, without obvious abnormality, atraumatic, pink, moist mucous membranes. NECK:  Supple, symmetrical, trachea midline, no adenopathy, thyroid symmetric, not enlarged and no tenderness, skin normal  HEMATOLOGIC/LYMPHATICS:  no cervical lymphadenopathy and no supraclavicular lymphadenopathy  LUNGS:  No increased work of breathing, good air exchange, clear to auscultation bilaterally, no crackles or wheezing  CARDIOVASCULAR:  Normal apical impulse, regular rate and rhythm, normal S1 and S2, no S3 or S4, and no murmur noted and no JVD, no carotid bruit, no pedal edema, good carotid upstroke bilaterally. ABDOMEN: Morbidly obese, soft, nontender, BS+  CHEST: nontender to palpation, expands symmetrically  MUSCULOSKELETAL:  No clubbing no cyanosis. there is no redness, warmth, or swelling of the joints  full range of motion noted  NEUROLOGIC:  Alert, awake,oriented x3. SKIN:  no bruising or bleeding, normal skin color, texture, turgor and no redness, warmth, or swelling    BP (!) 140/70   Pulse 74   Temp 97.8 °F (36.6 °C) (Temporal)   Resp 22   Ht 6' (1.829 m)   Wt (!) 379 lb 1 oz (171.9 kg)   SpO2 (!) 85%   BMI 51.41 kg/m²       I/O last 3 completed shifts:   In: 270 [P.O.:270]  Out: 400 [Urine:400]  No intake/output data FASTING LIPID PANEL:    Lab Results   Component Value Date    CHOL 161 01/09/2020    HDL 39 01/09/2020    TRIG 367 01/09/2020     XR CHEST (2 VW)   Final Result   Development of a discrete vague ill-defined patchy ground-glass density or   infiltrates in the mid lower aspect of both lungs with some signs of a   perihilar vascular congestion. Cannot exclude underline bilateral pneumonia. Please correlate clinically. CT HEAD WO CONTRAST   Final Result   No skull fracture or acute intracranial abnormality. CTA PULMONARY W CONTRAST   Final Result   No central pulmonary embolism or aortic dissection. The distal subsegmental   and peripheral vessels are poorly evaluated due to poor contrast.      Cardiomegaly with moderate pleural effusion with atelectasis likely CHF and   or pneumonia. 2-4 mm nonspecific pulmonary nodules. Consider surveillance according to   Fleischner society guidelines. XR FOOT RIGHT (MIN 3 VIEWS)   Final Result   Nonspecific edema of the foot. No acute fractures or dislocations in the right foot. XR CHEST (2 VW)    (Results Pending)   NM Cardiac Stress Test Nuclear Imaging    (Results Pending)       I have personally reviewed the laboratory, cardiac diagnostic and radiographic testing as outlined above:    IMPRESSION:  1. Abnormal echocardiogram with wall motion abnormality: For stress test for further evaluation  2. Acute hypoxic respiratory failure: Multifactorial  3. Acute exacerbation of diastolic congestive heart failure: Improving, will continue current treatment  4. Abnormal EKG with right bundle branch block  5. Type 2 diabetes mellitus  6.  morbid obesity  6. Stage III chronic kidney disease    RECOMMENDATIONS:   1. Lexiscan stress test  2. Continue current treatment  3. Intake and output  4.  Basic metabolic panel in a.m.  5. Further cardiac recommendations will be forthcoming pending his clinical course and diagnostic test findings    I have reviewed my findings and recommendations with patient    Thank you for the consult  Electronically signed by Bascom Cushing, MD on 8/8/2021 at 8:24 PM  NOTE: This report was transcribed using voice recognition software.  Every effort was made to ensure accuracy; however, inadvertent computerized transcription errors may be present

## 2021-08-08 NOTE — PROGRESS NOTES
[facility] Attending    S: 61 y.o. male with a history of chronic respiratory failure on 4-6 L of oxygen at home, COPD with bipap at night, htn, CKD 3b, Dm2 (a1c 6.3), CAD s/p stent, MDD, anxiety, gerd, bph, HFpEF, tobacco/remote cocaine use, obesity, and + hcv ab who presented after shopping in [facility] without the use of his oxygen. He walked to the car and he passed out. He had no cp, palpitations or seizure-like activity. He was short of breath before and walking throughout the store. He was told his lips were blue. Also with increasing dyspnea over the past few months. His oxygen saturation was80% on 4L in the ER. He also was found to have an ARLINE on CKD. He was placed on a NRB mask in the ER. This morning, he states his breathing continues to improve at rest but gets short of breath with minimal exertion. Denies any pain. No significant swelling. O: VS- Blood pressure 116/68, pulse 80, temperature 97.3 °F (36.3 °C), temperature source Temporal, resp. rate 20, height 6' (1.829 m), weight (!) 379 lb 1 oz (171.9 kg), SpO2 (!) 86 %. Exam is as noted by resident with the following changes, additions or corrections:  Gen: NAD on 8 L NC  HEENT: NCAT, PERRL  CV-RRR no M/R/G   Lungs-clear  ABD-soft nonttp no masses   Ext-no C/C; tr pitting edema from his feet to his legs, improved  Laceration between his second and third right toe    Impressions:   Principal Problem:    Shortness of breath  Active Problems: Morbid obesity    Emphysema/COPD    Hypertension    Diabetes mellitus type 2 in obese (HCC)    CKD (chronic kidney disease) stage 3, GFR 30-59 ml/min ([facility])    Depression    CAD (coronary artery disease)    Pneumonia    Foot laceration    Hypoxia  Resolved Problems:    * No resolved hospital problems. *      Plan:  Pt requiring increased oxygen with acute on chronic respiratory failure. CT with pleural effusions, doubt infection. IV Lasix, echo noted. Diastolic function was unable to be evaluated, but nl EF. Elevated BNP. Follow I and O for hx of HFpEF and suspected worsening along with the effusions/copd. Also on nebs. cardiology consulted . Adjust insulin for bg. Metformin on hold. Evaristo on ckd. Appreciate renal consult. Stable despite lasix. Will need tobacco cessation. PT/OT  Podiatry following pt and placed 4-5 sutures rt interdigital space  Follow up outpatient with Dr. Yoon Peck for depression  Discuss compliance with use of oxygen on discharge at home  Strict I/os     Attending Physician Statement  I have reviewed the chart and seen the patient with the resident(s). I personally reviewed images, EKG's and similar tests, if present. I personally reviewed and performed key elements of the history and exam.  I have reviewed and confirmed student and/or resident history and exam with changes as indicated above. I agree with the assessment, plan and orders as documented by the resident. Please refer to the resident and/or student note for additional information. Heather Davis.  Carrie Jorge MD

## 2021-08-08 NOTE — PROGRESS NOTES
[facility] Inpatient   Resident Progress Note    S:  Hospital day: 5   Brief Synopsis: Iveth Maxwell is a 61 y.o. male who presented with shortness of breath and foot laceration. Patient required 15L nonrebreathing mask at the time of admission. Pulmonology was consulted and patient was placed on Pulmicort, Duonebs and IV lasix. Patient overall SOB improved and is on 9L NC. Overnight/interim:    No acute events overnight. Patient reports that his shortness of breath is still present, reports slight improvement but experiences worsening SOB will walking to the restroom. He denies any chest pain, fever, chills or abdominal pain at this time. Cont meds:    sodium chloride      dextrose      dextrose       Scheduled meds:    fluticasone  1 spray Each Nostril BID    furosemide  40 mg Intravenous BID    insulin glargine  30 Units Subcutaneous BID    amLODIPine  10 mg Oral Daily    aspirin  81 mg Oral Daily    atenolol  50 mg Oral Daily    atorvastatin  80 mg Oral Nightly    budesonide  0.5 mg Nebulization BID    mirtazapine  30 mg Oral Nightly    pantoprazole  40 mg Oral QAM AC    sodium chloride flush  5-40 mL Intravenous 2 times per day    enoxaparin  40 mg Subcutaneous Daily    ipratropium-albuterol  1 ampule Inhalation Q4H WA    insulin lispro  11 Units Subcutaneous TID WC     PRN meds: sodium chloride, melatonin, perflutren lipid microspheres, sodium chloride flush, sodium chloride, ondansetron **OR** ondansetron, polyethylene glycol, acetaminophen **OR** acetaminophen, hydrALAZINE, glucose, dextrose, glucagon (rDNA), dextrose, glucose, dextrose, glucagon (rDNA), dextrose     I reviewed the patient's past medical and surgical history, Medications and Allergies. O:  /69   Pulse 65   Temp 96.8 °F (36 °C) (Temporal)   Resp 18   Ht 6' (1.829 m)   Wt (!) 379 lb 1 oz (171.9 kg)   SpO2 99%   BMI 51.41 kg/m²   24 hour I&O: I/O last 3 completed shifts:   In: 360 [P.O.:360]  Out: 325 [Urine:325]  I/O this shift:  In: -   Out: 400 [Urine:400]     Physical Exam  General Appearance: alert and oriented to person, place and time, well developed and well- nourished, in no acute distress  Skin: warm and dry, no rash or erythema  Head: normocephalic and atraumatic  Eyes: pupils equal, round, and reactive to light, extraocular eye movements intact, conjunctivae normal  Pulmonary/Chest:CTABL. No rhonchi, normal air movement, no respiratory distress  Cardiovascular: normal rate, regular rhythm, normal S1 and S2, no murmurs, rubs, clicks, or gallops  Abdomen: soft, non-tender, obese, normal bowel sounds,  Extremities: no cyanosis, clubbing. Minimal edema  Musculoskeletal: normal range of motion, no joint swelling, deformity or tenderness  Neurologic: reflexes normal and symmetric, no cranial nerve deficit, gait, coordination and speech normal        Labs:  Na/K/Cl/CO2:  143/3.8/102/31 (08/07 0732)  BUN/Cr/glu/ALT/AST/amyl/lip:  24/1.7/--/--/--/--/-- (08/07 0732)  WBC/Hgb/Hct/Plts:  11.4/14.4/47.7/256 (08/07 0732)  estimated creatinine clearance is 75 mL/min (A) (based on SCr of 1.7 mg/dL (H)). Other pertinent labs as noted below    Radiology:  XR CHEST (2 VW)   Final Result   Development of a discrete vague ill-defined patchy ground-glass density or   infiltrates in the mid lower aspect of both lungs with some signs of a   perihilar vascular congestion. Cannot exclude underline bilateral pneumonia. Please correlate clinically. CT HEAD WO CONTRAST   Final Result   No skull fracture or acute intracranial abnormality. CTA PULMONARY W CONTRAST   Final Result   No central pulmonary embolism or aortic dissection. The distal subsegmental   and peripheral vessels are poorly evaluated due to poor contrast.      Cardiomegaly with moderate pleural effusion with atelectasis likely CHF and   or pneumonia. 2-4 mm nonspecific pulmonary nodules.   Consider surveillance according to   [address] guidelines. XR FOOT RIGHT (MIN 3 VIEWS)   Final Result   Nonspecific edema of the foot. No acute fractures or dislocations in the right foot. XR CHEST (2 VW)    (Results Pending)         Resident Assessment and Plan       1. Worsening shortness of breath 2/2 CHF vs COPD exacerbation  CTA pulmonary w contrast and CT lung screen on 08/02: as listed above  Hx of HFpEF, followed by Dr. Ari Vasquez. Echo in 2016 (EF 65%); Most recent 08/05/21 EF 45-50%, dilated RV and dilated aortic root  Patient on 6 L NC at home, 4 L O2 in the car and uses BiPAP at night  Patient is currently on 9 L O2 NC  Unlikely pneumonia as patient denies cough or sputum production, fever, normal WBCs, and procal not significantly elevated  Suspect CHF due to elevated ProBNP  · Continue 40 mg IV BID Lasix   · Strict Is and Os  · Pulmonology following - continue oxygen therapy and BiPAP, Brovana BID, Pulmicort BID and Duonebs. Will encourage incentive spirometer  · Wean O2 as able. · Will need to follow up outpatient for nodules that were noted.                 2. ARLINE on CKD stage IIIb  Cr 2.1 mg/dL on admission, labs pending today  · FeNa 1.2% and FeUrea 48.1% suspecting an intrinsic renal disease  · Nephro consulted- avoid NSAIDs, strict I and Os. · Continue to monitor Cr, BUN and GFR  · Hold HCTZ, Losartan, and Metformin                 3.  HTN - stable  Normotensive at this time. Atenolol may contribute to airway constriction. Patient would likely benefit from switching to metoprolol or carvedilol prior to discharge. · Continue atenolol and amlodipine   · Continue to hold HCTZ and Losartan due to ARLINE. · Consider adding hydralazine if high BB                4. Foot laceration  Large laceration located under the Right 4th toe  · Wound care consulted   · Wound was cleaned and sutured on 08/04  · Podiatry - recommend QoD dressing changes with Betadine, xeroform and DSD.                  5. Type II diabetes   Home regimen: Novolog 20U daily, Novolin 70/30 55U BID  HbA1c (08/03): 6.3  Goal 140-180. Currently within target range. · Continue Lantus 30 U BID and Humalog 11 U with meals (pharmacy replacement for patient's home regimen)  · Hold metformin due to ARLINE     6. HLD  · Continue Lipitor     7. GERD  · Continue Protonix      8. Depression  Patient's major motivating factor is his dog  · Continue Trazodone and Remeron  · Hold Gabapentin  · Psychologist, Dr. Magdi Luciano, following     9. Hx of Hep C infection  · Awaiting for hep C viral load     10. Hx of cocaine abuse  · Urine Drug test: positive for fentanyl  · Patient denies any recent drug abuse. Endorses a past history of IV cocaine abuse and abuse of various other drugs.  Denies ever using heroin.                   PT/OT evaluation: Performed  DVT prophylaxis:levonox  GI prophylaxis: protonix  Disposition: home once oxygen better controlled  Diet: normal diet       Electronically signed by Laura Gil MD on 8/8/2021 at 7:00 AM  Attending physician: Dr. Joceline Mobley

## 2021-08-08 NOTE — PLAN OF CARE
Problem: Falls - Risk of:  Goal: Will remain free from falls  Description: Will remain free from falls  8/7/2021 2354 by William Ontiveros RN  Outcome: Met This Shift  8/7/2021 1259 by Oneil Gold RN  Outcome: Met This Shift  Goal: Absence of physical injury  Description: Absence of physical injury  8/7/2021 2354 by William Ontiveros RN  Outcome: Met This Shift  8/7/2021 1259 by Oneil Gold RN  Outcome: Met This Shift     Problem: Skin Integrity:  Goal: Will show no infection signs and symptoms  Description: Will show no infection signs and symptoms  Outcome: Met This Shift  Goal: Absence of new skin breakdown  Description: Absence of new skin breakdown  8/7/2021 2354 by William Ontiveros RN  Outcome: Met This Shift  8/7/2021 1259 by Oneil Gold RN  Outcome: Met This Shift     Problem: Pain:  Goal: Pain level will decrease  Description: Pain level will decrease  8/7/2021 2354 by Willima Ontiveros RN  Outcome: Met This Shift  8/7/2021 1259 by Oneil Gold RN  Outcome: Met This Shift  Goal: Control of acute pain  Description: Control of acute pain  8/7/2021 2354 by William Ontiveros RN  Outcome: Met This Shift  8/7/2021 1259 by Oneil Gold RN  Outcome: Met This Shift  Goal: Control of chronic pain  Description: Control of chronic pain  8/7/2021 2354 by William Ontiveros RN  Outcome: Met This Shift  8/7/2021 1259 by Oneil Gold RN  Outcome: Met This Shift

## 2021-08-09 NOTE — PROGRESS NOTES
Pt injected for resting images prior to stress test. Unable to obtain diagnostic images. Pt injected with booster mibi and waited approx 20 more minutes to image again, ending with same results. Non-diagnostic images.  Dr. Duglas Lane was shown pics and cx the stress test

## 2021-08-09 NOTE — PROGRESS NOTES
[facility] Attending    S: 61 y.o. male with a history of chronic respiratory failure on 4-6 L of oxygen at home, COPD with bipap at night, htn, CKD 3b, Dm2 (a1c 6.3), CAD s/p stent, MDD, anxiety, gerd, bph, HFpEF, tobacco/remote cocaine use, obesity, and + hcv ab who presented after shopping in [facility] without the use of his oxygen. He walked to the car and he passed out. He had no cp, palpitations or seizure-like activity. He was short of breath before and walking throughout the store. He was told his lips were blue. Also with increasing dyspnea over the past few months. His oxygen saturation was80% on 4L in the ER. He also was found to have an ARLINE on CKD. He was placed on a NRB mask in the ER. Today, no new symptoms or concerns reported, stable overall. No pain at this time. O: VS- Blood pressure (!) 140/71, pulse 68, temperature 96.4 °F (35.8 °C), temperature source Temporal, resp. rate 20, height 6' (1.829 m), weight (!) 379 lb 1 oz (171.9 kg), SpO2 95 %. Exam is as noted by resident with the following changes, additions or corrections:  Gen: NAD on O2 by NC   HEENT: NCAT, PERRL  CV-RRR no M/R/G   Lungs-clear  ABD-soft nonttp no masses   Ext-no C/C; trace-1+ edema bilaterally     Impressions:   Principal Problem:    Shortness of breath  Active Problems: Morbid obesity    Emphysema/COPD    Hypertension    Diabetes mellitus type 2 in obese ([facility])    CKD (chronic kidney disease) stage 3, GFR 30-59 ml/min ([facility])    Depression    CAD (coronary artery disease)    Pneumonia    Foot laceration    Hypoxia    Syncope and collapse    Abnormal echocardiogram    EKG, abnormal  Resolved Problems:    * No resolved hospital problems. *      Plan:  Pt requiring increased oxygen with acute on chronic respiratory failure. CT with pleural effusions, doubt infection. IV Lasix, echo noted. Diastolic function was unable to be evaluated, but nl EF. Elevated BNP.  Follow I and

## 2021-08-09 NOTE — PROGRESS NOTES
Sutures noted to laceration. Sutures intact and in intended position. No noted dehiscence. Coapting as expected, skin edges well adhered. Not macerated. No noted pus or drainage. No SOI  Ecchymosis noted to digits 2-4 on RLE is resolving. Edema noted to RLE, resolving as well      MUSCULOSKELETAL: MMT Deferred. Equinus b/l       Scheduled Meds:   fluticasone  1 spray Each Nostril BID    furosemide  40 mg Intravenous BID    insulin glargine  30 Units Subcutaneous BID    amLODIPine  10 mg Oral Daily    aspirin  81 mg Oral Daily    atenolol  50 mg Oral Daily    atorvastatin  80 mg Oral Nightly    budesonide  0.5 mg Nebulization BID    mirtazapine  30 mg Oral Nightly    pantoprazole  40 mg Oral QAM AC    sodium chloride flush  5-40 mL Intravenous 2 times per day    enoxaparin  40 mg Subcutaneous Daily    ipratropium-albuterol  1 ampule Inhalation Q4H WA    insulin lispro  11 Units Subcutaneous TID WC     Continuous Infusions:   sodium chloride      dextrose      dextrose       PRN Meds:.technetium sestamibi, regadenoson, sodium chloride, melatonin, perflutren lipid microspheres, sodium chloride flush, sodium chloride, ondansetron **OR** ondansetron, polyethylene glycol, acetaminophen **OR** acetaminophen, hydrALAZINE, glucose, dextrose, glucagon (rDNA), dextrose, glucose, dextrose, glucagon (rDNA), dextrose    RADIOLOGY:  XR CHEST (2 VW)   Final Result   Development of a discrete vague ill-defined patchy ground-glass density or   infiltrates in the mid lower aspect of both lungs with some signs of a   perihilar vascular congestion. Cannot exclude underline bilateral pneumonia. Please correlate clinically. CT HEAD WO CONTRAST   Final Result   No skull fracture or acute intracranial abnormality. CTA PULMONARY W CONTRAST   Final Result   No central pulmonary embolism or aortic dissection.   The distal subsegmental   and peripheral vessels are poorly evaluated due to poor contrast. Cardiomegaly with moderate pleural effusion with atelectasis likely CHF and   or pneumonia. 2-4 mm nonspecific pulmonary nodules. Consider surveillance according to   Fleischner society guidelines. XR FOOT RIGHT (MIN 3 VIEWS)   Final Result   Nonspecific edema of the foot. No acute fractures or dislocations in the right foot. XR CHEST (2 VW)    (Results Pending)     BP (!) 140/71   Pulse 68   Temp 96.4 °F (35.8 °C) (Temporal)   Resp 20   Ht 6' (1.829 m)   Wt (!) 379 lb 1 oz (171.9 kg)   SpO2 95%   BMI 51.41 kg/m²     LABS:    Recent Labs     08/08/21  1024 08/09/21  0726   WBC 10.7 10.2   HGB 14.4 14.2   HCT 46.0 47.5    274        Recent Labs     08/09/21  0726      K 4.2      CO2 31*   BUN 30*   CREATININE 1.8*        No results for input(s): PROT, INR, APTT in the last 72 hours. ASSESSMENT:    1. RLE laceration -POA  2. RLE Trauma  3. RLE edema, pain  4. Falls  5. DM with neuropathic changes     Shortness of breath    Morbid obesity    Emphysema/COPD    Hypertension    Diabetes mellitus type 2 in obese ([facility])    CKD (chronic kidney disease) stage 3, GFR 30-59 ml/min ([facility])    Depression    CAD (coronary artery disease)    Pneumonia    Foot laceration    Hypoxia              PLAN:     - Patient was examined and evaluated. Reviewed patient's recent lab results and pertinent diagnostic imaging. Reviewed ancillary service notes. - Patient had laceration sutured previously at bedside. Dressings this afternnon were changed: Betadine, Xeroform, DSD. Will continue QoD (MWF)dressing changes with betadine, xeroform, DSD.   - Antibiotics as per ID/IM   - X-rays: 1.Edema              2.No acute fractures or dislocations  - Prevalon boots ordered. No noted today  - Surgical shoe ordered  - 50% WB to RLE heel as tolerated in surgical shoe  - Discussed patient with Dr. Nichelle Richardson   - Will continue to follow patient while they are in-house. Patient should follow up in office within one week of discharge. Stable for discharge from Podiatric stand point once cleared from all other teams on board.      Jamil Appl   [phone]

## 2021-08-09 NOTE — PROGRESS NOTES
Date: 8/8/2021    Time: 9:52 PM    Patient Placed On BIPAP/CPAP/ Non-Invasive Ventilation? No and pt already on BIPAP    If no must comment. Facial area red/color change? No           If YES are Blister/Lesion present? No   If yes must notify nursing staff  BIPAP/CPAP skin barrier?   Yes      Skin barrier type:mepilexlite       Comments:        Katie Levy RCP

## 2021-08-09 NOTE — PROGRESS NOTES
%  SpO2/FiO2 ratio: 123.75  I Time/ I Time %: 0.9 s  Mask Type: Full face mask  Mask Size: Medium       IPAP: 15 cmH20  CPAP/EPAP: 10 cmH2O     CURRENT MEDS :  Scheduled Meds:   fluticasone  1 spray Each Nostril BID    furosemide  40 mg Intravenous BID    insulin glargine  30 Units Subcutaneous BID    amLODIPine  10 mg Oral Daily    aspirin  81 mg Oral Daily    atenolol  50 mg Oral Daily    atorvastatin  80 mg Oral Nightly    budesonide  0.5 mg Nebulization BID    mirtazapine  30 mg Oral Nightly    pantoprazole  40 mg Oral QAM AC    sodium chloride flush  5-40 mL Intravenous 2 times per day    enoxaparin  40 mg Subcutaneous Daily    ipratropium-albuterol  1 ampule Inhalation Q4H WA    insulin lispro  11 Units Subcutaneous TID WC       Physical Exam:  General Appearance: appears comfortable in no acute distress. Obese  HEENT: Normocephalic atraumatic without obvious abnormality   Neck: Lips, mucosa, and tongue normal.  Supple, symmetrical, trachea midline, no adenopathy;thyroid:  no enlargement/tenderness/nodules or JVD. Lung: Breath sounds, CTA. Respirations unlabored. Symmetrical expansion. Heart: RRR, normal S1, S2. No MRG  Abdomen: Soft, NT, ND. BS present x 4 quadrants. No bruit or organomegaly. Rotund  Extremities: Pedal pulses 2+ symmetric b/l. Extremities normal, no cyanosis, clubbing. Laceration under right 2nd toe  Musculokeletal: No joint swelling, no muscle tenderness. ROM normal in all joints of extremities. Neurologic: Mental status: Alert and Oriented X3 . Pertinent/ New Labs and Imaging Studies     Imaging Personally Reviewed:  CTA pulmonary W contrast 8/2  The examination is technically limited due to suboptimal contrast   opacification and patient body habitus. Given this limitation, there are no   filling defects in the main pulmonary artery and the central branches.   The   distal subsegmental and peripheral vessels are poorly opacified and cannot be   evaluated for distal subsegmental Component Value Date    WBC 10.2 08/09/2021    HGB 14.2 08/09/2021    HCT 47.5 08/09/2021    MCV 89.3 08/09/2021    MCH 26.7 08/09/2021    MCHC 29.9 08/09/2021    RDW 14.9 08/09/2021     08/09/2021    MPV 11.5 08/09/2021     Lab Results   Component Value Date     08/09/2021    K 4.2 08/09/2021     08/09/2021    CO2 31 08/09/2021    BUN 30 08/09/2021    CREATININE 1.8 08/09/2021    LABALBU 3.6 08/02/2021    LABALBU 4.2 09/20/2011    CALCIUM 9.3 08/09/2021    GFRAA 47 08/09/2021    LABGLOM 39 08/09/2021     Lab Results   Component Value Date    PROTIME 13.0 02/22/2014    INR 1.2 02/22/2014     No results for input(s): PROBNP in the last 72 hours. Recent Labs     08/07/21  0732   PROCAL 0.09*     This SmartLink has not been configured with any valid records. Micro:  Respiratory panel negative 8/2     Assessment:    Acute on chronic respiratory failure with hypoxia and hypercapnia  Syncopal episode  Obesity hypoventilation syndrome   Restrictive ventilatory pattern, last PFTs 2014  Pulmonary hypertension WHO group 2  Small bilateral pleural effusions on CTA  Heart failure with preserved EF  ALEXIS treated with BiPAP, Chronic home O2 3-6L NC  COPD/emphysema  Morbid obesity BMI 48  Nicotine dependence in remission  History of polysubstance abuse. Sober since 1993  Pulmonary nodule 2-4mm stable      Plan:   Oxygen therapy, 7L HFNC, titrate to keep SpO2 >87%  Bipap HS and PRN at home settings of 15/10, FiO2 80%. Titrate FiO2 to keep SpO2 >87%  Recruitment techniques, incentive spirometer  Continue Pulmicort BID and Duonebs Q4H WA  Stress test unable to be performed d/t poor image quality  Lasix 40 mg IV BID per nephrology. Dietary evaluation reviewed.  BMI 48  GI/DVT prophylaxis  Will need ambulatory pulse ox testing done prior to discharge  Per Fleischner guidelines, no more follow-ups required for pulmonary nodule  Will need PFTs as outpatient       Electronically signed by Dallas Marsh, JENELLE - CNP on 8/9/2021 at 11:59 AM     I personally saw, examined, and cared for the patient. Labs, medications, radiographs reviewed. I agree with history exam and plans detailed in NP note.         Aury Londono, DO

## 2021-08-09 NOTE — CARE COORDINATION
Reviewed chart, pt will need a bariatic rollator, called [facility] to check on order, they do not have bariatric in stock, will order for delivery tomorrow. Shankar Valadez, MSW, LSW

## 2021-08-09 NOTE — PLAN OF CARE
Problem: Falls - Risk of:  Goal: Will remain free from falls  Description: Will remain free from falls  8/9/2021 0855 by Misa Ruelas RN  Outcome: Ongoing  8/8/2021 2319 by Nikhil March RN  Outcome: Met This Shift  Goal: Absence of physical injury  Description: Absence of physical injury  8/9/2021 0855 by Misa Ruelas RN  Outcome: Ongoing  8/8/2021 2319 by Nikhil March RN  Outcome: Met This Shift     Problem: Skin Integrity:  Goal: Will show no infection signs and symptoms  Description: Will show no infection signs and symptoms  8/9/2021 0855 by Misa Ruelas RN  Outcome: Ongoing  8/8/2021 2319 by Nikhil March RN  Outcome: Met This Shift  Goal: Absence of new skin breakdown  Description: Absence of new skin breakdown  8/9/2021 0855 by Misa Ruelas RN  Outcome: Ongoing  8/8/2021 2319 by Nikhil March RN  Outcome: Met This Shift     Problem: Pain:  Goal: Pain level will decrease  Description: Pain level will decrease  8/9/2021 0855 by Misa Ruelas RN  Outcome: Ongoing  8/8/2021 2319 by Nikhil March RN  Outcome: Met This Shift  Goal: Control of acute pain  Description: Control of acute pain  8/9/2021 0855 by Misa Ruelas RN  Outcome: Ongoing  8/8/2021 2319 by Nikhil March RN  Outcome: Met This Shift  Goal: Control of chronic pain  Description: Control of chronic pain  8/9/2021 0855 by Misa Ruelas RN  Outcome: Ongoing  8/8/2021 2319 by Nikhil March RN  Outcome: Met This Shift

## 2021-08-09 NOTE — PROGRESS NOTES
Date: 8/9/2021    Time: 12:28 AM    Patient Placed On BIPAP/CPAP/ Non-Invasive Ventilation? No    If no must comment. Remains on  Facial area red/color change? No           If YES are Blister/Lesion present? No   If yes must notify nursing staff  BIPAP/CPAP skin barrier?   Yes    Skin barrier type:mepilexlite       Comments:        Cali Vargas RCP

## 2021-08-09 NOTE — PROGRESS NOTES
Occupational Therapy  OT consult received. Chart reviewed. Will hold evaluation secondary to awaiting post-op shoe per podiatry recommendation. Discussed with nursing. Will re-attempt OT evaluation as schedule permits when patient is appropriate.  Valentin Mckeon OTS; Thao Barragan, OTR/L #[id number]

## 2021-08-09 NOTE — PLAN OF CARE
Problem: Falls - Risk of:  Goal: Will remain free from falls  Description: Will remain free from falls  8/8/2021 2319 by Diandar Porras RN  Outcome: Met This Shift  8/8/2021 0923 by Lady Waldron RN  Outcome: Met This Shift  Goal: Absence of physical injury  Description: Absence of physical injury  8/8/2021 2319 by Diandra Porras RN  Outcome: Met This Shift  8/8/2021 0923 by Lady Waldron RN  Outcome: Met This Shift     Problem: Skin Integrity:  Goal: Will show no infection signs and symptoms  Description: Will show no infection signs and symptoms  Outcome: Met This Shift  Goal: Absence of new skin breakdown  Description: Absence of new skin breakdown  8/8/2021 2319 by Diandra Porras RN  Outcome: Met This Shift  8/8/2021 0923 by Lady Waldron RN  Outcome: Met This Shift     Problem: Pain:  Goal: Pain level will decrease  Description: Pain level will decrease  8/8/2021 2319 by Diandra Porras RN  Outcome: Met This Shift  8/8/2021 0923 by Lady Waldron RN  Outcome: Met This Shift  Goal: Control of acute pain  Description: Control of acute pain  8/8/2021 2319 by Diandra Porras RN  Outcome: Met This Shift  8/8/2021 0923 by Lady Waldron RN  Outcome: Met This Shift  Goal: Control of chronic pain  Description: Control of chronic pain  Outcome: Met This Shift

## 2021-08-09 NOTE — PROCEDURES
Asked to review resting nuclear images --> un interpretable --> stress test cancelled  Electronically signed by Nella Stanton MD on 8/9/2021 at 10:51 AM

## 2021-08-09 NOTE — PROGRESS NOTES
Progress Note  NEPHROLOGY    Reason for Consult: Evaluation of acute on chronic kidney disease    From 8/4/21 Consult note - History of Present Ilness: This is a 61 y.o.  male with a H/O CKD stage III followed by Dr. Esau Marte of our practice who now presents to ED on 8/2/2021 with worsening fatigue, shortness of breath and syncope. Admission labs included Pro-BNP 4,152, Procalcitonin 0.10, sodium 142, potassium 5.0, CO2 26, creatinine 2.1, lactic acid 4.1, troponin 24, WBC 10.6. CT of head without contrast showed no skull fracture or acute intracranial abnormality. CTA of chest with contrast showed no central pulmonary embolism or aortic dissection. Chest x-ray showed cardiomegaly with moderate pleural effusion with atelectasis likely CHF and/or pneumonia. 2 -4 mm nonspecific pulmonary nodules. Patient was subsequently admitted for further evaluation and work-up. Interval History:   8/6/21:Sitting on edge of bed, still sob with activity. 8/7: pt seen in room, weak, no cp  8/8: pt seen in room, no sob today  8/9/21- awake and alert. Stress testing cancelled this am    Past Medical History:        Diagnosis Date    Anxiety     Asthma     CAD (coronary artery disease) 2008    2 stents in 2008 .  CHF (congestive heart failure) (HCC)     Chills     Chronic sinusitis     Cocaine abuse ([address]     COPD (chronic obstructive pulmonary disease) (HCC)     Depression     Diabetes mellitus (HCC)     on insulin    GERD (gastroesophageal reflux disease)     H/O cardiovascular stress test     Hand fracture 3/2010    Fractured both hands.  Hepatitis C     Work up was negative. Saw specialist. Negative viral load.  History of tobacco abuse     Hyperlipidemia     Hypertension     Night sweats     NSTEMI (non-ST elevation myocardial infarction) ([address] 3/2010    Hospitalized.  Obesity     Oxygen dependent     Panic attacks     Pneumonia 3/2010     LLL.     Sleep apnea     Has CPAP machine    SOB (shortness of breath)     does not know settings       Past Surgical History:        Procedure Laterality Date    COLONOSCOPY  2011    COLONOSCOPY  9/23/15   [facility]ATH [facility] PROCEDURE  2/25/2009    [facility], cardiac cath/primary BM stent in proximal LAD.     OTHER SURGICAL HISTORY      anal fistula    TONSILLECTOMY         Current Medications:    Current Facility-Administered Medications: technetium sestamibi (CARDIOLITE) injection 35 millicurie, 35 millicurie, Intravenous, ONCE PRN  regadenoson (LEXISCAN) injection 0.4 mg, 0.4 mg, Intravenous, ONCE PRN  sodium chloride (OCEAN, BABY AYR) 0.65 % nasal spray 1 spray, 1 spray, Each Nostril, Q4H PRN  fluticasone (FLONASE) 50 MCG/ACT nasal spray 1 spray, 1 spray, Each Nostril, BID  melatonin tablet 3 mg, 3 mg, Oral, Nightly PRN  furosemide (LASIX) injection 40 mg, 40 mg, Intravenous, BID  insulin glargine (LANTUS) injection vial 30 Units, 30 Units, Subcutaneous, BID  perflutren lipid microspheres (DEFINITY) injection 1.65 mg, 1.5 mL, Intravenous, ONCE PRN  amLODIPine (NORVASC) tablet 10 mg, 10 mg, Oral, Daily  aspirin EC tablet 81 mg, 81 mg, Oral, Daily  atenolol (TENORMIN) tablet 50 mg, 50 mg, Oral, Daily  atorvastatin (LIPITOR) tablet 80 mg, 80 mg, Oral, Nightly  budesonide (PULMICORT) nebulizer suspension 500 mcg, 0.5 mg, Nebulization, BID  mirtazapine (REMERON) tablet 30 mg, 30 mg, Oral, Nightly  pantoprazole (PROTONIX) tablet 40 mg, 40 mg, Oral, QAM AC  sodium chloride flush 0.9 % injection 5-40 mL, 5-40 mL, Intravenous, 2 times per day  sodium chloride flush 0.9 % injection 5-40 mL, 5-40 mL, Intravenous, PRN  0.9 % sodium chloride infusion, 25 mL, Intravenous, PRN  enoxaparin (LOVENOX) injection 40 mg, 40 mg, Subcutaneous, Daily  ondansetron (ZOFRAN-ODT) disintegrating tablet 4 mg, 4 mg, Oral, Q8H PRN **OR** ondansetron (ZOFRAN) injection 4 mg, 4 mg, Intravenous, Q6H PRN  polyethylene glycol (GLYCOLAX) packet 17 g, 17 g, Oral, Daily PRN  acetaminophen (TYLENOL) tablet 650 mg, 650 mg, Oral, Q6H PRN **OR** acetaminophen (TYLENOL) suppository 650 mg, 650 mg, Rectal, Q6H PRN  ipratropium-albuterol (DUONEB) nebulizer solution 1 ampule, 1 ampule, Inhalation, Q4H WA  hydrALAZINE (APRESOLINE) tablet 25 mg, 25 mg, Oral, Q8H PRN  glucose (GLUTOSE) 40 % oral gel 15 g, 15 g, Oral, PRN  dextrose 50 % IV solution, 12.5 g, Intravenous, PRN  glucagon (rDNA) injection 1 mg, 1 mg, Intramuscular, PRN  dextrose 5 % solution, 100 mL/hr, Intravenous, PRN  insulin lispro (HUMALOG) injection vial 11 Units, 11 Units, Subcutaneous, TID WC  glucose (GLUTOSE) 40 % oral gel 15 g, 15 g, Oral, PRN  dextrose 50 % IV solution, 12.5 g, Intravenous, PRN  glucagon (rDNA) injection 1 mg, 1 mg, Intramuscular, PRN  dextrose 5 % solution, 100 mL/hr, Intravenous, PRN    Allergies:  Amoxicillin and Penicillins    Social History:      reports that he quit smoking about 13 years ago. His smoking use included cigarettes. He started smoking about 41 years ago. He has a 76.00 pack-year smoking history. He has never used smokeless tobacco. He reports that he does not drink alcohol and does not use drugs. Family History:     Family History   Problem Relation Age of Onset    Heart Failure Father     Diabetes Mother     Heart Surgery Brother          Review of Systems:       Pertinent positives stated above in HPI. All other systems were reviewed and were negative.     Physical exam:   Constitutional:  VITALS:  BP (!) 140/71   Pulse 68   Temp 96.4 °F (35.8 °C) (Temporal)   Resp 20   Ht 6' (1.829 m)   Wt (!) 379 lb 1 oz (171.9 kg)   SpO2 94%   BMI 51.41 kg/m²   CURRENT TEMPERATURE:  Temp: 96.4 °F (35.8 °C)  CURRENT RESPIRATORY RATE:  Resp: 20  CURRENT PULSE:  Pulse: 68  CURRENT BLOOD PRESSURE:  BP: (!) 140/71  24HR BLOOD PRESSURE RANGE:  Systolic (92XJR), KUD:198 , Min:135 , RXN:536   ; Diastolic (70PSR), TWL:73, Min:70, Max:78    24HR INTAKE/OUTPUT:      Intake/Output Summary (Last 24 hours) at 8/9/2021 1037  Last data filed at 8/9/2021 2424  Gross per 24 hour   Intake 90 ml   Output --   Net 90 ml       General appearance: alert, in no acute distress  Skin: No rashes or lesions on exposed skin  Neck: No JVD  Lungs: Clear, no adventitious sounds  Heart: RRR, no rub  Abdomen: Soft, non-tender, + bowel sounds  Extremities: 1+ BLE edema, dressing to right foot  Neurologic: Mental status: Alert, oriented, thought content appropriate      DATA:      CBC:   Lab Results   Component Value Date    WBC 10.2 08/09/2021    RBC 5.32 08/09/2021    HGB 14.2 08/09/2021    HCT 47.5 08/09/2021    MCV 89.3 08/09/2021    MCH 26.7 08/09/2021    MCHC 29.9 08/09/2021    RDW 14.9 08/09/2021     08/09/2021    MPV 11.5 08/09/2021     BMP:    Lab Results   Component Value Date     08/09/2021    K 4.2 08/09/2021     08/09/2021    CO2 31 08/09/2021    BUN 30 08/09/2021    LABALBU 3.6 08/02/2021    LABALBU 4.2 09/20/2011    CREATININE 1.8 08/09/2021    CALCIUM 9.3 08/09/2021    GFRAA 47 08/09/2021    LABGLOM 39 08/09/2021    GLUCOSE 174 08/09/2021    GLUCOSE 126 09/20/2011       RAD:  XR FOOT RIGHT (MIN 3 VIEWS)    Result Date: 8/2/2021  EXAMINATION: THREE XRAY VIEWS OF THE RIGHT FOOT 8/2/2021 3:02 pm COMPARISON: None. HISTORY: ORDERING SYSTEM PROVIDED HISTORY: foot wound TECHNOLOGIST PROVIDED HISTORY: Reason for exam:->foot wound What reading provider will be dictating this exam?->CRC FINDINGS: There is no evidence of acute fracture. There is normal alignment of the tarsometatarsal joints. No acute joint abnormality. No focal osseous lesion. Diffused edema the right foot. Nonspecific edema of the foot. No acute fractures or dislocations in the right foot.      CT HEAD WO CONTRAST    Result Date: 8/2/2021  EXAMINATION: CT OF THE HEAD WITHOUT CONTRAST  8/2/2021 5:23 pm TECHNIQUE: CT of the head was performed without the administration of intravenous contrast. Dose modulation, iterative reconstruction, and/or weight based adjustment of the mA/kV was utilized to reduce the radiation dose to as low as reasonably achievable. COMPARISON: None. HISTORY: ORDERING SYSTEM PROVIDED HISTORY: LOC TECHNOLOGIST PROVIDED HISTORY: Has a \"code stroke\" or \"stroke alert\" been called? ->No Reason for exam:->LOC Decision Support Exception - unselect if not a suspected or confirmed emergency medical condition->Emergency Medical Condition (MA) What reading provider will be dictating this exam?->CRC FINDINGS: BRAIN/VENTRICLES: No mass effect, edema or hemorrhage is seen. Mild volume loss is seen in the cerebrum with mild chronic microvascular ischemic changes. No hydrocephalus or extra-axial fluid is seen. ORBITS: The visualized portion of the orbits demonstrate no acute abnormality. SINUSES:  A mucous retention cyst is seen in the left maxillary sinus. Mild mucosal thickening in the ethmoid sinuses. The remainder of the visualized paranasal sinuses and the mastoids are grossly clear. SOFT TISSUES/SKULL:  No acute abnormality of the visualized skull or soft tissues. No skull fracture or acute intracranial abnormality. CTA PULMONARY W CONTRAST    Result Date: 8/2/2021  EXAMINATION: CTA OF THE CHEST 8/2/2021 5:23 pm TECHNIQUE: CTA of the chest was performed after the administration of intravenous contrast.  Multiplanar reformatted images are provided for review. MIP images are provided for review. Dose modulation, iterative reconstruction, and/or weight based adjustment of the mA/kV was utilized to reduce the radiation dose to as low as reasonably achievable. COMPARISON: Previous examination on the same day 3 of is prior.  HISTORY: ORDERING SYSTEM PROVIDED HISTORY: hypoxic TECHNOLOGIST PROVIDED HISTORY: Reason for exam:->hypoxic Decision Support Exception - unselect if not a suspected or confirmed emergency medical condition->Emergency Medical Condition (MA) What reading provider will be dictating this exam?->CRC FINDINGS: The examination is technically limited due to suboptimal contrast opacification and patient body habitus. Given this limitation, there are no filling defects in the main pulmonary artery and the central branches. The distal subsegmental and peripheral vessels are poorly opacified and cannot be evaluated for distal subsegmental pulmonary embolism. There is cardiomegaly. Moderately enlarged mediastinal lymph nodes are identified with lymph nodes measuring up to 1.3 cm. Trachea and major bronchi are patent. There is atelectasis and moderate pleural effusion in the lower lobes with ground-glass opacities in the upper lobes. 2-4 mm nonspecific pulmonary nodules are identified in the right upper lobe and right lower lobe without change. The liver is fatty infiltrated. Degenerative changes are identified in the thoracic spine. .     No central pulmonary embolism or aortic dissection. The distal subsegmental and peripheral vessels are poorly evaluated due to poor contrast. Cardiomegaly with moderate pleural effusion with atelectasis likely CHF and or pneumonia. 2-4 mm nonspecific pulmonary nodules. Consider surveillance according to Fleischner society guidelines. CT Lung Screen (Initial/Annual)    Result Date: 8/2/2021  EXAMINATION: LOW DOSE SCREENING CT OF THE CHEST WITHOUT CONTRAST 8/2/2021 1:24 pm TECHNIQUE: Low dose lung cancer screening CT of the chest was performed without the administration of intravenous contrast.  Multiplanar reformatted images are provided for review. Dose modulation, iterative reconstruction, and/or weight based adjustment of the mA/kV was utilized to reduce the radiation dose to as low as reasonably achievable. COMPARISON: July 6, 2020 HISTORY: ORDERING SYSTEM PROVIDED HISTORY: Cigarette nicotine dependence in remission TECHNOLOGIST PROVIDED HISTORY: Age: 61 y.o.  Smoking History: Social History Tobacco Use Smoking status: Former Smoker Packs/day: 2.00 Years: 38.00 Pack years: 68 Types: Cigarettes Start date: 36 Quit date: 2008 Years since quittin.9 Smokeless tobacco: Never Used Vaping Use Vaping Use: Never used Alcohol use: No Alcohol/week: 0.0 standard drinks Comment: was heavy drinker; quit age 35;  drinks 1-2 cups of coffee daily Drug use: No Comment: Acid, marijuana, cocaine, STOPPED ALL DRUGS  Pack years: 68 Last CT lung screen: 2020 Is there documentation of shared decision making? ->Yes Does the patient show any signs or symptoms of lung cancer? ->No Is this the first (baseline) CT or an annual exam?->Annual Is this a low dose CT or a routine CT?->Low Dose CT Smoking Status? ->Former Smoker Date quit smoking? (must be within 15 years)->08 Smoking packs per day?->2 FINDINGS: Mediastinum:  Evaluation of soft tissue structures is severely limited due to patient's body habitus and low-dose technique. There is an enlarged precarinal lymph node measuring 12 mm in short axis. This is new from the prior examination. An AP window lymph node is also at least mildly enlarged measuring 12 mm in short axis. Heart size is within normal limits. Faint coronary artery calcifications are noted, potentially a marker for coronary artery disease. The thoracic esophagus is decompressed, limiting assessment. No obvious esophageal abnormality. Lungs/Pleura: The central airways are patent. No evidence of a pneumothorax. There are small bilateral pleural effusions with bibasilar atelectasis. A 2 mm subpleural right upper lobe pulmonary nodule is noted on axial image 55. A 3-4 mm right middle lobe nodule on axial image 69 is stable. Upper Abdomen:  Evaluation of upper abdominal structures is essentially nondiagnostic due to severe photon starvation artifact. Soft Tissues/Bones: Evaluation of osseous structures is limited. No convincing evidence of acute osseous abnormality. Significantly limited examination as described.  There are small bilateral pleural effusions with bibasilar airspace opacities, most in keeping with compressive atelectasis. Several 2-4 mm right-sided pulmonary nodules are felt to be stable. New mediastinal lymphadenopathy, of uncertain etiology or clinical significance. Given these findings, correlation with diagnostic CT of the chest is recommended chest. LUNG RADS: Per ACR Lung-RADS Version 1.1 Category 2S, Benign appearance or behavior. Management:  Continue annual lung screening with LDCT in 12 months. RECOMMENDATIONS: If you would like to register your patient with the [place] [id number], please contact the Nurse Navigator at [phone]. Assessment/Plan    1. Acute on CKD Stage 3B  Baseline over the past 2 years 1.3-1.5  Admitting serum creatinine 2.1  HCTZ and ARB held upon admission  No aggressive work-up planned as this patient is well-known to our practice  Follow daily labs and strict intake and output  Avoid NSAIDs  8/6 Cr 2.1>1.7->1.8  follow closely with diuresis    2. Shortness of breath  Acute on chronic respiratory failure  Followed by the pulmonary team  8/5 Lasix increased to 40 mg IV BID  UO last 24 hrs ? Not recorded    3. Hypertension with CKD stage 1-4  Last recorded echo showed EF of 65% in March 2016: For repeat  BP at/near goal< 130/80 on current meds    4. Type II DM  Care per the primary team    5. Chronic kidney disease stage IIIb  Based on history of hypertension and diabetes  Baseline over the past 2 years 1.3-1.5      JENELLE Blanco - CNP  8/9/2021  10:37 AM     Pt seen and examined agree with above  I/o ??   Continue diuresis  Follow cr  Kartik Stevens MD

## 2021-08-09 NOTE — PLAN OF CARE
Problem: Falls - Risk of:  Goal: Will remain free from falls  Description: Will remain free from falls  8/8/2021 2319 by Veronica Sun RN  Outcome: Met This Shift  8/8/2021 0923 by Sherri Loera RN  Outcome: Met This Shift  Goal: Absence of physical injury  Description: Absence of physical injury  8/8/2021 2319 by Veronica Sun RN  Outcome: Met This Shift  8/8/2021 0923 by Sherri Loera RN  Outcome: Met This Shift     Problem: Skin Integrity:  Goal: Will show no infection signs and symptoms  Description: Will show no infection signs and symptoms  Outcome: Met This Shift  Goal: Absence of new skin breakdown  Description: Absence of new skin breakdown  8/8/2021 2319 by Veronica Sun RN  Outcome: Met This Shift  8/8/2021 0923 by Sherri Loera RN  Outcome: Met This Shift     Problem: Pain:  Goal: Pain level will decrease  Description: Pain level will decrease  8/8/2021 2319 by Veronica Sun RN  Outcome: Met This Shift  8/8/2021 0923 by Sherri Loera RN  Outcome: Met This Shift  Goal: Control of acute pain  Description: Control of acute pain  8/8/2021 2319 by Veronica Sun RN  Outcome: Met This Shift  8/8/2021 0923 by Sherri Loera RN  Outcome: Met This Shift  Goal: Control of chronic pain  Description: Control of chronic pain  Outcome: Met This Shift

## 2021-08-09 NOTE — PROGRESS NOTES
Occupational Therapy  OT SESSION ATTEMPT      Date:2021  Patient Name: Juan Lopez  MRN: [id number]  : 1960  [facility]: [facility]     Attempted OT session this date:    [] unavailable due to other medical staff currently with pt   [] on hold per nursing staff   [] on hold per nursing staff secondary to lab / radiology results    [] declined treatment  this date due to ____. Benefits of participation in therapy reviewed with pt. [x] off unit for testing this am   [] Other:      Will reattempt OT session as schedule permits when patient is appropriate.      Malena Shaffer, [address] [facility], OTR/L [id number]

## 2021-08-10 PROBLEM — J18.9 PNEUMONIA: Status: RESOLVED | Noted: 2021-01-01 | Resolved: 2021-01-01

## 2021-08-10 PROBLEM — R06.02 SHORTNESS OF BREATH: Status: RESOLVED | Noted: 2021-01-01 | Resolved: 2021-01-01

## 2021-08-10 NOTE — PROGRESS NOTES
Patient unable to walk without a walker for ambulating pulse ox, bariatric walker ordered per RN this morning.  RN waiting for supply in order to be able to ambulate patient

## 2021-08-10 NOTE — PROGRESS NOTES
[facility] Attending    S: 61 y.o. male with a history of chronic respiratory failure on 4-6 L of oxygen at home, COPD with bipap at night, htn, CKD 3b, Dm2 (a1c 6.3), CAD s/p stent, MDD, anxiety, gerd, bph, HFpEF, tobacco/remote cocaine use, obesity, and + hcv ab who presented after shopping in [facility] without the use of his oxygen. He walked to the car and he passed out. He had no cp, palpitations or seizure-like activity. He was short of breath before and walking throughout the store. He was told his lips were blue. Also with increasing dyspnea over the past few months. His oxygen saturation was80% on 4L in the ER. He also was found to have an ARLINE on CKD. He was placed on a NRB mask in the ER. Today, reports no new symptoms or concerns. Dyspnea is stable overall, though O2 requirement is slightly higher than baseline currently. O: VS- Blood pressure 120/76, pulse 75, temperature 97.7 °F (36.5 °C), temperature source Temporal, resp. rate 20, height 6' (1.829 m), weight (!) 379 lb 1 oz (171.9 kg), SpO2 94 %. Exam is as noted by resident with the following changes, additions or corrections:  Gen: NAD on O2 by NC   HEENT: NCAT, PERRL  CV-RRR no M/R/G   Lungs-diminished, unlabored   ABD-soft nonttp no masses   Ext-no C/C; trace-1+ edema bilaterally     Impressions:   Principal Problem:    Shortness of breath  Active Problems: Morbid obesity    Emphysema/COPD    Hypertension    Diabetes mellitus type 2 in obese (HCC)    CKD (chronic kidney disease) stage 3, GFR 30-59 ml/min ([facility])    Depression    CAD (coronary artery disease)    Pneumonia    Foot laceration    Hypoxia    Syncope and collapse    Abnormal echocardiogram    EKG, abnormal  Resolved Problems:    * No resolved hospital problems. *      Plan:  Pt requiring increased oxygen with acute on chronic respiratory failure. CT with pleural effusions, doubt infection. IV Lasix, echo noted.   Diastolic function was unable to be evaluated, but nl EF. Elevated BNP. Follow I and O for hx of HFpEF and suspected worsening along with the effusions/copd. Also on nebs. Cardiology management appreciated. Adjust insulin for bg. Metformin on hold; plan to stop due to renal function and HF. Evaristo on ckd. Appreciate renal management. Will need tobacco cessation. PT/OT  Podiatry following pt and placed 4-5 sutures rt interdigital space - needs outpatient follow up for suture removal.    Follow up outpatient with Dr. Melissa Mckeon for depression  Discuss compliance with use of oxygen on discharge at home  Strict I/os     Attending Physician Statement  I have reviewed the chart and seen the patient with the resident(s). I personally reviewed images, EKG's and similar tests, if present. I personally reviewed and performed key elements of the history and exam.  I have reviewed and confirmed student and/or resident history and exam with changes as indicated above. I agree with the assessment, plan and orders as documented by the resident. Please refer to the resident and/or student note for additional information.       Imtiaz Crandall,

## 2021-08-10 NOTE — DISCHARGE SUMMARY
Discharge Summary    Peewee Antonio  :  1960  MRN:  [id number]    Admit date:  2021  Discharge date:   8/10/2021      Admitting Physician:  Jackie Manning MD    Discharge Diagnoses:    CHF  COPD  SOB  CKD  HTN  Right 4th digit laceration  Type 2 DM  HLD  GERD  Depression  Obesity  CAD  Hepatitis C  Cocaine use    Admission Condition:  poor    Discharged Condition:  fair    Hospital Course:  Peewee Antonio is a 61 y.o. male with a PMH of HTN, COPD, CHF, Type 2 diabetes, CAD status post stent placement, Depression, Hyperlipidemia, Hepatitis C, Cocaine use and GERD. The patient presented  to ED for Loss of Consciousness. He reports worsening shortness of breath for the past 3 months. Patient lost consciousness on 21 after walking around a store without oxygen. No seizures, no bladder incontinence. CT pulmonary with contrast showed cardiomegaly and moderate bilateral pleural effusions with atelectasis, likely CHF vs pneumonia. Also 2-4 mm nonspecific pulmonary nodules. Patient desaturated down to 80% in the ED on 4 L and was placed on 15 L nonrebreather mask. Echo : EF 45-50% (last EF 65% ), dilated RV and dilated aortic root. Patient has history of HFpEF, followed with Dr. Anthony Gill. Patient on 6 L NC at home. Patient is currently on 7 L O2 NC. Unlikely pneumonia as patient is not complaining of any cough or sputum production, no fever, normal WBCs, and procal not significantly elevated. Pulmonology does not consider that the nodules should be followed at this moment. Suspect CHF due to elevated ProBNP. Started on 40 mg IV Lasix . Strict Is and Os and daily weights. Taking Pulmicort BID, Flonase and Duonebs. Stress test  cancelled due to poor quality images. Cardiology adjusted the medication before discharge. Trazodone was held due to QTc 474. Oxygen compliance was discussed with the patient previous to discharge. Renal function was monitored during the hospital course.  Cr 2.1 mg/dL on admission, 1.8 mg/dL today at discharge. FeUrea 48.1% suspecting an intrinsic renal disease. Nephrology was consulted - Dr. Chele Sanon. HCTZ, Losartan, and Metformin were held due to ARLINE. Blodd pressure was monitored during the hospital course with a goal of < 130/80. /77 at discharge. Taking Toprol and Amlodipine. Glucose was monitored during the hospital course with a goal of < 140-180. Glucose 139 at discharge. Taking Adult low carbohydrates diet, Lantus 30 U BID and Humalog 11 U with meals (pharmacy replacement for patient's home regimen). Home regimen: Novolog 20U daily, Novolin 70/30 55U BID. Patient will take home regimen after discharge. Metformin held due to ARLINE. The patient has a right deep foot laceration. Normal foot X-ray. The wound was cleaned daily and sutured. Podiatry and Wound care team were following him during the hospital course. Dr Yoon Peck, Psychologist, was following the patient during the hospital coruse.     At follow-up Visit:  1) BMP re-check  2) Held Trazadone 2/2 to QTC prolonged  3) Held Metformin 2/2 to kidney injury  4) Please adjust diabetes medications if patients BG is high (as metformin was stopped)  5) Make sure he has follow-up with Cardiology (already in the plan)      Discharge Medications:         Medication List      START taking these medications    furosemide 40 MG tablet  Commonly known as: LASIX  Take 1 tablet by mouth daily  Start taking on: August 11, 2021     metoprolol succinate 50 MG extended release tablet  Commonly known as: TOPROL XL  Take 1 tablet by mouth daily  Start taking on: August 11, 2021        CONTINUE taking these medications    albuterol (2.5 MG/3ML) 0.083% nebulizer solution  Commonly known as: PROVENTIL  Take 3 mLs by nebulization 4 times daily inhale contents of 1 vial in nebulizer four times a day     amLODIPine 10 MG tablet  Commonly known as: NORVASC  take 1 tablet by mouth once daily     aspirin 81 MG EC tablet  Take 1 tablet by mouth daily     atorvastatin 80 MG tablet  Commonly known as: LIPITOR  take 1 tablet by mouth once daily     B-D INS SYR ULTRAFINE 1CC/30G 30G X 1/2\" 1 ML Misc  Generic drug: Insulin Syringe-Needle U-100  1 each by Does not apply route 3 times daily     beclomethasone 80 MCG/ACT inhaler  Commonly known as: QVAR     * blood glucose test strips strip  Commonly known as: ASCENSIA AUTODISC VI;ONE TOUCH ULTRA TEST VI  1 each by Does not apply route 3 times daily     * blood glucose test strips  Test 3 times a day & as needed for symptoms of irregular blood glucose. Dispense sufficient amount for indicated testing frequency plus additional to accommodate PRN testing needs. COMBIVENT IN     docusate sodium 100 MG capsule  Commonly known as: DocQLace  Take 1 capsule by mouth 2 times daily     finasteride 5 MG tablet  Commonly known as: PROSCAR  Take 1 tablet by mouth daily     gabapentin 800 MG tablet  Commonly known as: Neurontin  Take 1 tablet by mouth nightly for 30 days. * glucose monitoring kit  1 kit by Does not apply route daily     * ONE TOUCH ULTRA 2 w/Device Kit  1 kit by Does not apply route daily     Handicap Placard Misc  by Does not apply route     insulin 70-30 (70-30) 100 UNIT per ML injection vial  Commonly known as: NovoLIN 70/30  Inject 55 Units into the skin 2 times daily     Insulin Syringes (Disposable) U-100 1 ML Misc  1 each by Does not apply route daily     Lancets Misc  Check sugars as directed.  Curtis Lakhani for pharmacy to substitute     mirtazapine 30 MG tablet  Commonly known as: REMERON  take 1 tablet by mouth every evening     NovoLOG PenFill 100 UNIT/ML injection cartridge  Generic drug: insulin aspart  Inject 20 Units into the skin daily     omeprazole 20 MG delayed release capsule  Commonly known as: PRILOSEC  Take 1 capsule by mouth daily     OXYGEN     promethazine 25 MG tablet  Commonly known as: PHENERGAN  Take 1 tablet by mouth every 6 hours as needed for Nausea     tamsulosin 0.4 MG capsule  Commonly known as: FLOMAX  take 1 capsule by mouth once daily         * This list has 4 medication(s) that are the same as other medications prescribed for you. Read the directions carefully, and ask your doctor or other care provider to review them with you. STOP taking these medications    atenolol 50 MG tablet  Commonly known as: TENORMIN     fish oil 1000 MG Caps     hydroCHLOROthiazide 25 MG tablet  Commonly known as: HYDRODIURIL     losartan 25 MG tablet  Commonly known as: COZAAR     metFORMIN 1000 MG tablet  Commonly known as: GLUCOPHAGE     traZODone 100 MG tablet  Commonly known as: DESYREL           Where to Get Your Medications      These medications were sent to [facility], [address] [facility]. Salome Guadarrama [phone] Sarah Curtis [phone]  [address] [place] [address], [address] [place] [id number]    Phone: [phone]   · furosemide 40 MG tablet  · metoprolol succinate 50 MG extended release tablet         Consults:    Cardiology, Pulmonology, Nephrology, PT/OT, SW, Podiatry    Significant Diagnostic Studies:    Labs, Echo, ECG, CXR, Right foot X-ray, CT head, CTA pulmonary, CT lung    Labs:  Na/K/Cl/CO2:  142/3.9/103/29 (08/10 4292)  BUN/Cr/glu/ALT/AST/amyl/lip:  31/1.8/--/--/--/--/-- (08/10 6223)  WBC/Hgb/Hct/Plts:  10.5/14.0/46.9/261 (08/10 4209)  [unfilled]  estimated creatinine clearance is 71 mL/min (A) (based on SCr of 1.8 mg/dL (H)). New Imaging:  Echo Complete  Result Date: 8/5/2021  Conclusions   Summary  Left ventricle is normal in size . Ejection fraction is visually estimated at 45-50%. Overall ejection fraction mildly decreased . Mid to distal anterolateral and apical walls are hypokinetic. Normal left ventricle wall thickness. Indeterminate diastolic function. Moderately dilated right ventricle. Right ventricle global systolic function is mildly reduced . TAPSE 15 mm. No hemodynamically significant aortic stenosis is present. Mild mucosal thickening in the ethmoid sinuses. The remainder of the visualized paranasal sinuses and the mastoids are grossly clear. SOFT TISSUES/SKULL:  No acute abnormality of the visualized skull or soft tissues. No skull fracture or acute intracranial abnormality. CTA PULMONARY W CONTRAST  Result Date: 8/2/2021  FINDINGS: The examination is technically limited due to suboptimal contrast opacification and patient body habitus. Given this limitation, there are no filling defects in the main pulmonary artery and the central branches. The distal subsegmental and peripheral vessels are poorly opacified and cannot be evaluated for distal subsegmental pulmonary embolism. There is cardiomegaly. Moderately enlarged mediastinal lymph nodes are identified with lymph nodes measuring up to 1.3 cm. Trachea and major bronchi are patent. There is atelectasis and moderate pleural effusion in the lower lobes with ground-glass opacities in the upper lobes. 2-4 mm nonspecific pulmonary nodules are identified in the right upper lobe and right lower lobe without change. The liver is fatty infiltrated. Degenerative changes are identified in the thoracic spine. .   No central pulmonary embolism or aortic dissection. The distal subsegmental and peripheral vessels are poorly evaluated due to poor contrast. Cardiomegaly with moderate pleural effusion with atelectasis likely CHF and or pneumonia. 2-4 mm nonspecific pulmonary nodules. Consider surveillance according to Fleischner society guidelines. CT Lung Screen (Initial/Annual)  Result Date: 8/2/2021  FINDINGS: Mediastinum:  Evaluation of soft tissue structures is severely limited due to patient's body habitus and low-dose technique. There is an enlarged precarinal lymph node measuring 12 mm in short axis. This is new from the prior examination. An AP window lymph node is also at least mildly enlarged measuring 12 mm in short axis.   Heart size is within normal limits. Faint coronary artery calcifications are noted, potentially a marker for coronary artery disease. The thoracic esophagus is decompressed, limiting assessment. No obvious esophageal abnormality. Lungs/Pleura: The central airways are patent. No evidence of a pneumothorax. There are small bilateral pleural effusions with bibasilar atelectasis. A 2 mm subpleural right upper lobe pulmonary nodule is noted on axial image 55. A 3-4 mm right middle lobe nodule on axial image 69 is stable. Upper Abdomen:  Evaluation of upper abdominal structures is essentially nondiagnostic due to severe photon starvation artifact. Soft Tissues/Bones: Evaluation of osseous structures is limited. No convincing evidence of acute osseous abnormality. Significantly limited examination as described. There are small bilateral pleural effusions with bibasilar airspace opacities, most in keeping with compressive atelectasis. Several 2-4 mm right-sided pulmonary nodules are felt to be stable. New mediastinal lymphadenopathy, of uncertain etiology or clinical significance. Given these findings, correlation with diagnostic CT of the chest is recommended chest. LUNG RADS: Per ACR Lung-RADS Version 1.1 Category 2S, Benign appearance or behavior. Management:  Continue annual lung screening with LDCT in 12 months. RECOMMENDATIONS: If you would like to register your patient with the [place] [address], please contact the Nurse Navigator at [phone]. Treatments:  Lasix, Lantus insulin, Humalog insulin, Pulmicort, Flonase, Duonebs, Toprol, Amlodipine, Lipitor and Protonix    Discharge Exam:  Constitutional:       General: He is not in acute distress. Appearance: Normal appearance. He is obese. He is not ill-appearing. HENT:      Head: Normocephalic and atraumatic. Cardiovascular:      Rate and Rhythm: Normal rate and regular rhythm. Pulses: Normal pulses.       Heart sounds: Normal heart sounds. No murmur heard. Pulmonary:      Effort: Pulmonary effort is normal. No respiratory distress. Breath sounds: Normal breath sounds. Comments: NC 7 L O2  Abdominal:      General: Bowel sounds are normal.      Palpations: Abdomen is soft. Tenderness: There is no guarding or rebound. Musculoskeletal:         General: No swelling. Right lower leg: Edema present. Left lower leg: Edema present. Feet:       Comments: Trace bilateral non-pitting edema until mid shins   Feet:      Comments: Right 4th digit laceration  Skin:     General: Skin is warm. Coloration: Skin is not jaundiced or pale. Findings: Lesion present. No rash. Comments: Abrasions in RLQ abdomen, right knee and right proximal shin   Neurological:      General: No focal deficit present. Mental Status: He is alert. Mental status is at baseline. Psychiatric:         Mood and Affect: Mood normal.         Behavior: Behavior normal.         Judgment: Judgment normal.    Disposition:  Home    Future Appointments   Date Time Provider Alex Anders   9/7/2021  1:30 PM Asia Vasquez MD [facility]     More than 30 minutes was spent in preparation of this patient's discharge including, but not limited to, examination, preparation of documents, prescription preparation, counseling and coordination. Signed:   Amanda Ho MD  8/10/2021, 7:38 PM

## 2021-08-10 NOTE — CARE COORDINATION
Updated bedside RN Meaghan Connell pt needs ambulating pulse ox now; Dr Adriane Lowe will update pt's O2 order once ambulatory pulse ox is documented; pt has home going oxygen at bedside and states it's enough for the ride home; I notified Paulian Beltran with [facility] to look for the order, and ambulatory PO note.

## 2021-08-10 NOTE — PROGRESS NOTES
Department of Podiatry  Progress Note    SUBJECTIVE:  Mr. Tate Benitez was evaluated at bedside this moring for RLE fourth digit laceration. No acute events overnight. Patient denies any N/V/D/F/C and has no other pedal complaints at this time. OBJECTIVE:    Scheduled Meds:   fluticasone  1 spray Each Nostril BID    furosemide  40 mg Intravenous BID    insulin glargine  30 Units Subcutaneous BID    amLODIPine  10 mg Oral Daily    aspirin  81 mg Oral Daily    atenolol  50 mg Oral Daily    atorvastatin  80 mg Oral Nightly    budesonide  0.5 mg Nebulization BID    mirtazapine  30 mg Oral Nightly    pantoprazole  40 mg Oral QAM AC    sodium chloride flush  5-40 mL Intravenous 2 times per day    enoxaparin  40 mg Subcutaneous Daily    ipratropium-albuterol  1 ampule Inhalation Q4H WA    insulin lispro  11 Units Subcutaneous TID WC     Continuous Infusions:   sodium chloride      dextrose      dextrose       PRN Meds:.technetium sestamibi, regadenoson, sodium chloride, melatonin, perflutren lipid microspheres, sodium chloride flush, sodium chloride, ondansetron **OR** ondansetron, polyethylene glycol, acetaminophen **OR** acetaminophen, hydrALAZINE, glucose, dextrose, glucagon (rDNA), dextrose, glucose, dextrose, glucagon (rDNA), dextrose    Allergies   Allergen Reactions    Amoxicillin Hives and Shortness Of Breath    Penicillins Hives and Shortness Of Breath     TRIAMOX       /77   Pulse 67   Temp 97.8 °F (36.6 °C) (Temporal)   Resp 19   Ht 6' (1.829 m)   Wt (!) 379 lb 1 oz (171.9 kg)   SpO2 99%   BMI 51.41 kg/m²       EXAM:  -Dressings this morning were clean, dry and intact without strike-through  -No noted dishevelment  -No posterior calf pain on palpation b/l. No pain with movement of digits    PHYSICAL EXAM AS OF 8/9/21:  VASCULAR:  DP and PT pulses are faint, possibly secondary to edema. CFT delayed B/L.   Warm to warm from the tibial tuberosity to the distal aspect of the digits dorsally. No hair growth noted to the distal aspects dorsally.     NEUROLOGIC:  Protective sensation is diminished b/l     DERM:  Laceration site noted to the medial aspect of the fourth right digit. Sutures noted to laceration. Sutures intact and in intended position. No noted dehiscence. Coapting as expected, skin edges well adhered. Not macerated. No noted pus or drainage. No SOI  Ecchymosis noted to digits 2-4 on RLE is resolving. Edema noted to RLE, resolving as well      MUSCULOSKELETAL: MMT Deferred. Equinus b/l       Scheduled Meds:   fluticasone  1 spray Each Nostril BID    furosemide  40 mg Intravenous BID    insulin glargine  30 Units Subcutaneous BID    amLODIPine  10 mg Oral Daily    aspirin  81 mg Oral Daily    atenolol  50 mg Oral Daily    atorvastatin  80 mg Oral Nightly    budesonide  0.5 mg Nebulization BID    mirtazapine  30 mg Oral Nightly    pantoprazole  40 mg Oral QAM AC    sodium chloride flush  5-40 mL Intravenous 2 times per day    enoxaparin  40 mg Subcutaneous Daily    ipratropium-albuterol  1 ampule Inhalation Q4H WA    insulin lispro  11 Units Subcutaneous TID WC     Continuous Infusions:   sodium chloride      dextrose      dextrose       PRN Meds:.technetium sestamibi, regadenoson, sodium chloride, melatonin, perflutren lipid microspheres, sodium chloride flush, sodium chloride, ondansetron **OR** ondansetron, polyethylene glycol, acetaminophen **OR** acetaminophen, hydrALAZINE, glucose, dextrose, glucagon (rDNA), dextrose, glucose, dextrose, glucagon (rDNA), dextrose    RADIOLOGY:  XR CHEST (2 VW)   Final Result   Development of a discrete vague ill-defined patchy ground-glass density or   infiltrates in the mid lower aspect of both lungs with some signs of a   perihilar vascular congestion. Cannot exclude underline bilateral pneumonia. Please correlate clinically. CT HEAD WO CONTRAST   Final Result   No skull fracture or acute intracranial abnormality. CTA PULMONARY W CONTRAST   Final Result   No central pulmonary embolism or aortic dissection. The distal subsegmental   and peripheral vessels are poorly evaluated due to poor contrast.      Cardiomegaly with moderate pleural effusion with atelectasis likely CHF and   or pneumonia. 2-4 mm nonspecific pulmonary nodules. Consider surveillance according to   Fleischner society guidelines. XR FOOT RIGHT (MIN 3 VIEWS)   Final Result   Nonspecific edema of the foot. No acute fractures or dislocations in the right foot. XR CHEST (2 VW)    (Results Pending)     /77   Pulse 67   Temp 97.8 °F (36.6 °C) (Temporal)   Resp 19   Ht 6' (1.829 m)   Wt (!) 379 lb 1 oz (171.9 kg)   SpO2 99%   BMI 51.41 kg/m²     LABS:    Recent Labs     08/09/21  0726 08/10/21  0704   WBC 10.2 10.5   HGB 14.2 14.0   HCT 47.5 46.9    261        Recent Labs     08/09/21  0726      K 4.2      CO2 31*   BUN 30*   CREATININE 1.8*        No results for input(s): PROT, INR, APTT in the last 72 hours. ASSESSMENT:    1. RLE laceration -POA  2. RLE Trauma  3. RLE edema, pain  4. Falls  5. DM with neuropathic changes     Shortness of breath    Morbid obesity    Emphysema/COPD    Hypertension    Diabetes mellitus type 2 in obese ([facility])    CKD (chronic kidney disease) stage 3, GFR 30-59 ml/min ([facility])    Depression    CAD (coronary artery disease)    Pneumonia    Foot laceration    Hypoxia              PLAN:     - Patient was examined and evaluated. Reviewed patient's recent lab results and pertinent diagnostic imaging. Reviewed ancillary service notes. - Patient had laceration sutured previously at bedside. Dressings this mornign were clean,dry and inatct. Will continue QoD (MWF)dressing changes with betadine, xeroform, DSD.   - Antibiotics as per ID/IM   - X-rays: 1.Edema              2.No acute fractures or dislocations  - Prevalon boots ordered.  No noted today  - Surgical shoe noted at bedside  - 50% WB to RLE heel as tolerated in surgical shoe  - Discussed patient with Dr. Kierra Camara   - Will continue to follow patient while they are in-house. Patient should follow up in office within one week of discharge. Stable for discharge from Podiatric stand point once cleared from all other teams on board.      Polly Castanon   [phone]

## 2021-08-10 NOTE — PROGRESS NOTES
Occupational Therapy  OCCUPATIONAL THERAPY RE-EVALUATION    [facility] [address] [address] [facility]  [address] [place] [address]    Date:8/10/2021                                             Patient Name: Felice Hernandez  MRN: [id number]  : 1960  Room: [address] [place] [address]     Evaluating OT: Kirit Coombe, OTD, OTR/L; #[id number]        Re-Evaluating OT: Eagle Coombe, OTD, OTR/L; #[id number]     Referring Provider: Jessica Patiño MD  Specific Provider Orders/Date: OT Evaluation and Treatment, 2021     Diagnosis: Pneumonia [J18.9]  Hypoxia [R09.02]   - Presented to hospital after LOC at store  - Patient with R foot laceration, (-) for fx per imaging (2021)    - Patient with OT re-evaluation secondary to podiatry assessment with patient s/p 4-5 sutures applied to R foot (21). Per podiatry patient R LE 50% PWB to heel with surgical shoe  Surgery: none   Pertinent Medical History: anxiety, asthma, CAD, CHF, cocaine abuse, depression, DM, GERD, B hand fx (), hep C, HLD, HTN, NSTEMI, obesity, O2 dependent, PNA, panic attacks, SOB         Precautions:  Fall Risk, tele, HFNC supplemental O2, impulsivity, alarm, R LE 50% PWB to heel with surgical shoe        Assessment of current deficits   [x]? Functional mobility             [x]?ADLs           [x]? Strength                  [x]? Cognition   [x]? Functional transfers           [x]? IADLs         [x]? Safety Awareness   [x]? Endurance   [x]? Fine Coordination              [x]? Balance      []? Vision/perception   []? Sensation     []? Gross Motor Coordination  []? ROM           []?  Delirium                   []? Motor Control      OT PLAN OF CARE   OT POC based on physician orders, patient diagnosis and results of clinical assessment     Frequency/Duration: 1-3 days/wk for 2 weeks PRN   Specific OT Treatment Interventions to include:   * Instruction/training on adapted ADL techniques and AE recommendations to increase functional independence within precautions       * Training on energy conservation strategies, correct breathing pattern and techniques to improve independence/tolerance for self-care routine  * Functional transfer/mobility training/DME recommendations for increased independence, safety, and fall prevention  * Patient/Family education to increase follow through with safety techniques and functional independence  * Recommendation of environmental modifications for increased safety with functional transfers/mobility and ADLs  * Cognitive retraining/development of therapeutic activities to improve problem solving, judgement, memory, and attention for increased safety/participation in ADL/IADL tasks  * Therapeutic exercise to improve motor endurance, ROM, and functional strength for ADLs/functional transfers  * Therapeutic activities to facilitate/challenge dynamic balance, stand tolerance for increased safety and independence with ADLs  * Therapeutic activities to facilitate gross/fine motor skills for increased independence with ADLs  * Positioning to improve skin integrity, interaction with environment and functional independence  * Delirium prevention/treatment  * Manual techniques for edema management        Recommended Adaptive Equipment: Shower chair, AE as needed for ADLs     Home Living: Pateint lives with a roommate in a 2 story house with 4+ landing+5 steps without railing to enter. Bedroom/bathroom are located on the 2nd floor with flight of stairs with railing. Bathroom setup: Tub only, standard commode   Equipment owned: supplemental O2     Prior Level of Function: Patient is a questionable historian. I/mod I with ADLs. I/mod I with most IADLs. Patient completed functional mobility using no device. Patient on 6 L O2 at baseline.   Driving: no  Occupation: Retired,         Pain Level: Patient reported no acute pain  Cognition: A&O: 3/4; Follows 1 step directions with continuous cues for re-direction to attend to task.   -Patient easily distracted and impulsive throughout session limiting ability to participate  - Patient easily agitated during session  - Patient demonstrates decreased safety awareness/poor insight              Memory:  Fair+              Sequencing:  Fair              Problem solving:  Fair-  Judgement/safety:  Fair-                Functional Assessment:  AM-PAC Daily Activity Raw Score: 15/24    Re-eval Status  Date: 8/10/21 Treatment Status  Date: STGs = LTGs  Time frame: 10-14 days   Feeding Set-up    Modified [place]    Grooming Set-up/SBA  Simulated seated   Modified [place]  Seated for safety    UB Dressing Minimal Assist   Doffed/donned hospital gown   Modified [place]    LB Dressing Minimal Assist  Doffed surgical shoe and doff/donned sock seated edge of bed, anticipate additional assistance required for safety with further LB dressing tasks     Modified [place]   Using AE as needed   Bathing Moderate Assist   Modified [place]   Using AE as needed   Toileting Moderate Assist   Completed tolieting using standard commode   Modified [place]    Bed Mobility  Supine to sit: NT  Sit to supine: Stand by Assist    Supine to sit: Modified [place]   Sit to supine: Modified [place]    Functional Transfers Sit<>stand: Contact Guard Assist   Various surfaces  Bed>bsc stand pivot: Min A using fww   Modified [place] using DME as needed   Functional Mobility Minimal Assist   Completed bsc>toilet>chair using fww, continuous verbal/tactile cues for safety, technique to maintain WBing status, pace, and pursed lip breathing, patient with poor safety awareness   Modified [place] walker  Functional household distance while maintaining WBing status   Balance Sitting:     Static: Mod I    Dynamic: SBA  Standing:CGA/min A       Activity Tolerance Fair   Fair+   Visual/  Perceptual Glasses: yes                       Hand Dominance: R handed    AROM (PROM) Strength Additional Info:    MAITE  [facility] 4/5   : 4/5 Fair+ FMC/dexterity noted during ADL tasks         NOAH [facility] 4/5   : 4/5 Fair+ FMC/dexterity noted during ADL tasks         Hearing: WFL   Sensation:  No acute c/o numbness or tingling   Tone: WFL   Edema: none observed B UE     Comments:  Upon arrival patient semi-supine in bed, agreeable to session with surgical shoe donned. At end of session, patient semi-supine in  bed with call light and phone within reach, all lines and tubes intact. Alarm on. Nursing notified. OT re-valuation performed with education provided regarding the purpose and benefits of OT session, along with mobility and I/ADL completion. OT re-evaluation indicated secondary to change in patient R LE WBing status per podiatry with goals modified to maintain patient precautions and safety. Overall, patient demonstrated decreased safety awareness/impulsivity, decreased attention to tasks, weakness, and impaired activity tolerance limiting independence and safety during completion of ADL/functional transfer/mobility tasks. Additionally, patient easily agitated with education and cues regarding WBing status limiting patient performance. Education provided regarding modified techniques for ADLs, along with functional mobility/transfers, to maximize patient safety and performance while maintaining WBing precautions. Patient verbalized and demonstrated fair- understanding. Will continue to reinforce. Patient would benefit from continued skilled OT to increase safety and independence with completion of ADL/IADL tasks for functional independence and improved quality of life.        Treatment: OT treatment provided this date includes:  ·  Instruction/training on safety and adapted techniques for completion of ADLs: Patient completed toileting using standard commode seated for safety for urination.  Education provided regarding modified techniques for completion of ADLs to maximize safety while maintaining precautions. Patient verbalized fair- understanding. Will continue to reinforce. ·  Instruction/training on safe functional mobility/transfer techniques: Therapist assessed and modified environment to maximize safety and patient performance. Education provided regarding technique for functional transfers/mbility while maintaining R LE 50% PWB in heel. CGA sit<>stand from various surfaces with verbal cues for hand placement and safety. Min A to complete bed>bsc stand pivot transfer using fww. Min A bsc>toilet>bed using fww with continuous verbal/tactile cues for safety, pace, technique to maintain precautions, and pursed lip breathing. Patient impulsive throughout transfers with patient self-initiating transfers/mobility without instructed to complete. Patient safety maintained. SBA sit>supine. ·  Skilled monitoring of vitals:  Skilled monitoring of the patient's response throughout treatment. ·  SpO2 during activity 88%<> 93% with cues for pursed lip breathing, SpO2 at end of session 92% , patient on 9 L O2     Rehab Potential: Good  for established goals     Patient / Family Goal: To return home       Patient and/or family were instructed on functional diagnosis, prognosis/goals and OT plan of care. Demonstrated fair understanding.     ·  Eval Complexity: Evaluation Complexity: Mod Complexity  ? History: Expanded review of medical records and additional review of physical, cognitive, or psychosocial history related to current functional performance  ? Exam: 5+ performance deficits  ? Assistance/Modification: mod/max assistance or modifications required to perform tasks.  May have comorbidities that affect occupational performance.        Time In: 10:00  Time out: 10:30  Total Treatment Time: 15 minutes    Min Units   OT Eval Low 03677       OT Eval Medium 94485      OT Eval High 38899       OT Re-Eval 53603  X  1   Therapeutic Ex 57794       Therapeutic Activities 62335  10  1   ADL/Self Care 63131  5  0

## 2021-08-10 NOTE — PROGRESS NOTES
[facility] Inpatient   Resident Progress Note    S:  Hospital day: 7    Brief Synopsis: Jacqueline Nicole is a 61 y.o. male with a PMH of HTN, COPD, Sleep apnea, Type 2 diabetes, CAD status post stent placement, Depression, Hyperlipidemia, Hepatitis C and GERD.     The patient presents to ED for Loss of Consciousness. He reports worsening shortness of breath for the past 3 months. Patient is on 6L oxygen at home that he states he wears consistently. Patient lost consciousness on 7/31/21 after walking around a store without oxygen. No seizures, no bladder incontinence.     CT pulmonary with contrast showed cardiomegaly and moderate bilateral pleural effusions with atelectasis, likely CHF vs pneumonia. Also 2-4 mm nonspecific pulmonary nodules. Patient desaturated down to 80% in the ED on 4 L and was placed on 15 L nonrebreather mask. Patient currently on NC 9 L O2, worse than yesterday. He is wearing BiPAP during the night. Echo 08/05: EF 45-50% (last EF 65% 2026), dilated RV and dilated aortic root.     Patient states he is feeling okay today but he did not sleep good tonight. He denies acute events during the night. He reports milt discomfort in his feet. Podiatry and wound care team are taking care of his right 4th digit laceration.     He is being followed by Dr. Marah Shen, Psychologist.     Patient examined at bedside on NC 7 L O2. Patient denies currently feeling short of breath. Patient maintains conversation without having to stop and catch his breath. Patient states he was able to eat and did not notice and difficulty breathing while eating. Patient denies headache, dizziness, and chest pain. He has trace bilateral edema until the mid shins, improving.     The patient has a right deep foot laceration. Normal foot X-ray. The wound was cleaned and sutured. Podiatry and Wound care team following.     Cont meds:    sodium chloride      dextrose      dextrose       Scheduled meds:    [START ON 8/11/2021] furosemide  40 mg Oral Daily    metoprolol succinate  50 mg Oral Daily    fluticasone  1 spray Each Nostril BID    insulin glargine  30 Units Subcutaneous BID    amLODIPine  10 mg Oral Daily    aspirin  81 mg Oral Daily    atorvastatin  80 mg Oral Nightly    budesonide  0.5 mg Nebulization BID    mirtazapine  30 mg Oral Nightly    pantoprazole  40 mg Oral QAM AC    sodium chloride flush  5-40 mL Intravenous 2 times per day    enoxaparin  40 mg Subcutaneous Daily    ipratropium-albuterol  1 ampule Inhalation Q4H WA    insulin lispro  11 Units Subcutaneous TID WC     PRN meds: technetium sestamibi, regadenoson, sodium chloride, melatonin, perflutren lipid microspheres, sodium chloride flush, sodium chloride, ondansetron **OR** ondansetron, polyethylene glycol, acetaminophen **OR** acetaminophen, hydrALAZINE, glucose, dextrose, glucagon (rDNA), dextrose, glucose, dextrose, glucagon (rDNA), dextrose     I reviewed the patient's Past Medical and Surgical History, Medications and Allergies. O:  VS: /76   Pulse 75   Temp 97.7 °F (36.5 °C) (Temporal)   Resp 20   Ht 6' (1.829 m)   Wt (!) 379 lb 1 oz (171.9 kg)   SpO2 94%   BMI 51.41 kg/m²     Physical Exam:  Physical Exam  Vitals reviewed. Constitutional:       General: He is not in acute distress. Appearance: Normal appearance. He is obese. He is not ill-appearing. HENT:      Head: Normocephalic and atraumatic. Cardiovascular:      Rate and Rhythm: Normal rate and regular rhythm. Pulses: Normal pulses. Heart sounds: Normal heart sounds. No murmur heard. Pulmonary:      Effort: Pulmonary effort is normal. No respiratory distress. Breath sounds: Normal breath sounds. Comments: NC 9 L O2  Abdominal:      General: Bowel sounds are normal.      Palpations: Abdomen is soft. Tenderness: There is no guarding or rebound. Musculoskeletal:         General: No swelling. Right lower leg: Edema present. Shortness of breath  Active Problems: Morbid obesity    Emphysema/COPD    Hypertension    Diabetes mellitus type 2 in obese (HCC)    CKD (chronic kidney disease) stage 3, GFR 30-59 ml/min ([facility])    Depression    CAD (coronary artery disease)    Pneumonia    Foot laceration    Hypoxia    Syncope and collapse    Abnormal echocardiogram    EKG, abnormal  Resolved Problems:    * No resolved hospital problems. *      1. Worsening shortness of breath 2/2 CHF vs COPD exacerbation  Patient has history of HFpEF, followed with Dr. Savanah Frias. Patient on 6 L NC at home, 4 L O2 in the car and uses BiPAP at night  Patient is currently on 9 L O2 NC  Unlikely pneumonia as patient is not complaining of any cough or sputum production, no fever, normal WBCs, and procal not significantly elevated  Pulmonary nodules on CT - Pulmonology does not consider that the nodules should be followed at this moment  Suspect CHF due to elevated ProBNP  · 40 mg IV Lasix 08/05. Patient is improving. · Strict Is and Os  · Pulmonology following - continue oxygen therapy and BiPAP, continue Pulmicort BID, Flonase and Duonebs  · Wean O2 as able  · Echo 08/05: EF 45-50% (last EF 65% in 2016), dilated RV and dilated aortic root  · CXR 08/06: Discrete vague ill-defined patchy ground-glass density or infiltrates in the mid lower aspect of both lungs with perihiliar vascular congestion  · Stress test 08/09 cancelled due to poor quality images  · Cardiology following - adjust medication before discharge                   2. ARLINE on CKD stage IIIb  Cr 2.1 mg/dL on admission, 1.8 mg/dL today (same as yesterday)  · FeNa 1.2% and FeUrea 48.1% suspecting an intrinsic renal disease  · Nephro consulted - Dr. Amarilis Mcgee  · Monitor Cr, BUN and GFR  · Hold HCTZ, Losartan, and Metformin   · Nephrology following                3.  HTN  127/77 (Goal < 130/80)  Atenolol may contribute to airway constriction. Patient would likely benefit from switching to metoprolol or carvedilol prior to discharge. · Continue atenolol and amlodipine   · Hold HCTZ and Losartan due to ARLINE.               4. Foot laceration  Large laceration located under the Right 4th toe  · Wound care consulted   · Wound was cleaned and sutured on 08/04  · Podiatry following                5. Type II diabetes   Home regimen: Novolog 20U daily, Novolin 70/30 55U BID  HbA1c (08/03): 6.3  Glucose today 139 (Goal 140-180)  · Continue Lantus 30 U BID and Humalog 11 U with meals (pharmacy replacement for patient's home regimen)  · Hold metformin due to ARLINE     6. HLD  · Continue Lipitor     7. GERD  · Continue Protonix      8. Depression  Patient's major motivating factor is his dog  · Continue Trazodone and Remeron  · Hold Gabapentin  · Psychologist, Dr. David Moreira, following     9. Hx of Hep C infection  · Awaiting for hep C viral load     10. Hx of cocaine abuse  · Urine Drug test: positive for fentanyl  · Patient denies any recent drug abuse. Endorses a past history of IV cocaine abuse and abuse of various other drugs.  Denies ever using heroin.        DVT Prophylaxis: Lovenox  GI Prophylaxis: Protonix  Diet: Adult regular  PT/OT following  Wound care and Podiatry following  SW following - He wants to be discharged home      Electronically signed by Monica Young MD on 8/10/2021 at 4:19 PM  This case was discussed with attending physician Dr. Pippa Moulton

## 2021-08-10 NOTE — PROGRESS NOTES
Progress Note  NEPHROLOGY    Reason for Consult: Evaluation of acute on chronic kidney disease    From 8/4/21 Consult note - History of Present Ilness: This is a 61 y.o.  male with a H/O CKD stage III followed by Dr. Peggy Schumacher of our practice who now presents to ED on 8/2/2021 with worsening fatigue, shortness of breath and syncope. Admission labs included Pro-BNP 4,152, Procalcitonin 0.10, sodium 142, potassium 5.0, CO2 26, creatinine 2.1, lactic acid 4.1, troponin 24, WBC 10.6. CT of head without contrast showed no skull fracture or acute intracranial abnormality. CTA of chest with contrast showed no central pulmonary embolism or aortic dissection. Chest x-ray showed cardiomegaly with moderate pleural effusion with atelectasis likely CHF and/or pneumonia. 2 -4 mm nonspecific pulmonary nodules. Patient was subsequently admitted for further evaluation and work-up. Interval History:   8/6/21:Sitting on edge of bed, still sob with activity. 8/7: pt seen in room, weak, no cp  8/8: pt seen in room, no sob today  8/9/21- awake and alert. Stress testing cancelled this am  8/10/21- awake and alert. He is feeling well this am.     Past Medical History:        Diagnosis Date    Anxiety     Asthma     CAD (coronary artery disease) 2008    2 stents in 2008 .  CHF (congestive heart failure) (HCC)     Chills     Chronic sinusitis     Cocaine abuse ([address]     COPD (chronic obstructive pulmonary disease) (HCC)     Depression     Diabetes mellitus (HCC)     on insulin    GERD (gastroesophageal reflux disease)     H/O cardiovascular stress test     Hand fracture 3/2010    Fractured both hands.  Hepatitis C     Work up was negative. Saw specialist. Negative viral load.  History of tobacco abuse     Hyperlipidemia     Hypertension     Night sweats     NSTEMI (non-ST elevation myocardial infarction) ([address] 3/2010    Hospitalized.      Obesity     Oxygen dependent     Panic attacks     Pneumonia 3/2010     LLL.  Sleep apnea     Has CPAP machine    SOB (shortness of breath)     does not know settings       Past Surgical History:        Procedure Laterality Date    COLONOSCOPY  2011    COLONOSCOPY  9/23/15   [facility] CATH LAB PROCEDURE  2/25/2009    [facility], cardiac cath/primary BM stent in proximal LAD.     OTHER SURGICAL HISTORY      anal fistula    TONSILLECTOMY         Current Medications:    Current Facility-Administered Medications: technetium sestamibi (CARDIOLITE) injection 35 millicurie, 35 millicurie, Intravenous, ONCE PRN  regadenoson (LEXISCAN) injection 0.4 mg, 0.4 mg, Intravenous, ONCE PRN  sodium chloride (OCEAN, BABY AYR) 0.65 % nasal spray 1 spray, 1 spray, Each Nostril, Q4H PRN  fluticasone (FLONASE) 50 MCG/ACT nasal spray 1 spray, 1 spray, Each Nostril, BID  melatonin tablet 3 mg, 3 mg, Oral, Nightly PRN  furosemide (LASIX) injection 40 mg, 40 mg, Intravenous, BID  insulin glargine (LANTUS) injection vial 30 Units, 30 Units, Subcutaneous, BID  perflutren lipid microspheres (DEFINITY) injection 1.65 mg, 1.5 mL, Intravenous, ONCE PRN  amLODIPine (NORVASC) tablet 10 mg, 10 mg, Oral, Daily  aspirin EC tablet 81 mg, 81 mg, Oral, Daily  atenolol (TENORMIN) tablet 50 mg, 50 mg, Oral, Daily  atorvastatin (LIPITOR) tablet 80 mg, 80 mg, Oral, Nightly  budesonide (PULMICORT) nebulizer suspension 500 mcg, 0.5 mg, Nebulization, BID  mirtazapine (REMERON) tablet 30 mg, 30 mg, Oral, Nightly  pantoprazole (PROTONIX) tablet 40 mg, 40 mg, Oral, QAM AC  sodium chloride flush 0.9 % injection 5-40 mL, 5-40 mL, Intravenous, 2 times per day  sodium chloride flush 0.9 % injection 5-40 mL, 5-40 mL, Intravenous, PRN  0.9 % sodium chloride infusion, 25 mL, Intravenous, PRN  enoxaparin (LOVENOX) injection 40 mg, 40 mg, Subcutaneous, Daily  ondansetron (ZOFRAN-ODT) disintegrating tablet 4 mg, 4 mg, Oral, Q8H PRN **OR** ondansetron (ZOFRAN) injection 4 mg, 4 mg, Intravenous, Q6H PRN  polyethylene glycol (GLYCOLAX) packet 17 g, 17 g, Oral, Daily PRN  acetaminophen (TYLENOL) tablet 650 mg, 650 mg, Oral, Q6H PRN **OR** acetaminophen (TYLENOL) suppository 650 mg, 650 mg, Rectal, Q6H PRN  ipratropium-albuterol (DUONEB) nebulizer solution 1 ampule, 1 ampule, Inhalation, Q4H WA  hydrALAZINE (APRESOLINE) tablet 25 mg, 25 mg, Oral, Q8H PRN  glucose (GLUTOSE) 40 % oral gel 15 g, 15 g, Oral, PRN  dextrose 50 % IV solution, 12.5 g, Intravenous, PRN  glucagon (rDNA) injection 1 mg, 1 mg, Intramuscular, PRN  dextrose 5 % solution, 100 mL/hr, Intravenous, PRN  insulin lispro (HUMALOG) injection vial 11 Units, 11 Units, Subcutaneous, TID WC  glucose (GLUTOSE) 40 % oral gel 15 g, 15 g, Oral, PRN  dextrose 50 % IV solution, 12.5 g, Intravenous, PRN  glucagon (rDNA) injection 1 mg, 1 mg, Intramuscular, PRN  dextrose 5 % solution, 100 mL/hr, Intravenous, PRN    Allergies:  Amoxicillin and Penicillins    Social History:      reports that he quit smoking about 13 years ago. His smoking use included cigarettes. He started smoking about 41 years ago. He has a 76.00 pack-year smoking history. He has never used smokeless tobacco. He reports that he does not drink alcohol and does not use drugs. Family History:     Family History   Problem Relation Age of Onset    Heart Failure Father     Diabetes Mother     Heart Surgery Brother          Review of Systems:       Pertinent positives stated above in HPI. All other systems were reviewed and were negative.     Physical exam:   Constitutional:  VITALS:  BP (!) 140/81   Pulse 75   Temp 96.6 °F (35.9 °C) (Temporal)   Resp 24   Ht 6' (1.829 m)   Wt (!) 379 lb 1 oz (171.9 kg)   SpO2 94%   BMI 51.41 kg/m²   CURRENT TEMPERATURE:  Temp: 96.6 °F (35.9 °C)  CURRENT RESPIRATORY RATE:  Resp: 24  CURRENT PULSE:  Pulse: 75  CURRENT BLOOD PRESSURE:  BP: (!) 140/81  24HR BLOOD PRESSURE RANGE:  Systolic (58DIH), BVW:044 , Min:127 , TZU:549   ; Diastolic (90WVQ), JPT:38, administration of intravenous contrast. Dose modulation, iterative reconstruction, and/or weight based adjustment of the mA/kV was utilized to reduce the radiation dose to as low as reasonably achievable. COMPARISON: None. HISTORY: ORDERING SYSTEM PROVIDED HISTORY: LOC TECHNOLOGIST PROVIDED HISTORY: Has a \"code stroke\" or \"stroke alert\" been called? ->No Reason for exam:->LOC Decision Support Exception - unselect if not a suspected or confirmed emergency medical condition->Emergency Medical Condition (MA) What reading provider will be dictating this exam?->CRC FINDINGS: BRAIN/VENTRICLES: No mass effect, edema or hemorrhage is seen. Mild volume loss is seen in the cerebrum with mild chronic microvascular ischemic changes. No hydrocephalus or extra-axial fluid is seen. ORBITS: The visualized portion of the orbits demonstrate no acute abnormality. SINUSES:  A mucous retention cyst is seen in the left maxillary sinus. Mild mucosal thickening in the ethmoid sinuses. The remainder of the visualized paranasal sinuses and the mastoids are grossly clear. SOFT TISSUES/SKULL:  No acute abnormality of the visualized skull or soft tissues. No skull fracture or acute intracranial abnormality. CTA PULMONARY W CONTRAST    Result Date: 8/2/2021  EXAMINATION: CTA OF THE CHEST 8/2/2021 5:23 pm TECHNIQUE: CTA of the chest was performed after the administration of intravenous contrast.  Multiplanar reformatted images are provided for review. MIP images are provided for review. Dose modulation, iterative reconstruction, and/or weight based adjustment of the mA/kV was utilized to reduce the radiation dose to as low as reasonably achievable. COMPARISON: Previous examination on the same day 3 of is prior.  HISTORY: ORDERING SYSTEM PROVIDED HISTORY: hypoxic TECHNOLOGIST PROVIDED HISTORY: Reason for exam:->hypoxic Decision Support Exception - unselect if not a suspected or confirmed emergency medical condition->Emergency Medical examination as described. There are small bilateral pleural effusions with bibasilar airspace opacities, most in keeping with compressive atelectasis. Several 2-4 mm right-sided pulmonary nodules are felt to be stable. New mediastinal lymphadenopathy, of uncertain etiology or clinical significance. Given these findings, correlation with diagnostic CT of the chest is recommended chest. LUNG RADS: Per ACR Lung-RADS Version 1.1 Category 2S, Benign appearance or behavior. Management:  Continue annual lung screening with LDCT in 12 months. RECOMMENDATIONS: If you would like to register your patient with the [place] [id number], please contact the Nurse Navigator at [phone]. Assessment/Plan    1. Acute on CKD Stage 3B  Baseline over the past 2 years 1.3-1.5  Admitting serum creatinine 2.1  HCTZ and ARB held upon admission  No aggressive work-up planned as this patient is well-known to our practice  Follow daily labs and strict intake and output  Avoid NSAIDs  8/6 Cr 2.1>1.7->1.8  Follow  closely with diuresis    2. Shortness of breath  Acute on chronic respiratory failure  Followed by the pulmonary team  8/5 Lasix increased to 40 mg IV BID  UO last 24 hrs ? Not recorded    3. Hypertension with CKD stage 1-4  Last recorded echo showed EF of 65% in March 2016: For repeat  BP at/near goal< 130/80 on current meds    4. Type II DM  Care per the primary team    5.   Chronic kidney disease stage IIIb  Based on history of hypertension and diabetes  Baseline over the past 2 years 1.3-1.5    OK for discharge from renal's standpoint    JENELLE Mo CNP  8/10/2021  10:37 AM   Pt seen and examined agree with above  Stop iv diruetics  Start lasix 40 mg po qd  Rosalia Bolivar MD

## 2021-08-10 NOTE — PROGRESS NOTES
Pt educated on dressing change. Understood information but states he won't be compliant. Attempted walking pulseox several times, pt refused because he didn't have a walker. States that he has oxygen concentrator at home because he can't live without it. Discharging doctor verified that oxygen supply was not needed for patient because he already had proper equipment for his current o2 demand.

## 2021-08-10 NOTE — PROGRESS NOTES
Inpatient Cardiology Progress note     PATIENT IS BEING FOLLOWED FOR:    Emir Mabry is a 61 y.o. super morbidly obese  male known to Dr Crocker Loss: Complains of SOB with ambulation but no CP  OBJECTIVE: Tachypneic at rest while sitting at side of bed eating lunch. ROS:  Consist: Denies fevers, chills or night sweats  Heart: Denies chest pain, palpitations, lightheadedness, dizziness or syncope  Lungs: + ALONSO. + orthopnea/PND. Denies cough or wheezing. GI: Denies abdominal pain, vomiting or diarrhea    PHYSICAL EXAM:   BP (!) 140/81   Pulse 75   Temp 96.6 °F (35.9 °C) (Temporal)   Resp 24   Ht 6' (1.829 m)   Wt (!) 379 lb 1 oz (171.9 kg)   SpO2 94%   BMI 51.41 kg/m²    B/P Range last 24 hours: Systolic (37JTN), GAF:009 , Min:127 , OOR:582    Diastolic (93SZL), YOR:34, Min:76, Max:81    CONST: Well developed, super morbid obese  male who appears stated age. Awake, alert and cooperative. No apparent distress  HEENT:   Head- Normocephalic, atraumatic   Eyes- Conjunctivae pink, anicteric  Throat- Oral mucosa pink and moist  Neck-  No stridor, trachea midline, no jugular venous distention. No carotid bruit  CHEST: Chest symmetrical and non-tender to palpation. No accessory muscle use or intercostal retractions  RESPIRATORY:  Lung sounds - diminished in the bases, on NC O2  CARDIOVASCULAR:     Heart Ausculation- Regular rate and rhythm, no murmur heard. PV: No lower extremity edema. No varicosities. Pedal pulses palpable, no clubbing or cyanosis   ABDOMEN: Soft, obese, non-tender to light palpation. Bowel sounds present. No palpable masses no organomegaly; no abdominal bruit  MS: Good muscle strength and tone. No atrophy or abnormal movements. : Deferred  SKIN: Warm and dry no statis dermatitis or ulcers   NEURO / PSYCH: Oriented to person, place and time. Speech clear and appropriate. Follows all commands.  Pleasant affect       Intake/Output Summary (Last 24 hours) at 8/10/2021 1356  Last data filed at 8/10/2021 1244  Gross per 24 hour   Intake 480 ml   Output --   Net 480 ml       Weight:   Wt Readings from Last 3 Encounters:   08/08/21 (!) 379 lb 1 oz (171.9 kg)   06/04/21 (!) 380 lb (172.4 kg)   04/13/21 (!) 383 lb (173.7 kg)     Current Inpatient Medications:   [START ON 8/11/2021] furosemide  40 mg Oral Daily    fluticasone  1 spray Each Nostril BID    insulin glargine  30 Units Subcutaneous BID    amLODIPine  10 mg Oral Daily    aspirin  81 mg Oral Daily    atenolol  50 mg Oral Daily    atorvastatin  80 mg Oral Nightly    budesonide  0.5 mg Nebulization BID    mirtazapine  30 mg Oral Nightly    pantoprazole  40 mg Oral QAM AC    sodium chloride flush  5-40 mL Intravenous 2 times per day    enoxaparin  40 mg Subcutaneous Daily    ipratropium-albuterol  1 ampule Inhalation Q4H WA    insulin lispro  11 Units Subcutaneous TID WC       IV Infusions (if any):   sodium chloride      dextrose      dextrose         DIAGNOSTIC/ LABORATORY DATA:  Labs:   CBC:   Recent Labs     08/09/21  0726 08/10/21  0704   WBC 10.2 10.5   HGB 14.2 14.0   HCT 47.5 46.9    261     BMP:   Recent Labs     08/09/21  0726 08/10/21  0704    142   K 4.2 3.9   CO2 31* 29   BUN 30* 31*   CREATININE 1.8* 1.8*   LABGLOM 39 39   CALCIUM 9.3 9.2     Phos:   Recent Labs     08/10/21  0704   PHOS 4.4     TFT:   Lab Results   Component Value Date    TSH 1.930 01/09/2020      HgA1c:   Lab Results   Component Value Date    LABA1C 6.3 (H) 08/03/2021     FASTING LIPID PANEL:  Lab Results   Component Value Date    CHOL 161 01/09/2020    HDL 39 01/09/2020    LDLCALC 49 01/09/2020    TRIG 367 01/09/2020 8/2/2021 CTA Pulmonary W: No central pulmonary embolism or aortic dissection. The distal subsegmental and peripheral vessels are poorly evaluated due to poor contrast. Cardiomegaly with moderate pleural effusion with atelectasis likely CHF and or pneumonia. 2-4 mm nonspecific pulmonary nodules.     8/6/2021 2 View CXR: Development of a discrete vague ill-defined patchy ground-glass density or infiltrates in the mid lower aspect of both lungs with some signs of a perihilar vascular congestion. Cannot exclude underline bilateral pneumonia    Telemetry: SR with PVC's    1/2016 Lexiscan MPS: EF 42%. Findings consistent with infarction involving the mid and distal segments of the inferior wall. Inferior wall hypokinesis. 8/5/2021 TTE Dr Alejandro Gill: Left ventricle is normal in size. EF visually estimated at 45-50%. Overall ejection fraction mildly decreased. Mid to distal anterolateral and apical walls are hypokinetic. Normal left ventricle wall thickness. Indeterminate diastolic function. Moderately dilated RV. Right ventricle global systolic function is mildly reduced . TAPSE 15 mm. No hemodynamically significant aortic stenosis is present. Mildly dilated aortic root (3.7 cm). No evidence for hemodynamically significant pericardial effusion. Technically difficult examination. Definity echo contrast was used to delineate endocardial borders. 8/9/2021 Lexiscan MPS: Per Dr Villa-->Asked to review resting nuclear images --> un interpretable -->  stress test cancelled    ASSESSMENT:   Cardiomyopathy with mild systolic dysfunction (EF 86-25%). No reported CP  Mildly elevated troponin in context of ARLINE, respiratory failure and HF  Chronic hypoxic hypercapnic respiratory failure  Chronic O2 3-6 liters  PHTN WHO group 2  HTN  ALEXIS complaint with Bi-pap  Acute on Chronic HFmEF    CKD (SCr 1.4 in 1/2021) SCr 2.1-->1.7-->1.8 (ACEI/HCTZ held on admission)  BMI 51.4 Super morbid obesity  Hx polysubstance abuse. UDS + fentanyl  T2DM insulin requiring HgbA1c 6.3 on 8/3/2021  Pulmonary nodules--> surveillance       PLAN:  Medical management at this time  Change Atenolol to Toprol  Continue Norvasc, ASA.   Diuretic as per nephrology recommendations  Weight loss  To follow up with Dr Juan Powell in 3-4 weeks, office will call with follow up appointment    Discussed with Dr Guillermina Allen  Electronically signed by Hardik Gallardo.  YOLANDA Alvarez on 8/10/2021 at 1:56 PM

## 2021-08-10 NOTE — CARE COORDINATION
Chart reviewed, messaged attending regarding NM stress test that was cancelled, and to update that pt is okay for discharge from nephrology's POV, awaiting response via perfect-serve.

## 2021-08-10 NOTE — PROGRESS NOTES
Juan Manuel Roberts M.D.,[facility]  Nadine Son, DBaO., FHENRI., Balwinder Pitts M.D. Bailey Birmingham M.D. Magda Moran D.O. Daily Pulmonary Progress Note    Patient:  Hortencia Galeas 61 y.o. male MRN: [id number]     Date of Service: 8/10/2021      Synopsis     We are following patient for  COPD, home oxygen     \"CC\" SOB    Code status: FULL      Subjective      Patient was seen and examined. oxygen 9 L HFNC decreased to 7 liters. Patient states he wore his Bipap last night will wean FIO2 60%. Tolerating diuresis IV lasix per nephrology. Need accurate I/O documented. Review of Systems:  Constitutional: Denies fever, weight loss, night sweats, and fatigue  Skin: Denies pigmentation, dark lesions, and rashes   HEENT: Denies hearing loss, tinnitus, ear drainage, epistaxis, sore throat, and hoarseness. Cardiovascular: Denies palpitations, chest pain, and chest pressure. Respiratory: Denies cough, dyspnea at rest, hemoptysis, apnea, and choking.   Gastrointestinal: Denies nausea, vomiting, poor appetite, diarrhea, heartburn or reflux  Genitourinary: Denies dysuria, frequency, urgency or hematuria  Musculoskeletal: Denies myalgias, muscle weakness, and bone pain  Neurological: Denies dizziness, vertigo, headache, and focal weakness  Psychological: Denies anxiety and depression  Endocrine: Denies heat intolerance and cold intolerance  Hematopoietic/Lymphatic: Denies bleeding problems and blood transfusions    24-hour events:  None     Objective   Vitals: BP (!) 140/81   Pulse 75   Temp 96.6 °F (35.9 °C) (Temporal)   Resp 24   Ht 6' (1.829 m)   Wt (!) 379 lb 1 oz (171.9 kg)   SpO2 94%   BMI 51.41 kg/m²     I/O:    Intake/Output Summary (Last 24 hours) at 8/10/2021 1158  Last data filed at 8/10/2021 0933  Gross per 24 hour   Intake 360 ml   Output --   Net 360 ml       Vent Information  Skin Assessment: Clean, dry, & intact  FiO2 : 80 %  SpO2: 94 %  SpO2/FiO2 ratio: 123.75  I Time/ I Time %: 0.9 s  Mask Type: Full face mask  Mask Size: Medium       IPAP: 15 cmH20  CPAP/EPAP: 10 cmH2O     CURRENT MEDS :  Scheduled Meds:   fluticasone  1 spray Each Nostril BID    furosemide  40 mg Intravenous BID    insulin glargine  30 Units Subcutaneous BID    amLODIPine  10 mg Oral Daily    aspirin  81 mg Oral Daily    atenolol  50 mg Oral Daily    atorvastatin  80 mg Oral Nightly    budesonide  0.5 mg Nebulization BID    mirtazapine  30 mg Oral Nightly    pantoprazole  40 mg Oral QAM AC    sodium chloride flush  5-40 mL Intravenous 2 times per day    enoxaparin  40 mg Subcutaneous Daily    ipratropium-albuterol  1 ampule Inhalation Q4H WA    insulin lispro  11 Units Subcutaneous TID WC       Physical Exam:  General Appearance: appears comfortable in no acute distress. Obese  HEENT: Normocephalic atraumatic without obvious abnormality   Neck: Lips, mucosa, and tongue normal.  Supple, symmetrical, trachea midline, no adenopathy;thyroid:  no enlargement/tenderness/nodules or JVD. Lung: Breath sounds, CTA. Respirations unlabored. Symmetrical expansion. Heart: RRR, normal S1, S2. No MRG  Abdomen: Soft, NT, ND. BS present x 4 quadrants. No bruit or organomegaly. Rotund  Extremities: Pedal pulses 2+ symmetric b/l. Extremities normal, no cyanosis, clubbing. Laceration under right 2nd toe  Musculokeletal: No joint swelling, no muscle tenderness. ROM normal in all joints of extremities. Neurologic: Mental status: Alert and Oriented X3 . Pertinent/ New Labs and Imaging Studies     Imaging Personally Reviewed:  CTA pulmonary W contrast 8/2  The examination is technically limited due to suboptimal contrast   opacification and patient body habitus. Given this limitation, there are no   filling defects in the main pulmonary artery and the central branches. The   distal subsegmental and peripheral vessels are poorly opacified and cannot be   evaluated for distal subsegmental pulmonary embolism.   There is cardiomegaly. Moderately enlarged mediastinal lymph nodes are identified with lymph nodes   measuring up to 1.3 cm. Trachea and major bronchi are patent. There is   atelectasis and moderate pleural effusion in the lower lobes with   ground-glass opacities in the upper lobes. 2-4 mm nonspecific pulmonary   nodules are identified in the right upper lobe and right lower lobe without   change. The liver is fatty infiltrated. Degenerative changes are identified   in the thoracic spine       CXR 8/6  Development of mild perihilar vascular congestion with discrete patchy   parenchymal opacity in the mid lower aspect of both lungs which can represent   discrete bi basilar infiltrates. Findings have to be correlated clinically,   can be relate with volume overload but other possibilities including   pneumonia cannot be excluded. Minimal pleural reaction is seen in the   costophrenic sulcus. Delete       Heart has upper borderline size. The mediastinum demonstrate mild to   moderate ectasia of the thoracic aorta. ECHO 8/5/21   Left ventricle is normal in size . Ejection fraction is visually estimated at 45-50%. Overall ejection fraction mildly decreased . Mid to distal anterolateral and apical walls are hypokinetic. Normal left ventricle wall thickness. Indeterminate diastolic function. Moderately dilated right ventricle. Right ventricle global systolic function is mildly reduced . TAPSE 15 mm. No hemodynamically significant aortic stenosis is present. Mildly dilated aortic root (3.7 cm). No evidence for hemodynamically significant pericardial effusion. Technically difficult examination. Definity echo contrast was used to   delineate endocardial borders.       Signature      ----------------------------------------------------------------   Electronically signed by Lalito Guthrie MD(Interpreting   physician) on 08/05/2021 08:32 PM      Labs:  Lab Results   Component Value Date    WBC 10.5 08/10/2021    HGB 14.0 08/10/2021    HCT 46.9 08/10/2021    MCV 89.0 08/10/2021    MCH 26.6 08/10/2021    MCHC 29.9 08/10/2021    RDW 14.8 08/10/2021     08/10/2021    MPV 11.5 08/10/2021     Lab Results   Component Value Date     08/10/2021    K 3.9 08/10/2021     08/10/2021    CO2 29 08/10/2021    BUN 31 08/10/2021    CREATININE 1.8 08/10/2021    LABALBU 3.6 08/02/2021    LABALBU 4.2 09/20/2011    CALCIUM 9.2 08/10/2021    GFRAA 47 08/10/2021    LABGLOM 39 08/10/2021     Lab Results   Component Value Date    PROTIME 13.0 02/22/2014    INR 1.2 02/22/2014     No results for input(s): PROBNP in the last 72 hours. No results for input(s): PROCAL in the last 72 hours. This SmartLink has not been configured with any valid records. Micro:  Respiratory panel negative 8/2     Assessment:    1. Acute on chronic respiratory failure with hypoxia and hypercapnia  2. Syncopal episode  3. Obesity hypoventilation syndrome   4. Restrictive ventilatory pattern, last PFTs 2014  5. Pulmonary hypertension WHO group 2  6. Small bilateral pleural effusions on CTA  7. Heart failure with preserved EF  8. ALEXIS treated with BiPAP, Chronic home O2 3-6L NC  9. COPD/emphysema  10. Morbid obesity BMI 48  11. Nicotine dependence in remission  12. History of polysubstance abuse. Sober since 1993  13. Pulmonary nodule 2-4mm stable      Plan:   1. Oxygen therapy, 7L HFNC, titrate to keep SpO2 >87%  2. Will need oxygen levels checked ambulating on 6 liters (pt baseline) prior to dc  3. Bipap HS and PRN at home settings of 15/10, FiO2 decrease to 60%. Titrate FiO2 to keep SpO2 >87%  4. Recruitment techniques, incentive spirometer  5. Continue Pulmicort BID and Duonebs Q4H WA  6. Stress test unable to be performed d/t poor image quality 8/9/21  7. Lasix 40 mg IV BID per nephrology. Strict I/O  8. Dietary evaluation reviewed. BMI 48  9. GI/DVT prophylaxis  10.  Per Fleischner guidelines, no more follow-ups required for

## 2021-08-12 NOTE — TELEPHONE ENCOUNTER
Last Appointment:  6/4/2021  Future Appointments   Date Time Provider Alex Martita   8/18/2021  3:20 PM DO Jez Hendrix [facility]   8/23/2021  2:00 PM [facility]   9/7/2021  1:30 PM MD Sandra Sam [facility]    Patient requesting Trazodone refill, last  Note in chart states that he has to stop taking at Discharge 7/9/2021, please advise.

## 2021-08-13 NOTE — TELEPHONE ENCOUNTER
Marshall Weston called in and is asking now if you could please order him a rollator.  He mistakenly declined it while in the hospital.    Thank you

## 2021-08-16 NOTE — TELEPHONE ENCOUNTER
Last Appointment:  6/4/2021  Future Appointments   Date Time Provider Alex Singhi   8/18/2021  3:20 PM DO Brigitte Martínez [facility]   8/23/2021  2:00 PM JENELLE Nava CNP [facility]   8/30/2021  2:30 PM JENELLE Quijano CNP [facility]   9/7/2021  1:30 PM Lucrecia Aragon MD [address] [place] [address]

## 2021-08-18 NOTE — PROGRESS NOTES
S: 61 y.o. male here for TCM f/u of hypoxia 2/2 not using his home O2 vs copd vs CHF. EF decreased. Metoprolol, lasix,   Elevated Cr. Metformin held for LA. DM. On insulin. Trazodone held in hospital due to prolonged QT. Pt has h/o insomnia and depression. remeron at max dose not adequate per pt.     O: VS: /84 (Site: Right Upper Arm, Position: Sitting, Cuff Size: Medium Adult)   Pulse 100   Temp 97.6 °F (36.4 °C) (Temporal)   Resp 26   Ht 6' (1.829 m)   Wt (!) 375 lb (170.1 kg)   SpO2 92%   BMI 50.86 kg/m²    General: NAD, alert and interacting appropriately. CV:  RRR, no gallops, rubs, or murmurs. Resp: CTAB   Abd:  Soft, nontender   Ext:  No edema. R fourth toe lac healing well       Impression: TCM CHF exac > copd  Plan:   F/u w/ Cards  CMP  Refill trazodone at half dose (200 mg). Continue remeron and gabapentin for now, encourage taper. Stay off metformin and hctz. Can continue holding losartan for now. Take amlodipine for HTN. rtc 1 mo for HFrEF    Attending Physician Statement  I have discussed the case, including pertinent history and exam findings with the resident. I also have seen the patient and performed key portions of the examination. I agree with the documented assessment and plan.

## 2021-08-18 NOTE — PROGRESS NOTES
[facility]  FAMILY MEDICINE RESIDENCY PROGRAM  DATE OF VISIT : 2021    Patient : Garret Blue   Age : 61 y.o.  : 1960   MRN : [id number]     Post-Discharge Transitional Care Management Services or Hospital Follow Up    Garret Blue   YOB: 1960    Date of Office Visit:  2021  Date of Hospital Admission: 21  Date of Hospital Discharge: 8/10/21  Risk of hospital readmission (high >=14%.  Medium >=10%) :Readmission Risk Score: 20    Care management risk score Rising risk (score 2-5) and Complex Care (Scores >=6): 7     Non face to face  following discharge, date last encounter closed (first attempt may have been earlier): 2021 12:59 PM    Call initiated 2 business days of discharge: Yes    Patient Active Problem List   Diagnosis    Morbid obesity    Emphysema/COPD    Hypertension    Hyperlipidemia    Acid reflux    Sleep apnea    Diabetes mellitus type 2 in obese ([address]    History of non-ST elevation myocardial infarction (NSTEMI)    CKD (chronic kidney disease) stage 3, GFR 30-59 ml/min ([facility])    Positive hepatitis C antibody test    BPH (benign prostatic hyperplasia)    Depression    Polypharmacy    CAD (coronary artery disease)    Other insomnia    Foot laceration    Hypoxia    Abnormal echocardiogram       Allergies   Allergen Reactions    Amoxicillin Hives and Shortness Of Breath    Penicillins Hives and Shortness Of Breath     TRIAMOX       Medications listed as ordered at the time of discharge from hospital   [facility] Medication Instructions [facility]:[id number]    Printed on:21 2280   Medication Information                      albuterol (PROVENTIL) (2.5 MG/3ML) 0.083% nebulizer solution  Take 3 mLs by nebulization 4 times daily inhale contents of 1 vial in nebulizer four times a day             aspirin 81 MG EC tablet  Take 1 tablet by mouth daily             atorvastatin (LIPITOR) 80 MG tablet  take 1 tablet by mouth once daily             B-D INS SYR ULTRAFINE 1CC/30G 30G X 1/2\" 1 ML MISC  1 each by Does not apply route 3 times daily             beclomethasone (QVAR) 80 MCG/ACT inhaler  Inhale 2 puffs into the lungs 2 times daily             BiPAP Machine MISC  by Does not apply route nightly             blood glucose monitor strips  Test 3 times a day & as needed for symptoms of irregular blood glucose. Dispense sufficient amount for indicated testing frequency plus additional to accommodate PRN testing needs. Blood Glucose Monitoring Suppl (ONE TOUCH ULTRA 2) w/Device KIT  1 kit by Does not apply route daily             blood glucose test strips (ASCENSIA AUTODISC VI;ONE TOUCH ULTRA TEST VI) strip  1 each by Does not apply route 3 times daily             docusate sodium (DOCQLACE) 100 MG capsule  Take 1 capsule by mouth 2 times daily             finasteride (PROSCAR) 5 MG tablet  Take 1 tablet by mouth daily             furosemide (LASIX) 40 MG tablet  Take 1 tablet by mouth daily             gabapentin (NEURONTIN) 800 MG tablet  take 1 tablet by mouth NIGHTLY             glucose monitoring kit (FREESTYLE) monitoring kit  1 kit by Does not apply route daily             Handicap Placard MISC  by Does not apply route             hydrALAZINE (APRESOLINE) 10 MG tablet  Take 1 tablet by mouth 3 times daily             insulin 70-30 (NOVOLIN 70/30) (70-30) 100 UNIT per ML injection vial  Inject 55 Units into the skin 2 times daily             insulin aspart (NOVOLOG PENFILL) 100 UNIT/ML injection cartridge  Inject 20 Units into the skin daily             Insulin Syringes, Disposable, U-100 1 ML MISC  1 each by Does not apply route daily             Ipratropium-Albuterol (COMBIVENT IN)  Inhale 2 puffs into the lungs 2 times daily              Lancets MISC  Check sugars as directed.  [id number] Monie Nguyen for pharmacy to substitute             metoprolol succinate (TOPROL XL) 50 MG extended release tablet  Take 1 tablet by mouth daily             mirtazapine (REMERON) 30 MG tablet  take 1 tablet by mouth every evening             Misc. Devices (BARIATRIC ROLLATOR) MISC  1 Device by Does not apply route daily             omeprazole (PRILOSEC) 20 MG delayed release capsule  Take 1 capsule by mouth daily             OXYGEN  Inhale 6 L into the lungs continuous              promethazine (PHENERGAN) 25 MG tablet  Take 1 tablet by mouth every 6 hours as needed for Nausea             tamsulosin (FLOMAX) 0.4 MG capsule  take 1 capsule by mouth once daily             traZODone (DESYREL) 100 MG tablet  Take 2 tablets by mouth nightly                   Medications marked \"taking\" at this time  Outpatient Medications Marked as Taking for the 8/18/21 encounter (Office Visit) with Mattyjuaniprakash Engelcarla, DO   Medication Sig Dispense Refill    traZODone (DESYREL) 100 MG tablet Take 2 tablets by mouth nightly 60 tablet 0    gabapentin (NEURONTIN) 800 MG tablet take 1 tablet by mouth NIGHTLY 30 tablet 1    furosemide (LASIX) 40 MG tablet Take 1 tablet by mouth daily 60 tablet 3    metoprolol succinate (TOPROL XL) 50 MG extended release tablet Take 1 tablet by mouth daily 30 tablet 3    blood glucose monitor strips Test 3 times a day & as needed for symptoms of irregular blood glucose. Dispense sufficient amount for indicated testing frequency plus additional to accommodate PRN testing needs.  300 strip 1    albuterol (PROVENTIL) (2.5 MG/3ML) 0.083% nebulizer solution Take 3 mLs by nebulization 4 times daily inhale contents of 1 vial in nebulizer four times a day 1080 mL 1    insulin aspart (NOVOLOG PENFILL) 100 UNIT/ML injection cartridge Inject 20 Units into the skin daily 5 Cartridge 3    insulin 70-30 (NOVOLIN 70/30) (70-30) 100 UNIT per ML injection vial Inject 55 Units into the skin 2 times daily 8 vial 3    atorvastatin (LIPITOR) 80 MG tablet take 1 tablet by mouth once daily 90 tablet 1    finasteride (PROSCAR) 5 MG tablet Take 1 tablet by mouth daily 90 tablet 3    B-D INS SYR ULTRAFINE 1CC/30G 30G X 1/2\" 1 ML MISC 1 each by Does not apply route 3 times daily 100 each 3    omeprazole (PRILOSEC) 20 MG delayed release capsule Take 1 capsule by mouth daily 90 capsule 1    tamsulosin (FLOMAX) 0.4 MG capsule take 1 capsule by mouth once daily 90 capsule 1    Lancets MISC Check sugars as directed.  Erica Pederson for pharmacy to substitute 100 each 5    Insulin Syringes, Disposable, U-100 1 ML MISC 1 each by Does not apply route daily 100 each 3    Blood Glucose Monitoring Suppl (ONE TOUCH ULTRA 2) w/Device KIT 1 kit by Does not apply route daily 1 kit 0    blood glucose test strips (ASCENSIA AUTODISC VI;ONE TOUCH ULTRA TEST VI) strip 1 each by Does not apply route 3 times daily 100 strip 5    docusate sodium (DOCQLACE) 100 MG capsule Take 1 capsule by mouth 2 times daily 60 capsule 5    mirtazapine (REMERON) 30 MG tablet take 1 tablet by mouth every evening 60 tablet 3    promethazine (PHENERGAN) 25 MG tablet Take 1 tablet by mouth every 6 hours as needed for Nausea 30 tablet 3    aspirin 81 MG EC tablet Take 1 tablet by mouth daily 90 tablet 1    Handicap Placard MISC by Does not apply route 1 each 0    glucose monitoring kit (FREESTYLE) monitoring kit 1 kit by Does not apply route daily 1 kit 0    beclomethasone (QVAR) 80 MCG/ACT inhaler Inhale 2 puffs into the lungs 2 times daily      OXYGEN Inhale 6 L into the lungs continuous       Ipratropium-Albuterol (COMBIVENT IN) Inhale 2 puffs into the lungs 2 times daily           Medications patient taking as of now reconciled against medications ordered at time of hospital discharge: Yes    Chief Complaint   Patient presents with    Care Management     transitional care management visit       History of Present illness - Follow up of Hospital diagnosis(es): Patient presented to ED after walking in the store without his oxygen, at which time he collapsed unconscious and awoke to people around him calling an ambulance. He was SOB prior to collapse. He was admitted for acute CHF exacerbation vs COPD exacerbation. During the collapse, he suffered a laceration to the plantar aspect of his 4th R toe that required sutures by podiatry. Inpatient course: Discharge summary reviewed- see chart. Interval history/Current status: Patient presented today for inpatient follow up of acute CHF exacerbation. He states his breathing has improved. He does not need to use rescue inhaler extra. He is currently on 8 L O2, but previously was on 6 L continuous. He is not having current difficulty with leg swelling. He initially turned down having a rollator, but change his mind. His 4th R toe laceration is healing well, and he denies fever, chills, redness, and swelling. A friend came over and changed the wrapping for him yesterday. He was prescribed antibiotics for it, and states he picked them up yesterday. He has follow up scheduled with cardology and with podiatry. Patient's trazodone was held in hospital for prolonged QTc. Patient is on many medications to help him sleep, and we have slowly been trying to wean him down on trazodone as he also takes Remeron, Seroquel, and gabapentin to sleep. No longer taking OTC medications in addition. Patient needs repeat CMP for Cr elevation and for HTN hx. Metformin was held for elevated Cr. Review of Systems :  Review of Systems   Constitutional: Negative for chills and fever. Respiratory: Positive for shortness of breath (improved). Cardiovascular: Negative for leg swelling. Genitourinary: Negative for decreased urine volume. Skin: Positive for wound (4th R toe). Negative for color change. Denies swelling   Psychiatric/Behavioral: Positive for dysphoric mood and sleep disturbance.        Vitals:    08/18/21 1524   BP: 126/84   Site: Right Upper Arm   Position: Sitting   Cuff Size: Medium Adult   Pulse: 100   Resp: 26   Temp: 97.6 °F (36.4 °C)   TempSrc: Temporal   SpO2: 92% Weight: (!) 375 lb (170.1 kg)   Height: 6' (1.829 m)     Body mass index is 50.86 kg/m². Wt Readings from Last 3 Encounters:   08/23/21 (!) 374 lb 6.4 oz (169.8 kg)   08/18/21 (!) 375 lb (170.1 kg)   08/08/21 (!) 379 lb 1 oz (171.9 kg)     BP Readings from Last 3 Encounters:   08/23/21 132/80   08/18/21 126/84   08/10/21 120/76        Physical Exam :    Vitals: /84 (Site: Right Upper Arm, Position: Sitting, Cuff Size: Medium Adult)   Pulse 100   Temp 97.6 °F (36.4 °C) (Temporal)   Resp 26   Ht 6' (1.829 m)   Wt (!) 375 lb (170.1 kg)   SpO2 92%   BMI 50.86 kg/m²   General Appearance: Well developed, awake, alert, oriented, and in NAD  HEENT: NCAT, MMM, no pallor or icterus. Neck: Symmetrical, trachea midline. Chest wall/Lung: CTAB, respirations unlabored. No ronchi/wheezing/rales   Heart: RRR, normal S1 and S2, no murmurs, rubs or gallops. Abdomen: SNTND  Extremities: Extremities normal, atraumatic, no cyanosis, clubbing or edema. Skin: Skin color, texture, turgor normal, no rashes; R fourth toe without swelling or erythema; stitches in place  Musculokeletal: ROM grossly normal in all joints of extremities, no obvious joint swelling. Neurologic: Alert&Oriented x3. No focal motor deficits detected. Psychiatric: Normal mood. Normal affect. Normal behavior. Assessment & Plan :    Acute on chronic congestive heart failure, unspecified heart failure type (HCC)  - MO DISCHARGE MEDS RECONCILED W/ CURRENT OUTPATIENT MED LIST    Essential hypertension; Cr elevation  - COMPREHENSIVE METABOLIC PANEL; Future    Insomnia, unspecified type/Depression, unspecified depression type  · Explained to patient we will not be able to prescribe more than 200 mg HS of trazodone as he is on high dose of other medications to sleep  - traZODone (DESYREL) 100 MG tablet; Take 2 tablets by mouth nightly  Dispense: 60 tablet;  Refill: 0  - [facility] MD Nacho, Sleep Medicine, [address] [place] [address] Making: moderate complexity    Additional plan and future considerations:       Return to Office: Return in 1 month (on 9/18/2021).     Any Delgadillo DO   Case discussed with Dr. Juventino Rodriguez

## 2021-08-18 NOTE — PROGRESS NOTES
Post-Discharge Transitional Care Management Services  Nurse/MA Progress Note     Lenard Arora, 1960  Date of Visit:  8/18/2021     Please evaluate the following checklist (all answers must be yes)     The care coordinator documented communication with the patient/caretaker within 2 business days of the discharge date and filed an encounter? Yes If NO - convert to an office visit; patient does not qualify for GUERO visit     If YES - go to next question   The discharge information is available for the physician to review? Yes If NO - convert to an office visit; patient does not qualify for GUERO visit     If YES - go to next question   Is this visit within 7 or 14 days of discharge? Yes - Less than 14 - Informed provider this qualifies only for 47309 moderate complexity. If NO - convert to an office visit; patient does not qualify for GUERO visit     If YES - go to next question   Is the visit the first TCM in the 30d prior to this admission. Yes - this is the first TCM within the time period including 30d prior to this currently discussed admission. If NO - convert to an office visit; patient does not qualify for GUERO visit      If YES - go on to room the patient as a TCM visit. The Doctor should use the system smart phrase TCMPN as their note template. This visit requires attending presence if completed by a resident.     Billing codes should be as above    - 47475 - moderate complexity (visit occurring between 1-14d after discharge)   - 69726 - high complexity (high complexity visit occurring between 1-7d after discharge)

## 2021-08-23 NOTE — PROGRESS NOTES
[facility]  Office Visit         Reason for Visit: Heart Failure    Primary Cardiologist: Dr. Erin Oliver        History of Present Illness:     Mr. Boo Asif is a 61year old male with a PMHx of ischemic cardiomyopathy, CAD, HTN, HLD, COPD on home O2, ALEXIS on CPAP, T2DM, CKD, morbid obesity. He recently presented to the emergency room with complaints of shortness of breath, cough and syncopal episode. He was admitted for acute heart failure and hypoxic respiratory failure. He was IV diuresed and during hospitalization underwent TTE that demonstrated LVEF 45-50%, mid to distal anterolateral and apical wall hypokinesis. Mild RV dysfunction with no significant valvular disease noted on study. He underwent NM nuclear stress however due to poor imaging unable to obtain accurate reading. Therefore he was medically treated with GDMT. He subjectively improved and was discharged to home. He presents today in post acute heart failure hospitalization follow-up since discharge from the hospital he has been compliant with all of his current cardiac medications. Due to rising renal function with hyperkalemia his lisinopril was discontinued per PCP. He has excellent urinary response to oral furosemide with clear yellow urine production. He has chronic dyspnea with exertion, shortness of breath, or decline in overall functional capacity. He denies orthopnea, PND, nocturnal cough or hemoptysis. He denies abdominal distention or bloating, early satiety, anorexia/change in appetite, unintentional weight loss. He does not lower extremity edema. He denies exertional lightheadedness. He denies palpitations, syncope or near syncope. Review of systems is negative for chest pain, pressure, discomfort. When ambulating on an incline, He does not leg claudication. History is negative for neurological symptoms including transient loss of vision, asymmetric weakness, aphasia, dysphasia, numbness, tingling.        Patient  CAD (coronary artery disease) 2008    2 stents in 2008 .  CH[facility]congestive [facility] failure) ([facility])     Chills     Chronic sinusitis     Cocaine abuse ([facility] [address]     COPD (chronic obstructive pulmonary disease) ([facility])     Depression     Diabetes mellitus ([facility])     on insulin    GERD (gastroesophageal reflux disease)     H/O cardiovascular stress test     Hand fracture 3/2010    Fractured both hands.  Hepatitis C     Work up was negative. Saw specialist. Negative viral load.  History of tobacco abuse     Hyperlipidemia     Hypertension     Night sweats     NSTEMI (non-ST elevation myocardial infarction) ([facility] [address] 3/2010    Hospitalized.  Obesity     Oxygen dependent     Panic attacks     Pneumonia 3/2010     LLL.  Sleep apnea     Has CPAP machine    SOB (shortness of breath)     does not know settings       Past Surgical History:   Procedure Laterality Date    COLONOSCOPY  2011    COLONOSCOPY  9/23/15   [facility] DIAGNOSTIC CARDIAC CATH LAB PROCEDURE  2/25/2009    SE, cardiac cath/primary BM stent in proximal LAD.     OTHER SURGICAL HISTORY      anal fistula    TONSILLECTOMY         Allergies   Allergen Reactions    Amoxicillin Hives and Shortness Of Breath    Penicillins Hives and Shortness Of Breath     TRIAMOX         Outpatient Medications Marked as Taking for the 8/23/21 encounter (Office Visit) with JENELLE Jackson CNP   Medication Sig Dispense Refill    BiPAP Machine MISC by Does not apply route nightly      hydrALAZINE (APRESOLINE) 10 MG tablet Take 1 tablet by mouth 3 times daily 90 tablet 3    traZODone (DESYREL) 100 MG tablet Take 2 tablets by mouth nightly 60 tablet 0    gabapentin (NEURONTIN) 800 MG tablet take 1 tablet by mouth NIGHTLY 30 tablet 1    furosemide (LASIX) 40 MG tablet Take 1 tablet by mouth daily 60 tablet 3    metoprolol succinate (TOPROL XL) 50 MG extended release tablet Take 1 tablet by mouth daily 30 tablet 3    albuterol (PROVENTIL) (2.5 MG/3ML) 0.083% nebulizer solution Take 3 mLs by nebulization 4 times daily inhale contents of 1 vial in nebulizer four times a day 1080 mL 1    insulin aspart (NOVOLOG PENFILL) 100 UNIT/ML injection cartridge Inject 20 Units into the skin daily 5 Cartridge 3    insulin 70-30 (NOVOLIN 70/30) (70-30) 100 UNIT per ML injection vial Inject 55 Units into the skin 2 times daily 8 vial 3    atorvastatin (LIPITOR) 80 MG tablet take 1 tablet by mouth once daily 90 tablet 1    finasteride (PROSCAR) 5 MG tablet Take 1 tablet by mouth daily 90 tablet 3    omeprazole (PRILOSEC) 20 MG delayed release capsule Take 1 capsule by mouth daily 90 capsule 1    tamsulosin (FLOMAX) 0.4 MG capsule take 1 capsule by mouth once daily 90 capsule 1    docusate sodium (DOCQLACE) 100 MG capsule Take 1 capsule by mouth 2 times daily 60 capsule 5    mirtazapine (REMERON) 30 MG tablet take 1 tablet by mouth every evening 60 tablet 3    promethazine (PHENERGAN) 25 MG tablet Take 1 tablet by mouth every 6 hours as needed for Nausea 30 tablet 3    aspirin 81 MG EC tablet Take 1 tablet by mouth daily 90 tablet 1    beclomethasone (QVAR) 80 MCG/ACT inhaler Inhale 2 puffs into the lungs 2 times daily      OXYGEN Inhale 6 L into the lungs continuous       Ipratropium-Albuterol (COMBIVENT IN) Inhale 2 puffs into the lungs 2 times daily            Review of Systems:   Cardiac: As per HPI  General: No fever, chills, rigors  Pulmonary: As per HPI  HEENT: No visual disturbances, difficult swallowing  GI: No nausea, vomiting, abdominal pain  : No dysuria or hematuria  Endocrine: No thyroid disease or diabetes  Musculoskeletal: MARINO x 4, no focal motor deficits  Skin: Intact, no rashes  Neuro/Psych: No headache or seizures          Weights:   Wt Readings from Last 3 Encounters:   08/23/21 (!) 374 lb 6.4 oz (169.8 kg)   08/18/21 (!) 375 lb (170.1 kg)   08/08/21 (!) 379 lb 1 oz (171.9 kg)             Physical Examination:     /80   Pulse 85   Resp 26 Ht 6' (1.829 m)   Wt (!) 374 lb 6.4 oz (169.8 kg)   SpO2 93%   BMI 50.78 kg/m²     CONSTITUTIONAL: Alert and oriented times 3, no acute distress and cooperative to examination with proper mood and affect. SKIN: Skin color, texture, turgor normal. No rashes or lesions. LYMPH: no cervical nodes, no inguinal nodes  HEENT: Head is normocephalic, atraumatic. EOMI, PERRLA. NECK: Supple, symmetrical, trachea midline, no adenopathy, thyroid symmetric, not enlarged and no tenderness, skin normal.  CHEST/LUNGS: chest symmetric with normal A/P diameter, normal respiratory rate and rhythm, lungs clear to auscultation without wheezes, rales or rhonchi. No accessory muscle use. Scars None   CARDIOVASCULAR: Heart sounds are normal.  Regular rate and rhythm without murmur, gallop or rub. Normal S1 and S2. Carotid and femoral pulses 2+/4 and equal bilaterally. ABDOMEN: Morbid obesity. No and Laparoscopic scar(s) present. Normal bowel sounds. No bruits. soft, nondistended, no masses or organomegaly. no evidence of hernia. Percussion: Normal without hepatosplenomegally. Tenderness: absent. RECTAL: deferred, not clinically indicated  NEUROLOGIC: There are no focalizing motor or sensory deficits. CN II-XII are grossly intact. Papa Confer EXTREMITIES: no cyanosis, no clubbing and no edema. All the following diagnostics were personally reviewed and interpreted by me.        LAB DATA:     8/10/2021 07:04 8/18/2021 16:15   Sodium 142 143   Potassium 3.9 5.2 (H)   Chloride 103 106   CO2 29 23   BUN 31 (H) 23   Creatinine 1.8 (H) 2.1 (H)   Anion Gap 10 14   GFR Non- 39 32   GFR  47 39   Glucose 139 (H) 169 (H)   Calcium 9.2 9.7   Phosphorus 4.4    Total Protein  6.7   Meter Glucose     Albumin  3.6   Alk Phos  49   ALT  15   AST  27   Bilirubin  0.3   WBC 10.5    RBC 5.27    Hemoglobin Quant 14.0    Hematocrit 46.9    MCV 89.0    MCH 26.6    MCHC 29.9 (L)    MPV 11.5    RDW 14.8    Platelet Count 302 IMAGING:    CTA Pulmonary (2021)  Impression:  No central pulmonary embolism or aortic dissection.  The distal subsegmental  and peripheral vessels are poorly evaluated due to poor contrast.  Cardiomegaly with moderate pleural effusion with atelectasis likely CHF and or pneumonia. 2-4 mm nonspecific pulmonary nodules.  Consider surveillance according to Dr. Z guidelines. CXR (2021)  Impression:  Development of a discrete vague ill-defined patchy ground-glass density or infiltrates in the mid lower aspect of both lungs with some signs of a perihilar vascular congestion. Cannot exclude underline bilateral pneumonia. CARDIAC TESTIN/2016 Lexiscan MPS:   EF 42%. Findings consistent with infarction involving the mid and distal segments of the inferior wall. Inferior wall hypokinesis.    2021 TTE Dr Pacheco Conception:   Left ventricle is normal in size. EF visually estimated at 45-50%. Overall ejection fraction mildly decreased. Mid to distal anterolateral and apical walls are hypokinetic. Normal left ventricle wall thickness. Indeterminate diastolic function. Moderately dilated RV. Right ventricle global systolic function is mildly reduced . TAPSE 15 mm. No hemodynamically significant aortic stenosis is present. Mildly dilated aortic root (3.7 cm). No evidence for hemodynamically significant pericardial effusion. Technically difficult examination. Definity echo contrast was used to delineate endocardial borders.     2021 Lexiscan MPS:   Per Dr Villa-->Asked to review resting nuclear images --> un interpretable -->  stress test cancelled     EKG  Sinus Rhythm   RBBB, LAFB      ASSESSMENT:  1. Chronic HFmrEF (EF 45-50%)  2. ACC stage C / NYHA class III  3. Near euvolemic with good urinary response to oral lasix  4. CAD - PCI to proximal LAD (2009)  5. COPD / Chronic hypoxic respiratory failure with chronic supplemental O2  6. HTN  7. CKD  8. T2DM  9.  ALEXIS - compliant with BiPap  10. Morbid obesity   11. Pulmonary nodules   12. Former heavy tobacco abuse - Quit 2008  13. Hx of polysubstance abuse       PLAN:  1. Start Hydralazine 10 mg three times a day    2. Continue rest of current cardiac medications     3. Discussed referral to CHF clinic however would like to defer at this time    4. Follow up with Dr. Juan Powell in 1 month    5. Weigh yourself daily    -Stay Hydrated    -Diet should sodium restricted to 2 grams    -Again watch your daily weight trends and if you gain water weight please follow below instructions.    -If you gain 3-5 pounds in 2-3 days OR notice that you are retaining fluid in anyway just like you did before then take an extra dose of your water pill (furosemide/Lasix) every day until you lose the weight or feel better.     -If you notice that you have taken more than 2 extra doses in 1 week then please call and let us know. -If at any time you feel that you are retaining fluid, your medications are not working, or you feel ill in anyway, then please call us for either same day appointment or the next day, and for instructions. Our goal is to keep you out of the emergency room and the hospital and we have ways to do it. You just need to call us in a timely manner.     -If you become sick for other reasons, and notice that you are not urinating as much, the urine is very dark, you have significant diarrhea or vomiting, then please DO NOT take your water pill and CALL US immediately.       Bubba ALMANZAR-CNP  [facility]

## 2021-08-23 NOTE — PATIENT INSTRUCTIONS
1. Start Hydralazine 10 mg three times a day    2. Continue rest of current cardiac medications     3. Discussed referral to CHF clinic however would like to defer at this time    4. Follow up with Dr. Judd Gamez in 1 month    5. Weigh yourself daily    -Stay Hydrated    -Diet should sodium restricted to 2 grams    -Again watch your daily weight trends and if you gain water weight please follow below instructions.    -If you gain 3-5 pounds in 2-3 days OR notice that you are retaining fluid in anyway just like you did before then take an extra dose of your water pill (furosemide/Lasix) every day until you lose the weight or feel better.     -If you notice that you have taken more than 2 extra doses in 1 week then please call and let us know. -If at any time you feel that you are retaining fluid, your medications are not working, or you feel ill in anyway, then please call us for either same day appointment or the next day, and for instructions. Our goal is to keep you out of the emergency room and the hospital and we have ways to do it. You just need to call us in a timely manner.     -If you become sick for other reasons, and notice that you are not urinating as much, the urine is very dark, you have significant diarrhea or vomiting, then please DO NOT take your water pill and CALL US immediately.

## 2021-08-23 NOTE — TELEPHONE ENCOUNTER
Pt called and he's taking losartan 25 mg. Should he continue too?  He can be reached at [phone]

## 2021-08-24 NOTE — TELEPHONE ENCOUNTER
No tell him he should not be taking losartan - start the Hydralazine that was ordered yesterday.    Get blood work in one week ( BMP/BNP)  If he has ability to please provide us with BP/HR check in one week    Thank you

## 2021-08-24 NOTE — TELEPHONE ENCOUNTER
Called pt. To tell him to discontinue the Losartin and begin the hydralazine that was ordered yesterday per Jamal Santos CNP. Also asked him if he has a BP monitor at home. He does not. I encouraged him to obtain one and monitor/record BP/HR. He will pick one up soon. I told him to get blood work next week as well -- BMP & BNP. Pt. Verbalized understanding of instructions and stated he will comply with blood work and med changes.      Sarah Buchanan RN

## 2021-08-25 NOTE — TELEPHONE ENCOUNTER
Left VM for Morris Rust per ZI Koehler CNP   Stop Losartan use. Start the ordered Hydralazine 10mg TID. Repeat bmp/bnp in 1 week after starting hydralazine. Call office to confirm understanding.  Also call to update if you are able to check bp / hr in a week, if not will set you up a check at the office  (labs are in for bmp/bnp- ZI Koehler CNP )     Pending return call to confirm above    Lakshmi Chao RN

## 2021-08-30 PROBLEM — E11.9 DIABETES MELLITUS WITHOUT COMPLICATION (HCC): Status: ACTIVE | Noted: 2020-08-18

## 2021-08-31 NOTE — TELEPHONE ENCOUNTER
Last Appointment:  8/18/2021  Future Appointments   Date Time Provider Alex Anders   9/13/2021  2:00 PM JENELLE Arvizu CNP [facility]   9/21/2021  3:00 PM DO [facility]   9/23/2021  1:00 PM Katie Gallegos MD [facility]   9/28/2021  2:00 PM Cristiane Singh  [address] [facility]

## 2021-08-31 NOTE — PROGRESS NOTES
Physician Progress Note      PATIENT:               Guy Guthrie  CSN #:                  [id number]  :                       1960  ADMIT DATE:       2021 1:58 PM  [address] [place] DATE:        8/10/2021 7:31 PM  RESPONDING  PROVIDER #:        Brenda Ya          QUERY TEXT:    Patient admitted with SOB. Per procedure note dated 21 documentation of   laceration repair. To accurately reflect the procedure performed please   document the deepest depth of laceration which was repaired: The medical record reflects the following:  Risk Factors: Right foot laceration  Clinical Indicators: Right foot laceration  Treatment: Laceration repair (Depth unknown)    Thank you,  Juan Manuel Michelle RN, BSN  Options provided:  -- Laceration repair of skin  -- Laceration repair of subcutaneous tissue  -- Laceration repair of fascia  -- Other - I will add my own diagnosis  -- Disagree - Not applicable / Not valid  -- Disagree - Clinically unable to determine / Unknown  -- Refer to Clinical Documentation Reviewer    PROVIDER RESPONSE TEXT:    Addendum to 21 procedure note: Laceration repair of subcutaneous tissue of   4th right digit was performed during procedure on 21.     Query created by: Leticia Ritter on 2021 12:05 PM      Electronically signed by:  Brenda Ya 2021 1:29 PM

## 2024-07-11 NOTE — PROGRESS NOTES
Can do 8-8-24 at 12 noon for preop.   [facility]   Resident Progress Note    S:  Hospital day: 2    Brief Synopsis: Andrae Richards is a 61 y.o. male with a PMH of HTN, COPD, Sleep apnea, Type 2 diabetes, CAD status post stent placement, Depression, Hyperlipidemia, Hepatitis C and GERD.     The patient presents to ED for Loss of Consciousness. He reports worsening shortness of breath for the past 3 months. Patient is on 6L oxygen at home that he states he wears consistently. Patient lost consciousness on 7/31/21 after walking around a store without oxygen. No seizures, no bladder incontinence.     CT pulmonary with contrast showed cardiomegaly and moderate bilateral pleural effusions with atelectasis, likely CHF vs pneumonia. Also 2-4 mm nonspecific pulmonary nodules. Patient desaturated down to 80% in the ED on 4 L and was placed on 15 L nonrebreather mask. Patient currently on NC 10 L O2. He is wearing BiPAP during the night. Patient was compliant with his BiPAP last night. We are awaiting for Echo.     Patient states he is feeling okay today and slept good. He denies acute events during the night. He is being followed by Dr. Eboni Tucker, Psychologist.     SW consulted 08/03. Patient states he lives in a 2 floors house with 1 roommate. His room is in the 2nd floor.     Patient examined at bedside this morning. Patient was found wearing nasal cannula. Patient denies currently feeling short of breath and only notices difficulty breathing when he is walking around. Patient maintains conversation without having to stop and catch his breath. Patient states he was able to eat and did not notice and difficulty breathing while eating. Patient denies headache, dizziness, and chest pain. He has trace bilateral edema until the mid shin.     The patient has a right deep foot laceration. Normal foot X-ray. The wound was cleaned and sutured yesterday.      Cont meds:    sodium chloride      dextrose      dextrose       Scheduled meds:    furosemide  40 mg Intravenous Daily    amLODIPine  10 mg Oral Daily    aspirin  81 mg Oral Daily    atenolol  50 mg Oral Daily    atorvastatin  80 mg Oral Nightly    budesonide  0.5 mg Nebulization BID    mirtazapine  30 mg Oral Nightly    pantoprazole  40 mg Oral QAM AC    traZODone  400 mg Oral Nightly    sodium chloride flush  5-40 mL Intravenous 2 times per day    enoxaparin  40 mg Subcutaneous Daily    ipratropium-albuterol  1 ampule Inhalation Q4H WA    insulin glargine  30 Units Subcutaneous BID    insulin lispro  11 Units Subcutaneous TID      PRN meds: perflutren lipid microspheres, sodium chloride flush, sodium chloride, ondansetron **OR** ondansetron, polyethylene glycol, acetaminophen **OR** acetaminophen, hydrALAZINE, glucose, dextrose, glucagon (rDNA), dextrose, glucose, dextrose, glucagon (rDNA), dextrose     I reviewed the patient's Past Medical and Surgical History, Medications and Allergies. O:  VS: /71   Pulse 72   Temp 97.2 °F (36.2 °C) (Temporal)   Resp 18   Ht 6' (1.829 m)   Wt (!) 360 lb (163.3 kg)   SpO2 (!) 89%   BMI 48.82 kg/m²     Physical Exam:  Physical Exam  Vitals reviewed. Constitutional:       General: He is not in acute distress. Appearance: Normal appearance. He is obese. He is not ill-appearing or toxic-appearing. HENT:      Head: Normocephalic and atraumatic. Neck:      Vascular: No carotid bruit. Cardiovascular:      Rate and Rhythm: Normal rate and regular rhythm. Pulses: Normal pulses. Heart sounds: Normal heart sounds. No murmur heard. No friction rub. No gallop. Pulmonary:      Effort: Pulmonary effort is normal. No respiratory distress. Breath sounds: Normal breath sounds. No stridor. No wheezing, rhonchi or rales. Abdominal:      General: Bowel sounds are normal.      Palpations: Abdomen is soft. Tenderness: There is no abdominal tenderness. There is no guarding or rebound.    Musculoskeletal: Pending)       A/P:  Principal Problem:    Shortness of breath  Active Problems: Morbid obesity    Emphysema/COPD    Hypertension    Diabetes mellitus type 2 in obese (HCC)    CKD (chronic kidney disease) stage 3, GFR 30-59 ml/min ([facility])    Depression    CAD (coronary artery disease)    Pneumonia    Foot laceration    Hypoxia  Resolved Problems:    * No resolved hospital problems. *    1. Worsening shortness of breath 2/2 CHF vs COPD exacerbation  CTA pulmonary w contrast on 08/02: as listed above  CT lung screening on 08/02: as listed above  Patient has history of HFpEF, followed with Dr. Cinthia Mares. Last echo in 2016 (EF 65%)  Patient on 6 L NC at home, 4 L O2 in the car and uses BiPAP at night  Patient is currently on 10 L O2 NC  Unlikely pneumonia as patient is not complaining of any cough or sputum production, no fever, normal WBCs, and procal not significantly elevated  Pulmonary nodules on CT - Pulmonology does not consider that the nodules should be followed  Suspect CHF due to elevated ProBNP  · 20 mg IV Lasix 08/03. Patient remains edematous. Start Lasix IV 40 mg daily. · Strict Is and Os  · Pulmonology following - continue oxygen therapy and BiPAP, start Brovana BID, continue Pulmicort BID and Duonebs  · Wean O2 as able. Today on NC 10 L O2  · Awaiting Echocardiography  · Tomorrow CXR                2. ARLINE on CKD stage IIIb  Cr 2.1 mg/dL on admission, 1.8 mg/dL today  · Urine tests ordered, will calculate FENa and FEUrea  · FeNa 1.2% and FeUrea 48.1% suspecting an intrinsic renal disease  · Nephro consulted - Dr. Salome Hein  · Continue to monitor creatinine, BUN and GFR  · Hold HCTZ, Losartan, and Metformin                 3.  HTN  128/71  Atenolol may contribute to airway constriction. Patient would likely benefit from switching to metoprolol or carvedilol prior to discharge. · Continue atenolol and amlodipine   · Continue to hold HCTZ and Losartan due to ARLINE.   · Consider adding hydralazine if high BB                4. Foot laceration  Large laceration located under the R second toe, poorly approximated and continues to bleed  · Wound care consulted   · Wound was cleaned and sutured on 08/04                5. Type II diabetes   Home regimen: Novolog 20U daily, Novolin 70/30 55U BID  HbA1c (08/03): 6.3  · Continue Lantus 30 U BID and Humalog 11 U with meals (pharmacy replacement for patient's home regimen)  · Hold metformin due to ARLINE     6. HLD  · Continue Lipitor     7. GERD  · Continue Protonix      8. Depression  Patient's major motivating factor is his dog  · Continue Trazodone and Remeron  · Hold Gabapentin  · Psychologist, Dr. Shyam Pollard, following     9. Hx of Hep C infection  · Awaiting for hep C viral load     10. Hx of cocaine abuse  · Urine Drug test: positive for fentanyl  · Patient denies any recent drug abuse. Endorses a past history of IV cocaine abuse and abuse of various other drugs.  Denies ever using heroin.     DVT Prophylaxis: Lovenox  GI Prophylaxis: Protonix  Diet: Adult  PT/OT consulted  Wound care consult 08/04      Electronically signed by Mildred Ashby MD on 8/5/2021 at 12:53 PM  This case was discussed with attending physician Dr. Amanda Olivares